# Patient Record
Sex: FEMALE | Race: WHITE | NOT HISPANIC OR LATINO | Employment: FULL TIME | URBAN - METROPOLITAN AREA
[De-identification: names, ages, dates, MRNs, and addresses within clinical notes are randomized per-mention and may not be internally consistent; named-entity substitution may affect disease eponyms.]

---

## 2017-07-06 ENCOUNTER — RECORDS - HEALTHEAST (OUTPATIENT)
Dept: LAB | Facility: CLINIC | Age: 37
End: 2017-07-06

## 2017-07-07 LAB
HAV IGM SERPL QL IA: NEGATIVE
HBV CORE IGM SERPL QL IA: NEGATIVE
HBV SURFACE AG SERPL QL IA: NEGATIVE
HCV AB SERPL QL IA: NEGATIVE

## 2017-07-10 ENCOUNTER — RECORDS - HEALTHEAST (OUTPATIENT)
Dept: ADMINISTRATIVE | Facility: OTHER | Age: 37
End: 2017-07-10

## 2017-07-11 ENCOUNTER — HOSPITAL ENCOUNTER (OUTPATIENT)
Dept: NUCLEAR MEDICINE | Facility: HOSPITAL | Age: 37
Discharge: HOME | End: 2017-07-11
Attending: FAMILY MEDICINE

## 2017-07-11 DIAGNOSIS — R10.11 RUQ PAIN: ICD-10-CM

## 2017-08-07 ENCOUNTER — RECORDS - HEALTHEAST (OUTPATIENT)
Dept: LAB | Facility: CLINIC | Age: 37
End: 2017-08-07

## 2017-08-08 LAB — HIV 1+2 AB+HIV1 P24 AG SERPL QL IA: NEGATIVE

## 2017-08-09 LAB
HBV SURFACE AG SERPL QL IA: NEGATIVE
SYPHILIS RPR SCREEN - HISTORICAL: NORMAL

## 2018-08-06 ENCOUNTER — RECORDS - HEALTHEAST (OUTPATIENT)
Dept: LAB | Facility: CLINIC | Age: 38
End: 2018-08-06

## 2018-08-06 LAB — TSH SERPL DL<=0.005 MIU/L-ACNC: 1.37 UIU/ML (ref 0.3–5)

## 2018-11-23 ENCOUNTER — RECORDS - HEALTHEAST (OUTPATIENT)
Dept: LAB | Facility: CLINIC | Age: 38
End: 2018-11-23

## 2018-11-25 LAB — BACTERIA SPEC CULT: NORMAL

## 2018-11-26 ENCOUNTER — AMBULATORY - HEALTHEAST (OUTPATIENT)
Dept: MATERNAL FETAL MEDICINE | Facility: HOSPITAL | Age: 38
End: 2018-11-26

## 2018-11-26 DIAGNOSIS — Z34.90 PRENATAL CARE, ANTEPARTUM: ICD-10-CM

## 2018-12-06 ENCOUNTER — COMMUNICATION - HEALTHEAST (OUTPATIENT)
Dept: MATERNAL FETAL MEDICINE | Facility: HOSPITAL | Age: 38
End: 2018-12-06

## 2018-12-11 DIAGNOSIS — O09.899 HX OF PRETERM DELIVERY, CURRENTLY PREGNANT: Primary | ICD-10-CM

## 2018-12-28 ENCOUNTER — AMBULATORY - HEALTHEAST (OUTPATIENT)
Dept: MATERNAL FETAL MEDICINE | Facility: HOSPITAL | Age: 38
End: 2018-12-28

## 2019-01-18 ENCOUNTER — PRE VISIT (OUTPATIENT)
Dept: MATERNAL FETAL MEDICINE | Facility: CLINIC | Age: 39
End: 2019-01-18

## 2019-01-21 ENCOUNTER — PRE VISIT (OUTPATIENT)
Dept: MATERNAL FETAL MEDICINE | Facility: CLINIC | Age: 39
End: 2019-01-21

## 2019-01-21 NOTE — PROGRESS NOTES
Maternal-Fetal Medicine Consultation    Anisa Love  : 1980  MRN: [id number]    REFERRAL:  Anisa Love is a 38 year old sent by Dr. York for history of  delivery, here for preconception counseling.    HPI:  Anisa Love is a 38 year old  presenting with concerns regarding preconception counseling given history of cervical insufficiency and PPROM at 22w6d with di/di twins. Baby B, Esha, had demise at 20 days of life and Baby A, Caprice, had extensive NICU stay. She says her mood has been okay, she is on zoloft. Says she never dealt with her loss, since she has been busy with her baby and work. She would like to get pregnant soon, but is worried about having a repeat  delivery. She would like to know what she can do to help prevent this. Reports autopsy of Baby B showed some sort of clotting disorder, she wants to know if she needs genetic screening before next pregnancy     Past medical history notable for:  -  delivery at 24w0d di/di twin pregnancy- presented with cervical insufficiency (3 cm on admission) and PPROM (22w6d).  loss baby B at 20 weeks of life (2017)  - Depression- zoloft     Prenatal Care:  Primary OB care this pregnancy has been with Dr. York    Obstetrics History:  Obstetric History       T0      L1     SAB0   TAB0   Ectopic0   Multiple1   Live Births2       # Outcome Date GA Lbr Pascual/2nd Weight Sex Delivery Anes PTL Lv   1A   24w0d   F    HANNAH   1B   24w0d   F    ND        Gynecologic History:  - Last Pap: NILM (2015)- due for repeat pap  - Denies any history of abnormal pap smears  - Denies prior cervical surgery or procedures  - Denies any history of frequent UTIs, vaginal infections, or STIs    Past Medical History:  Depression    Past Surgical History:  Denies    Current Medications:  Zoloft    Drug and lactation database from the [employer]  [employer]:  http://toxnet.nlm.nih.gov/cgi-bin/sis/htmlgen?LACT    Allergies:  Patient has no allergy information on record.    Social History:   -   - Occasional tobacco use- 1 pack every 2 months- counseled patient on smoking cessation  - Denies alcohol or illicit drug use in pregnancy     Family History:  - Denies family history of bleeding or clotting disorders. Denies history of preeclampsia.     ROS:  10-point ROS negative except as in HPI     PHYSICAL EXAM:  Gen: NAD, well appearing  Chest: Non-labored breathing    ASSESSMENT:  38 year old y.o.  at here for history of  delivery, here for preconception counseling    -  delivery at 24w0d di/di twin pregnancy- presented with cervical insufficiency (3 cm on admission) and PPROM (22w6d)  - Depression    RECOMMENDATIONS:    # delivery- cervical insufficiency and PPROM   - Placental pathology from previous delivery consistent with triple I  - Difficult to predict if this will occur again, given that this was twin pregnancy since majority of data is based on zaidi pregnancies. Reassured patient there is nothing she could have done to prevent this   - Weekly 17-OHP injections starting at 16 weeks   - Serial cervical length assessments beginning at 16 weeks  - If cervical length ? 25 mm, then consider ultrasound indicated cerclage.   - Genetic counselor referral placed, patient desires carrier screening for her and her partner.     #Depression  - Risk of uncontrolled psychiatric disease outweighs the risk of medications during pregnancy, and therefore medications should be continued if medically indicated.  Given this patient should continue taking zoloft  - Given patient's overall mood and affect recommended patient establish care with psychiatrist       I acted as a scribe for Dr. Lauren Perkins MD  Obstetrics and Gyncology, PGY-2  2019 , 9:38 AM      MFM Attending Attestation    I have seen and evaluated the  patient myself. I spent a total of 15 minutes face-to-face with Anisa Love during today's office visit. Over 50% of this time was spent counseling the patient and/or coordinating care regarding history of  delivery and subsequent  loss.  The patient will pursue genetic counseling for carrier screening. See note for details; I have made the necessary edits/additions.    Alexandria Aguilar MD  Maternal Fetal Medicine

## 2019-01-22 ENCOUNTER — OFFICE VISIT (OUTPATIENT)
Dept: MATERNAL FETAL MEDICINE | Facility: CLINIC | Age: 39
End: 2019-01-22
Attending: OBSTETRICS & GYNECOLOGY
Payer: COMMERCIAL

## 2019-01-22 DIAGNOSIS — O09.899 HX OF PRETERM DELIVERY, CURRENTLY PREGNANT: Primary | ICD-10-CM

## 2019-01-22 DIAGNOSIS — O09.529 ANTEPARTUM MULTIGRAVIDA OF ADVANCED MATERNAL AGE: ICD-10-CM

## 2019-01-22 NOTE — PROGRESS NOTES
Pt presents to [facility] for preconception consult due to hx of 24 week di/di twins del with death of twin b at 20 days of life. Pt states she is overall doing of physically. Emotionally states she is working through. Pt met with Dr. Aguilar and Dr. Perkins. See [facility] for today's consult note discussion and recommendations. Questions answered. Pt would like to come back for a gc consult at a later date. Discharged stable. Augusta Spivey RN

## 2019-02-13 ENCOUNTER — TELEPHONE (OUTPATIENT)
Dept: MATERNAL FETAL MEDICINE | Facility: CLINIC | Age: 39
End: 2019-02-13

## 2019-02-13 ENCOUNTER — AMBULATORY - HEALTHEAST (OUTPATIENT)
Dept: MATERNAL FETAL MEDICINE | Facility: HOSPITAL | Age: 39
End: 2019-02-13

## 2019-02-13 DIAGNOSIS — Z31.69 ENCOUNTER FOR PRECONCEPTION CONSULTATION: ICD-10-CM

## 2019-02-13 NOTE — TELEPHONE ENCOUNTER
"I called Anisa regarding her question about her upcoming preconception genetic counseling consult. When she was scheduled for the consult she wanted to know if she and her  will be able to have \"a gamut of tests\" that day for the indication of suspected clotting/genetic issue in her child who passed away. She also wanted to know whether we have all the records we need for the appointment.     I called Anisa to clarify what concerns her daughter's autopsy indicated so we can determine what testing might be appropriate for her and her . I also wanted to let her know that it would be helpful for her to bring a copy of the autopsy report and any testing that was done on her daughter.     I left Anisa a brief message stating that I wanted to talk to her about her questions and to call me back at my direct number.     Elvia Barclay MS, [facility]  Maternal Fetal Medicine  [facility]  Ph: [phone]    "

## 2019-02-13 NOTE — TELEPHONE ENCOUNTER
SELMA received referral from [facility].  Patient is scheduled for genetic counseling appointment on 3/13/19 at [facility].    Marci Lujan

## 2019-02-15 ENCOUNTER — TELEPHONE (OUTPATIENT)
Dept: MATERNAL FETAL MEDICINE | Facility: CLINIC | Age: 39
End: 2019-02-15

## 2019-02-15 NOTE — TELEPHONE ENCOUNTER
Anisa called me back regarding the message I left her. I asked her if she had copies of any testing (particularly any genetic testing) that was done on her daughter. She stated that she has a copy of the autopsy report. I asked her to bring that report and any other test results she has with her to her appointment as we do not have direct access to her daughter's chart. I also explained that which tests are appropriate will depend on what type of bleeding/clotting disorder is indicated in the test results and autopsy report. Anisa agreed to bring the records she has to her appointment in March.     Elvia Barclay MS, [facility]  Maternal Fetal Medicine  [facility]  Ph: [phone]

## 2019-03-13 ENCOUNTER — OFFICE VISIT - HEALTHEAST (OUTPATIENT)
Dept: MATERNAL FETAL MEDICINE | Facility: HOSPITAL | Age: 39
End: 2019-03-13

## 2019-03-13 ENCOUNTER — TRANSFERRED RECORDS (OUTPATIENT)
Dept: HEALTH INFORMATION MANAGEMENT | Facility: CLINIC | Age: 39
End: 2019-03-13

## 2019-03-13 ENCOUNTER — AMBULATORY - HEALTHEAST (OUTPATIENT)
Dept: LAB | Facility: HOSPITAL | Age: 39
End: 2019-03-13

## 2019-03-13 DIAGNOSIS — Z31.430 ENCOUNTER OF FEMALE FOR TESTING FOR GENETIC DISEASE CARRIER STATUS FOR PROCREATIVE MANAGEMENT: ICD-10-CM

## 2019-03-13 DIAGNOSIS — O35.2XX0 HEREDITARY DISEASE IN FAMILY POSSIBLY AFFECTING FETUS, AFFECTING MANAGEMENT OF MOTHER IN PREGNANCY, SINGLE OR UNSPECIFIED FETUS: ICD-10-CM

## 2019-03-13 DIAGNOSIS — Z31.69 ENCOUNTER FOR PRECONCEPTION CONSULTATION: ICD-10-CM

## 2019-03-13 DIAGNOSIS — Z84.81 FAMILY HISTORY OF GENETIC DISEASE CARRIER: ICD-10-CM

## 2019-03-15 LAB
AT III ACT/NOR PPP CHRO: 105 % (ref 85–135)
PROT C ACT/NOR PPP CHRO: 119 % (ref 70–170)
PROT S FREE AG ACT/NOR PPP IA: 110 % (ref 55–125)

## 2019-03-18 ENCOUNTER — COMMUNICATION - HEALTHEAST (OUTPATIENT)
Dept: MATERNAL FETAL MEDICINE | Facility: HOSPITAL | Age: 39
End: 2019-03-18

## 2019-03-18 LAB — FACTOR 5 LEIDEN AND FACTOR 2 PROTHROMBIN MUTATION: NORMAL

## 2019-03-21 ENCOUNTER — RECORDS - HEALTHEAST (OUTPATIENT)
Dept: LAB | Facility: CLINIC | Age: 39
End: 2019-03-21

## 2019-03-22 LAB
ANION GAP SERPL CALCULATED.3IONS-SCNC: 8 MMOL/L (ref 5–18)
BUN SERPL-MCNC: 14 MG/DL (ref 8–22)
CALCIUM SERPL-MCNC: 10.7 MG/DL (ref 8.5–10.5)
CHLORIDE BLD-SCNC: 103 MMOL/L (ref 98–107)
CO2 SERPL-SCNC: 30 MMOL/L (ref 22–31)
CREAT SERPL-MCNC: 0.83 MG/DL (ref 0.6–1.1)
GFR SERPL CREATININE-BSD FRML MDRD: >60 ML/MIN/1.73M2
GLUCOSE BLD-MCNC: 94 MG/DL (ref 70–125)
POTASSIUM BLD-SCNC: 4.9 MMOL/L (ref 3.5–5)
SODIUM SERPL-SCNC: 141 MMOL/L (ref 136–145)
TSH SERPL DL<=0.005 MIU/L-ACNC: 2.22 UIU/ML (ref 0.3–5)

## 2019-03-28 ENCOUNTER — COMMUNICATION - HEALTHEAST (OUTPATIENT)
Dept: MATERNAL FETAL MEDICINE | Facility: HOSPITAL | Age: 39
End: 2019-03-28

## 2019-03-29 ENCOUNTER — COMMUNICATION - HEALTHEAST (OUTPATIENT)
Dept: MATERNAL FETAL MEDICINE | Facility: HOSPITAL | Age: 39
End: 2019-03-29

## 2019-04-12 LAB — COUNSYL FORESIGHT CARRIER SCREEN: NORMAL

## 2019-05-20 ENCOUNTER — RECORDS - HEALTHEAST (OUTPATIENT)
Dept: LAB | Facility: CLINIC | Age: 39
End: 2019-05-20

## 2019-05-22 LAB — BACTERIA SPEC CULT: NORMAL

## 2019-09-05 ENCOUNTER — COMMUNICATION - HEALTHEAST (OUTPATIENT)
Dept: OBGYN | Facility: CLINIC | Age: 39
End: 2019-09-05

## 2019-09-13 ENCOUNTER — MEDICAL CORRESPONDENCE (OUTPATIENT)
Dept: HEALTH INFORMATION MANAGEMENT | Facility: CLINIC | Age: 39
End: 2019-09-13

## 2019-09-13 ENCOUNTER — AMBULATORY - HEALTHEAST (OUTPATIENT)
Dept: MATERNAL FETAL MEDICINE | Facility: HOSPITAL | Age: 39
End: 2019-09-13

## 2019-09-13 DIAGNOSIS — O26.90 PREGNANCY, ANTEPARTUM, COMPLICATIONS: ICD-10-CM

## 2019-09-16 ENCOUNTER — AMBULATORY - HEALTHEAST (OUTPATIENT)
Dept: MATERNAL FETAL MEDICINE | Facility: HOSPITAL | Age: 39
End: 2019-09-16

## 2019-09-16 DIAGNOSIS — O10.919 HTN IN PREGNANCY, CHRONIC: ICD-10-CM

## 2019-09-16 DIAGNOSIS — O09.892 HX OF PRETERM DELIVERY, CURRENTLY PREGNANT, SECOND TRIMESTER: ICD-10-CM

## 2019-09-16 DIAGNOSIS — O09.529 ANTEPARTUM MULTIGRAVIDA OF ADVANCED MATERNAL AGE: Primary | ICD-10-CM

## 2019-09-17 ENCOUNTER — TRANSFERRED RECORDS (OUTPATIENT)
Dept: HEALTH INFORMATION MANAGEMENT | Facility: CLINIC | Age: 39
End: 2019-09-17

## 2019-09-19 ENCOUNTER — PRE VISIT (OUTPATIENT)
Dept: MATERNAL FETAL MEDICINE | Facility: CLINIC | Age: 39
End: 2019-09-19

## 2019-09-19 DIAGNOSIS — I10 HYPERTENSION, UNSPECIFIED TYPE: ICD-10-CM

## 2019-09-19 DIAGNOSIS — O09.899 HX OF PRETERM DELIVERY, CURRENTLY PREGNANT: ICD-10-CM

## 2019-09-19 NOTE — PROGRESS NOTES
Maternal-Fetal Medicine Consultation    Ansia Love  : 1980  MRN: [id number]    REFERRAL:  Anisa Love is a 39 year old sent by Dr. York from [facility] for MFM consultation.    HPI:  Anisa Love is a 39 year old  at 23w4d by 7w4d US here for MFM consultation regarding history of  delivery and anatomy scan with suboptimal cardiac views. She is here with her .    Overall reports that she is doing well this pregnancy. Has been getting weekly Cris injections and serial cervical length monitoring. Recently started on labetalol 100mg BID for elevated blood pressure. She is not checking her blood pressure at home. Reports intermittent headache, nothing severe. No vision changes. No chest pain, SOB, upper abdominal pain.     No LOF, VB. Feeling good movement. Has been feeling some pelvic tightening and pressure. Was evaluated for this a few weeks ago, no concerning findings.    Pregnancy complicated by:  -History of  delivery at 23 weeks with twins  -Chronic hypertension, on labetalol 100mg BID  -Depression on Zoloft  -Obesity    Prenatal Care:  Primary OB care this pregnancy has been with Dr. York  from [facility].    Dating:    LMP: 2019  Dating ultrasound: 2019 at 7w4d  Assigned YUNIOR: 2020 by 7w4d    Obstetrics History:  G1: di/di twins.  delivery at 24 weeks. Baby A living. Baby B  at 20 days.    Gynecologic History:  -Patient's last menstrual period was 2019.   - Last Pap: NILM, HPV: neg (2019)  - History of abnormal pap smears: ASCUS  - Denies prior cervical surgery or procedures  - Denies any history of frequent UTIs, vaginal infections  - History of chamydia    Past Medical History:  Depression  Chronic hypertension    Past Surgical History:  Not reviewed    Current Medications:  -PNV  -Zoloft   -ASA     Allergies:  Patient has no allergy information on record.    Social History:   History of tobacco use (3  cigarettes per week prior to pregnancy)    Family History:  Depression in father and maternal grandmother    ROS:  10-point ROS negative except as in HPI     PHYSICAL EXAM:  LMP 2019    Gen: NAD, well appearing  Chest: Non-labored breathing  Abdomen: gravid    Prenatal Labs:    Date: 2019  ABO/Rh: B+  ABS: neg   Hgb: 14.1  Plt: 250  GC/Chlam NR   Rubella IgG 3.08, Varicella immune  HBsAg NR, HIV NR, TPA NR    2019  AST: 12  Cr: 0.61  UPC: unable to calculate     Ultrasounds:   Dating ultrasound: 2019 at 7w4d  US today   EFW 631g 48.5%ile  Cervical length (TV) 4.03cm.   Adequate views of heart obtained today, normal    ASSESSMENT:  Anisa Love is a 39 year old  at 23w4d by 7w4d US here for M consultation regarding:     History of  delivery with twin pregnancy We reviewed the recommendations for prior  birth with patient.    Recommendations:  -Continue 17-hydroxyprogesterone caproate 250mg IM weekly until 36 weeks  gestation.  -Monitor for symptoms of  labor  -Administration of  corticosteroids if the patient is deemed to be at increased risk of imminent  delivery.     Chronic hypertension Women with chronic hypertension are at increased risk for early-onset preeclampsia.  Low dose aspirin has been used to lower this risk.  Chronic hypertension also increases the risk of maternal stroke, pulmonary edema, renal failure, gestational diabetes, iatrogenic  birth, fetal growth restriction, placental abruption and  mortality rate including stillbirth. Treatment with antihypertensives is generally used to maintain blood pressure in the general range of 120-160 mmHg systolic and  mmHg diastolic. Lower blood pressures may increase the risk of fetal growth restriction. The primary reason for antihypertensive is to reduce the risk of maternal stroke.     Recommendations:  -Continue labetalol and titrate as needed to achieve blood pressure  goal (<160/110)  -Low dose aspirin for preeclampsia prevention  -Ultrasounds for growth every 4 weeks  - fetal surveillance with weekly BPP starting at 32 weeks  -Delivery at 38-39 weeks (unless poorly controlled or otherwise indicated sooner)    The patient was seen and evaluated with Dr. Aguilar    Thank you for allowing us to participate in the care of your patient. Please do not hesitate to contact us if you have further questions regarding the management of your patient.     I acted as a scribe for Dr. Aguilar    Follow up as needed for concerns    Anitha Gonzalez MD  Ob/Gyn PGY-2  2019 3:43 PM    MFM Attending Attestation    I have seen and evaluated the patient myself. I spent a total of 10 minutes face-to-face with Anisa Love during today's office visit. Over 50% of this time was spent counseling the patient and/or coordinating care regarding maternal chronic hypertension and history of  birth. See note for details; I have made the necessary edits/additions.    Alexandria Aguilar MD  Maternal Fetal Medicine

## 2019-09-24 ENCOUNTER — OFFICE VISIT (OUTPATIENT)
Dept: MATERNAL FETAL MEDICINE | Facility: CLINIC | Age: 39
End: 2019-09-24
Attending: OBSTETRICS & GYNECOLOGY
Payer: COMMERCIAL

## 2019-09-24 ENCOUNTER — HOSPITAL ENCOUNTER (OUTPATIENT)
Dept: ULTRASOUND IMAGING | Facility: CLINIC | Age: 39
Discharge: HOME | End: 2019-09-24
Admitting: OBSTETRICS & GYNECOLOGY
Payer: COMMERCIAL

## 2019-09-24 DIAGNOSIS — O10.919 HTN IN PREGNANCY, CHRONIC: ICD-10-CM

## 2019-09-24 DIAGNOSIS — O09.892 HX OF PRETERM DELIVERY, CURRENTLY PREGNANT, SECOND TRIMESTER: ICD-10-CM

## 2019-09-24 DIAGNOSIS — O09.529 ANTEPARTUM MULTIGRAVIDA OF ADVANCED MATERNAL AGE: ICD-10-CM

## 2019-09-24 DIAGNOSIS — O26.90 PREGNANCY RELATED CONDITION, ANTEPARTUM: ICD-10-CM

## 2019-09-24 DIAGNOSIS — O09.892 HISTORY OF PRETERM DELIVERY, CURRENTLY PREGNANT IN SECOND TRIMESTER: Primary | ICD-10-CM

## 2019-09-24 PROCEDURE — 76811 OB US DETAILED SNGL FETUS: CPT

## 2019-09-24 PROCEDURE — 76817 TRANSVAGINAL US OBSTETRIC: CPT | Performed by: OBSTETRICS & GYNECOLOGY

## 2019-10-21 ENCOUNTER — HOSPITAL ENCOUNTER (OUTPATIENT)
Dept: OBGYN | Facility: HOSPITAL | Age: 39
Discharge: HOME | End: 2019-10-21
Attending: OBSTETRICS & GYNECOLOGY | Admitting: OBSTETRICS & GYNECOLOGY

## 2019-10-21 LAB
ABO/RH(D): NORMAL
ALBUMIN UR-MCNC: NEGATIVE MG/DL
ALT SERPL W P-5'-P-CCNC: 11 U/L (ref 0–45)
ANTIBODY SCREEN: NEGATIVE
APPEARANCE UR: CLEAR
APTT PPP: 25 SECONDS (ref 24–37)
AST SERPL W P-5'-P-CCNC: 14 U/L (ref 0–40)
BACTERIA #/AREA URNS HPF: ABNORMAL HPF
BILIRUB UR QL STRIP: NEGATIVE
COLOR UR AUTO: YELLOW
CREAT SERPL-MCNC: 0.6 MG/DL (ref 0.6–1.1)
CREAT UR-MCNC: 91.9 MG/DL
ERYTHROCYTE [DISTWIDTH] IN BLOOD BY AUTOMATED COUNT: 12.8 % (ref 11–14.5)
GFR SERPL CREATININE-BSD FRML MDRD: >60 ML/MIN/1.73M2
GLUCOSE UR STRIP-MCNC: NEGATIVE MG/DL
HCT VFR BLD AUTO: 35.4 % (ref 35–47)
HGB BLD-MCNC: 12 G/DL (ref 12–16)
HGB UR QL STRIP: ABNORMAL
INR PPP: 0.99 (ref 0.9–1.1)
KETONES UR STRIP-MCNC: NEGATIVE MG/DL
LEUKOCYTE ESTERASE UR QL STRIP: NEGATIVE
MCH RBC QN AUTO: 29.8 PG (ref 27–34)
MCHC RBC AUTO-ENTMCNC: 33.9 G/DL (ref 32–36)
MCV RBC AUTO: 88 FL (ref 80–100)
MUCOUS THREADS #/AREA URNS LPF: ABNORMAL LPF
NITRATE UR QL: NEGATIVE
PH UR STRIP: 6 [PH] (ref 4.5–8)
PLATELET # BLD AUTO: 200 THOU/UL (ref 140–440)
PMV BLD AUTO: 10.1 FL (ref 8.5–12.5)
PROTEIN, RANDOM URINE - HISTORICAL: 8 MG/DL
PROTEIN/CREAT RATIO, RANDOM UR: 0.09
RBC # BLD AUTO: 4.03 MILL/UL (ref 3.8–5.4)
RBC #/AREA URNS AUTO: ABNORMAL HPF
SP GR UR STRIP: 1.01 (ref 1–1.03)
SQUAMOUS #/AREA URNS AUTO: ABNORMAL LPF
URATE SERPL-MCNC: 3.6 MG/DL (ref 2–7.5)
UROBILINOGEN UR STRIP-ACNC: ABNORMAL
WBC #/AREA URNS AUTO: ABNORMAL HPF
WBC: 11.9 THOU/UL (ref 4–11)

## 2019-10-21 RX ORDER — OMEPRAZOLE 10 MG/1
20 CAPSULE, DELAYED RELEASE ORAL
Status: SHIPPED | COMMUNITY
Start: 2019-10-21 | End: 2021-12-28

## 2019-10-22 ENCOUNTER — RECORDS - HEALTHEAST (OUTPATIENT)
Dept: ADMINISTRATIVE | Facility: OTHER | Age: 39
End: 2019-10-22

## 2019-10-22 ENCOUNTER — AMBULATORY - HEALTHEAST (OUTPATIENT)
Dept: SURGERY | Facility: CLINIC | Age: 39
End: 2019-10-22

## 2019-10-22 DIAGNOSIS — K80.20 GALLSTONES: ICD-10-CM

## 2019-10-29 ENCOUNTER — RECORDS - HEALTHEAST (OUTPATIENT)
Dept: ADMINISTRATIVE | Facility: OTHER | Age: 39
End: 2019-10-29

## 2019-10-30 ENCOUNTER — RECORDS - HEALTHEAST (OUTPATIENT)
Dept: ADMINISTRATIVE | Facility: OTHER | Age: 39
End: 2019-10-30

## 2019-10-30 RX ORDER — SERTRALINE HYDROCHLORIDE 100 MG/1
200 TABLET, FILM COATED ORAL DAILY
Status: SHIPPED | COMMUNITY
Start: 2019-10-30 | End: 2022-08-01

## 2019-10-31 ENCOUNTER — RECORDS - HEALTHEAST (OUTPATIENT)
Dept: ADMINISTRATIVE | Facility: OTHER | Age: 39
End: 2019-10-31

## 2019-12-06 ENCOUNTER — HOSPITAL ENCOUNTER (OUTPATIENT)
Dept: OBGYN | Facility: HOSPITAL | Age: 39
Discharge: HOME | End: 2019-12-06
Attending: OBSTETRICS & GYNECOLOGY | Admitting: OBSTETRICS & GYNECOLOGY

## 2019-12-06 LAB
ABO/RH(D): NORMAL
ALBUMIN UR-MCNC: NEGATIVE MG/DL
ALT SERPL W P-5'-P-CCNC: <9 U/L (ref 0–45)
ANTIBODY SCREEN: NEGATIVE
APPEARANCE UR: CLEAR
APTT PPP: 25 SECONDS (ref 24–37)
AST SERPL W P-5'-P-CCNC: 12 U/L (ref 0–40)
BILIRUB UR QL STRIP: NEGATIVE
COLOR UR AUTO: YELLOW
CREAT SERPL-MCNC: 0.64 MG/DL (ref 0.6–1.1)
ERYTHROCYTE [DISTWIDTH] IN BLOOD BY AUTOMATED COUNT: 13.3 % (ref 11–14.5)
GFR SERPL CREATININE-BSD FRML MDRD: >60 ML/MIN/1.73M2
GLUCOSE UR STRIP-MCNC: NEGATIVE MG/DL
HCT VFR BLD AUTO: 33.3 % (ref 35–47)
HGB BLD-MCNC: 11 G/DL (ref 12–16)
HGB UR QL STRIP: NEGATIVE
INR PPP: 0.99 (ref 0.9–1.1)
KETONES UR STRIP-MCNC: NEGATIVE MG/DL
LEUKOCYTE ESTERASE UR QL STRIP: NEGATIVE
MCH RBC QN AUTO: 29.2 PG (ref 27–34)
MCHC RBC AUTO-ENTMCNC: 33 G/DL (ref 32–36)
MCV RBC AUTO: 88 FL (ref 80–100)
NITRATE UR QL: NEGATIVE
PH UR STRIP: 6 [PH] (ref 4.5–8)
PLATELET # BLD AUTO: 169 THOU/UL (ref 140–440)
PMV BLD AUTO: 11.1 FL (ref 8.5–12.5)
RBC # BLD AUTO: 3.77 MILL/UL (ref 3.8–5.4)
SP GR UR STRIP: 1.01 (ref 1–1.03)
URATE SERPL-MCNC: 4 MG/DL (ref 2–7.5)
UROBILINOGEN UR STRIP-ACNC: NORMAL
WBC: 10.9 THOU/UL (ref 4–11)

## 2019-12-07 LAB
CREAT UR-MCNC: 50.2 MG/DL
PROTEIN, RANDOM URINE - HISTORICAL: <7 MG/DL
PROTEIN/CREAT RATIO, RANDOM UR: NORMAL

## 2019-12-29 ASSESSMENT — MIFFLIN-ST. JEOR: SCORE: 1605.75

## 2019-12-30 ENCOUNTER — SURGERY - HEALTHEAST (OUTPATIENT)
Dept: OBGYN | Facility: HOSPITAL | Age: 39
End: 2019-12-30

## 2019-12-30 ENCOUNTER — ANESTHESIA - HEALTHEAST (OUTPATIENT)
Dept: OBGYN | Facility: HOSPITAL | Age: 39
End: 2019-12-30

## 2020-01-01 ENCOUNTER — HOME CARE/HOSPICE - HEALTHEAST (OUTPATIENT)
Dept: HOME HEALTH SERVICES | Facility: HOME HEALTH | Age: 40
End: 2020-01-01

## 2020-01-03 ENCOUNTER — HOME CARE/HOSPICE - HEALTHEAST (OUTPATIENT)
Dept: HOME HEALTH SERVICES | Facility: HOME HEALTH | Age: 40
End: 2020-01-03

## 2020-05-08 ENCOUNTER — RECORDS - HEALTHEAST (OUTPATIENT)
Dept: LAB | Facility: CLINIC | Age: 40
End: 2020-05-08

## 2020-05-08 LAB
ANION GAP SERPL CALCULATED.3IONS-SCNC: 11 MMOL/L (ref 5–18)
BUN SERPL-MCNC: 18 MG/DL (ref 8–22)
CALCIUM SERPL-MCNC: 9.6 MG/DL (ref 8.5–10.5)
CHLORIDE BLD-SCNC: 107 MMOL/L (ref 98–107)
CO2 SERPL-SCNC: 23 MMOL/L (ref 22–31)
CREAT SERPL-MCNC: 0.81 MG/DL (ref 0.6–1.1)
GFR SERPL CREATININE-BSD FRML MDRD: >60 ML/MIN/1.73M2
GLUCOSE BLD-MCNC: 100 MG/DL (ref 70–125)
POTASSIUM BLD-SCNC: 4.3 MMOL/L (ref 3.5–5)
SODIUM SERPL-SCNC: 141 MMOL/L (ref 136–145)
TSH SERPL DL<=0.005 MIU/L-ACNC: 1.65 UIU/ML (ref 0.3–5)

## 2020-12-29 ENCOUNTER — RECORDS - HEALTHEAST (OUTPATIENT)
Dept: LAB | Facility: CLINIC | Age: 40
End: 2020-12-29

## 2020-12-29 LAB
ALBUMIN SERPL-MCNC: 4.1 G/DL (ref 3.5–5)
ALP SERPL-CCNC: 48 U/L (ref 45–120)
ALT SERPL W P-5'-P-CCNC: 17 U/L (ref 0–45)
ANION GAP SERPL CALCULATED.3IONS-SCNC: 11 MMOL/L (ref 5–18)
AST SERPL W P-5'-P-CCNC: 17 U/L (ref 0–40)
BILIRUB SERPL-MCNC: 0.5 MG/DL (ref 0–1)
BUN SERPL-MCNC: 12 MG/DL (ref 8–22)
CALCIUM SERPL-MCNC: 8.9 MG/DL (ref 8.5–10.5)
CHLORIDE BLD-SCNC: 104 MMOL/L (ref 98–107)
CO2 SERPL-SCNC: 23 MMOL/L (ref 22–31)
CREAT SERPL-MCNC: 0.81 MG/DL (ref 0.6–1.1)
GFR SERPL CREATININE-BSD FRML MDRD: >60 ML/MIN/1.73M2
GLUCOSE BLD-MCNC: 92 MG/DL (ref 70–125)
POTASSIUM BLD-SCNC: 4.4 MMOL/L (ref 3.5–5)
PROT SERPL-MCNC: 7 G/DL (ref 6–8)
SODIUM SERPL-SCNC: 138 MMOL/L (ref 136–145)

## 2021-02-19 ENCOUNTER — RECORDS - HEALTHEAST (OUTPATIENT)
Dept: LAB | Facility: CLINIC | Age: 41
End: 2021-02-19

## 2021-02-19 LAB
ANION GAP SERPL CALCULATED.3IONS-SCNC: 6 MMOL/L (ref 5–18)
BUN SERPL-MCNC: 14 MG/DL (ref 8–22)
CALCIUM SERPL-MCNC: 8.9 MG/DL (ref 8.5–10.5)
CHLORIDE BLD-SCNC: 106 MMOL/L (ref 98–107)
CO2 SERPL-SCNC: 29 MMOL/L (ref 22–31)
CREAT SERPL-MCNC: 0.78 MG/DL (ref 0.6–1.1)
GFR SERPL CREATININE-BSD FRML MDRD: >60 ML/MIN/1.73M2
GLUCOSE BLD-MCNC: 104 MG/DL (ref 70–125)
POTASSIUM BLD-SCNC: 4.6 MMOL/L (ref 3.5–5)
SODIUM SERPL-SCNC: 141 MMOL/L (ref 136–145)

## 2021-03-01 ENCOUNTER — RECORDS - HEALTHEAST (OUTPATIENT)
Dept: LAB | Facility: CLINIC | Age: 41
End: 2021-03-01

## 2021-03-03 LAB — BACTERIA SPEC CULT: NORMAL

## 2021-05-07 ENCOUNTER — RECORDS - HEALTHEAST (OUTPATIENT)
Dept: LAB | Facility: CLINIC | Age: 41
End: 2021-05-07

## 2021-05-07 LAB — PROLACTIN SERPL-MCNC: 5.5 NG/ML (ref 0–20)

## 2021-05-27 VITALS
DIASTOLIC BLOOD PRESSURE: 88 MMHG | RESPIRATION RATE: 16 BRPM | HEART RATE: 79 BPM | TEMPERATURE: 97.8 F | SYSTOLIC BLOOD PRESSURE: 152 MMHG

## 2021-05-27 NOTE — TELEPHONE ENCOUNTER
3/28/2019    Anisa called and left a voicemail inquiring about insurance coverage for her carrier screening. Genetic counseling returned her call today and left the following information in her voicemail.    After discussion with the testing company and Anisa's insurance provider, it appears that the genetic testing will be a covered benefit.  However, Anisa's specific plan has a 20% coinsurance.   This means that for covered benefits, Anisa will still be responsible for 20% of the cost, unless she reaches an out of pocket maximum for the year.  [facility] is billing her test at $4079, which would result in an $815 out of pocked cost to Anisa based on the coinsurance.  [facility] offers an alternative payment option called their prompt pay program, in which a patient can make a one time payment of ~$350 dollars with no further financial obligation.  This option will be the cheapest option for Anisa's testing.  These cost estimates have been provided to Anisa by [facility] and she is able to elect which payment option she would like to pursue.  Anisa and her 's carrier testing results are still pending and expected in the next several days.  This information was left in Anisa's voicemail as requested and she was encouraged to contact me to discuss any questions.      Avni Ramos MS, [facility]  Licensed Genetic Counselor  Phone: [phone]  Pager: [phone]

## 2021-05-27 NOTE — TELEPHONE ENCOUNTER
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  Being a carrier is not expected to impact her health in any way.  Carriers do not experience any symptoms or medical concerns.  Brad's results are negative for these two conditions, meaning that their children are at low risk for being affected.      Brad was identified as a carrier for 2 conditions: Congenital Adrenal Hyperplasia and Alport syndrome.  Being a carrier is not expected to impact his health in any way.  Carrier do not experience any symptoms or medical concerns.  Anisa's results are negative for these two conditions, meaning that their children are at low risk for being affected.      For each of these conditions, each child they have will be at 50% risk for being affected.  We discussed that typically, carrier testing is not recommended for children, as being a carrier has no impact on medical management, but that this information may be important in the future if their daughter or any other children decide to have children of their own.    We discussed that this testing does not provide any explanation for the health complications their daughter Esha experienced before she passed away.  However, this testing indicates that their daughters, and any future children are at low risk for having any of the 176 conditions assessed.      Anisa's Factor V and Factor II results are also finished, and she is negative for these clotting factors.  Anisa's clotting workup was initiated due to unexplained multisystemic blood clots identified on their daughter Esha's autopsy.  Anisa's clotting workup is complete and unremarkable.      Anisa had no questions at this time and was encouraged to contact me with any questions or concerns moving forward.       Avni Ramos MS, [facility]  Licensed Genetic Counselor  Phone: [phone]  Pager: [phone]

## 2021-06-01 NOTE — PROGRESS NOTES
[facility] received referral from [facility]. Care team at [facility] reviewed referral and determined patient needs to be seen at [facility]. Transferred referral to [facility] and called patient to schedule. Waiting for patient to call back to schedule at [facility].  AMANDA.

## 2021-06-01 NOTE — TELEPHONE ENCOUNTER
"Pt called prison w/ hx of contractions and discharge at 21 weeks gestation, and that her provider had \"messaged\" her to be evaluated at a hospital for pre-term labor.  Pt states she went into pre-term labor at 22 weeks with her previous child and delivered at 23 weeks at [facility].  With her hx and s/s we determined it would be better for her to be seen at a higher acuity facility, the [facility] were offered, but pt requested to be seen at [facility].  TERRENCE Guallpa CNM on call for [facility] was called to inform her that pt is requesting to be seen at [facility], and [facility] was notified that pt would be coming to see them.  Pt was then called back with the info that [facility] could triage her, and that her provider does not see pts there, pt did at this time state she was at work and not planning to leave until after 3pm, I informed her to follow her doctors suggestions and that certainly if the discharge increases or her contractions increase that she should go sooner than later.  Pt agreed to plan as noted above.  "

## 2021-06-02 NOTE — PROGRESS NOTES
[place] [facility] [place]igastric and RUQ pain.  She has had this pain for the last two weeks intermittently, but it has been constant and more intense for the past 12 hours.  She has taken tums and zantac.  She denies HA, visual disturbances or increased swelling.  Her reflexes are +2/+2 with no clonus noted.  She reports nausea and vomiting which she relates to the pain.  She also reports she has had bouts of diarrhea. She denies LOF, dysuria or uterine activity.  EFM applied. Category one tracing noted with no uterine activity.  Dr. Gamboa notified at 0605 of above assessment and orders received for labs and to medicate with a GI cocktail.  Plan of care discussed with patient and .

## 2021-06-02 NOTE — PROGRESS NOTES
Upper abd pain continues. Pt to U/S, gallstone seen. EFM applied per pt's request, no contractions noted. Dr Ayala notified of the above. Pt discharged to home to follow up with [facility] and prenatal visit w/i the week.

## 2021-06-02 NOTE — PROGRESS NOTES
Dr. Gamboa updated with lab results and that patient is feeling a little better.  He reports Dr. Ayala will be coming in to evaluate patient with a probable discharge.

## 2021-06-03 VITALS — BODY MASS INDEX: 26.59 KG/M2 | HEIGHT: 65 IN

## 2021-06-03 VITALS
HEIGHT: 64 IN | BODY MASS INDEX: 32.96 KG/M2 | WEIGHT: 192 LBS | BODY MASS INDEX: 31.95 KG/M2 | BODY MASS INDEX: 32.96 KG/M2 | BODY MASS INDEX: 31.95 KG/M2 | HEIGHT: 64 IN | WEIGHT: 192 LBS

## 2021-06-04 VITALS — BODY MASS INDEX: 33.57 KG/M2 | WEIGHT: 201.5 LBS | HEIGHT: 65 IN

## 2021-06-04 NOTE — ANESTHESIA POSTPROCEDURE EVALUATION
Patient: Anisa Love   SECTION  Anesthesia type: epidural    Patient location: Labor and Delivery  Last vitals:   Vitals Value Taken Time   /63 2019  6:59 AM   Temp 36.6  C (97.9  F) 2019  6:59 AM   Pulse 74 2019  6:59 AM   Resp 16 2019  6:59 AM   SpO2 92 % 2019  6:59 AM     Post vital signs: stable  Level of consciousness: awake and responds to simple questions  Post-anesthesia pain: pain controlled  Post-anesthesia nausea and vomiting: no  Pulmonary: unassisted, return to baseline  Cardiovascular: stable and blood pressure at baseline  Hydration: adequate  Anesthetic events: no    QCDR Measures:  ASA# 11 - Allyson-op Cardiac Arrest: ASA11B - Patient did NOT experience unanticipated cardiac arrest  ASA# 12 - Allyson-op Mortality Rate: ASA12B - Patient did NOT die  ASA# 13 - PACU Re-Intubation Rate: NA - No ETT / LMA used for case  ASA# 10 - Composite Anes Safety: ASA10A - No serious adverse event    Additional Notes:

## 2021-06-04 NOTE — PROGRESS NOTES
"Patient presents to [facility] after \"feeling off\" today.  She is a  and says she has felt nauseous today and noted swelling in her left lower extremity.  Denies headache and any changes in vision today.  Reflexes WNL with no clonus.  BPs and symptoms reviewed with Libra Ferrell CNM on unit.  Orders obtained.  Labs ordered.    "

## 2021-06-04 NOTE — ANESTHESIA PROCEDURE NOTES
Epidural Block    Patient location during procedure: OB    Reason for Block:labor epidural  Staffing:  Performing  Anesthesiologist: Angélica Lucero MD  Preanesthetic Checklist  Completed: patient identified, risks, benefits, and alternatives discussed, timeout performed, consent obtained, airway assessed, oxygen available, suction available, emergency drugs available and hand hygiene performed  Procedure  Patient position: sitting  Prep: ChloraPrep  Patient monitoring: continuous pulse oximetry, heart rate and blood pressure  Approach: midline  Location: L3-L4  Injection technique: ALLEN saline  Number of Attempts:1  Needle  Needle type: Tuohy   Needle gauge: 17 G     Catheter in Space: 5 (ALLEN 6, taped 11)  Assessment  Sensory level:  No complications

## 2021-06-04 NOTE — PROGRESS NOTES
Vitals and lab results reviewed with PARADISE Ferrell who discussed care plan with Dr Gamboa.  Discharge orders obtained and reviewed with patient and .      Discharge paperwork reviewed.  Patient to return to clinic for follow-up on Monday or Tuesday.  Informed to call or return to hospital with any changes or signs of pre-eclampsia.

## 2021-06-04 NOTE — ANESTHESIA PREPROCEDURE EVALUATION
Anesthesia Evaluation      No history of anesthetic complications     Airway    Pulmonary - negative ROS   (-) COPD                         Cardiovascular   (+) hypertension (pre-E, significant IV antihypertensives required, on IV Mag. HELLP panel negative ), ,      Neuro/Psych - negative ROS     Endo/Other    (+) obesity, pregnant     GI/Hepatic/Renal    (+) GERD,             Dental                           Anesthesia Plan  Planned anesthetic: epidural  Labor epidural --> C/S (Buffered 2% Lido w/ epi)  Duramorph 2mg post-delivery  Toradol 15mg at case closure if cleared by surgeon    ASA 3 - emergent     Anesthetic plan and risks discussed with: patient    Post-op plan: routine recovery

## 2021-06-04 NOTE — ANESTHESIA PREPROCEDURE EVALUATION
Anesthesia Evaluation      No history of anesthetic complications     Airway    Pulmonary - negative ROS   (-) COPD                         Cardiovascular   (+) hypertension (pre-E, significant IV antihypertensives required, on IV Mag. HELLP panel negative ), ,      Neuro/Psych - negative ROS     Endo/Other    (+) obesity, pregnant     GI/Hepatic/Renal    (+) GERD,             Dental                         Anesthesia Plan  Planned anesthetic: epidural  Patient requests labor epidural. Chart reviewed, including labs. Reviewed options and risks with the patient, including but not limited to: bleeding, infection, damage to tissues under the skin (nerves, muscles, blood vessels), hypotension, headache, and epidural failure. Questions answered, consent signed. Patient agrees to elective labor epidural.     ASA 3     Anesthetic plan and risks discussed with: patient and spouse    Post-op plan: epidural analgesia

## 2021-06-04 NOTE — ANESTHESIA CARE TRANSFER NOTE
Last vitals:   Vitals:    12/31/19 0051   BP: 90/50   Pulse: 67   Resp: 12   Temp: 36.4  C (97.5  F)   SpO2: 92%     Patient's level of consciousness is awake  Spontaneous respirations: yes  Maintains airway independently: yes  Dentition unchanged: yes  Oropharynx: oropharynx clear of all foreign objects    QCDR Measures:  ASA# 20 - Surgical Safety Checklist: WHO surgical safety checklist completed prior to induction    PQRS# 430 - Adult PONV Prevention: 4558F - Pt received => 2 anti-emetic agents (different classes) preop & intraop  ASA# 8 - Peds PONV Prevention: NA - Not pediatric patient, not GA or 2 or more risk factors NOT present  PQRS# 424 - Allyson-op Temp Management: 4559F - At least one body temp DOCUMENTED => 35.5C or 95.9F within required timeframe  PQRS# 426 - PACU Transfer Protocol: - Transfer of care checklist used  ASA# 14 - Acute Post-op Pain: ASA14B - Patient did NOT experience pain >= 7 out of 10

## 2021-06-22 NOTE — PROGRESS NOTES
MFM [place] [facility]  Patient is scheduled for preconception consultation @ [facility] on 1/22/19.    Marci Lujan

## 2021-06-24 NOTE — PROGRESS NOTES
[facility] [place]  Genetic Counseling Consult    Patient: Anisa Love YOB: 1980   Date of Service: 2019      Anisa Love was seen at [facility] [place] for genetic consultation to review her daughter's autopsy report and discuss appropriate genetic evaluations based on that information.  Anisa was accompanied to her appointment today by her significant other Brad.  Impression/Plan:      1. Anisa and Brad brought a copy of their daughter Esha's medical records and autopsy report to today's appointment.  These records were briefly reviewed with [facility] physician .  Briefly, Esha was born at ~24 weeks gestational age and encountered multiple complications as a result.  Esha's  screen was also irregular for SCID, CF and CAH.  Her autopsy report notes significant blood clots in multiple organ systems that would not be associated with complications of prematurity.      2. Anisa and Brad had blood drawn for expanded carrier screening (Universal Panel + Fragile X through [employer]).  Results are expected within 10-14 days, and will be available in [employer].  We will contact her to discuss the results, and a copy will be forwarded to the office of the referring OB provider.  Test Barcode [id number]    3. After reviewing Anisa's daughter Esha's autopsy records,  recommended a clotting disorder workup for Anisa.  Orders were placed for Protein S antigen, Protein C activity, Antithrombin III activity, and Factor V and Factor II prothrombin mutation testing.  Anisa will be contacted to discuss these results as they are available.       Pregnancy History:   /Parity: No obstetric history on file.   Age at Delivery: 39 y.o.    Anisa gave birth 19 to twin girls at approximately 24 weeks gestational age due to spontaneous rupture of membranes for 1 twin.  Anisa's daughter Esha had multiple  complications from prematurity and passed away on DOL 20.  Anisa's daughter Caprice is alive and well, is being transitioned off of a feeding tube but is otherwise healthy with no known complications.      Medical History:   Anisa s reported medical history is not expected to impact pregnancy management or risks to fetal development.       Family History:     A full family history was not obtained today, and the primary focus of the conversation was their daughter Esha's health history.  Anisa denies any know family history of birth defects, cognitive impairment, or recurrent pregnancy loss.  Anisa reports that she has a maternal half sister with a reported blood clot, attributed to oral contraceptive use, but no other known family history of blood clots, strokes, aneurysm, etc.         Carrier Screening:   The patient reports that she and the father of the pregnancy have  ancestry:     Cystic fibrosis is an autosomal recessive genetic condition that occurs with increased frequency in individuals of  ancestry and carrier screening for this condition is available.  In addition,  screening in the [facility] includes cystic fibrosis.      Expanded carrier screening for mutations in a large panel of genes associated with autosomal recessive conditions including cystic fibrosis, spinal muscular atrophy, and others, is now available.      The patient elected to pursue carrier screening today.  Her blood was drawn for [place] Panel + Fragile X Syndrome and sent to [facility] [employer] [facility].  We will contact her when these test results become available, and a copy of these results will be relayed to her primary OB provider.       Risk Assessment for Chromosome Conditions:   We explained that the risk for fetal chromosome abnormalities increases with maternal age. We discussed specific features of common chromosome abnormalities, including Down syndrome, trisomy 13,  trisomy 18, and sex chromosome trisomies.  We briefly discussed that because these conditions occur as a spontaneous event at conception, there is not testing prior to pregnancy for these conditions, but that screening can be initiated as early as 10 weeks gestational age in a pregnancy if desired.        Discussion:     Anisa provided a summary of her daughter Esha's medical records and autopsy report today, which was reviewed with  as a part of Anisa's appointment today.  Esha was born at 24 weeks gestational age and respiratory, neurological, and gastrointestinal complications as a result.  Esha had an exploratory laparotomy on DoL 13 which identified necrotizing eterocolitis.  Her condition continued to deteriorate over the coming days and eventually passed away on DoL 20.  Esha's autopsy report notes the complications above as well as multiple large blood clots in multiple organ systems that are not explained by prematurity.  Based on this history,  recommended a clotting workup for Anisa as a means of gathering additional information for the family.  Esha's  screen was also reviewed which was positive for SCID and borderline for CF.  Esha's sister Caprice's NBS was positive for CAH.  Caprice was further evaluated by endocrinology and does not have CAH. Due to circumstance there was no follow up on Esha's NBS.  We discussed that it is unclear if the extreme prematurity of the twins could have impacted their NBS, but we discussed expanded carrier screening for both parents as a way of gathering additional information regarding risks for recessive genetic disease.  The couple opted to pursue this testing today and will be contacted once results are available.  The majority of today's appointment was spent clarifying Esha's medical history and discussed that in this situation, it can be difficult to determine which parts of her complications were due to prematurity and if there may have been any  other genetic conditions impacting her health.  We discussed that the testing ordered today would be a way of gathering as much information as possible for the family, both to help them process Esha's experience and to help clarify risks moving forward for the family.  We discussed that identified clotting disorders and risks for genetic conditions can sometimes impact how families approach pregnancy and can dictate medical management for future pregnancies as well.       It was a pleasure to be involved with Anisa dan care. Face-to-face time of the meeting was 50 minutes.    Avni Ramos MS, [facility]  Licensed Genetic Counselor  Phone: [phone]  Pager: [phone]

## 2021-06-25 NOTE — TELEPHONE ENCOUNTER
3/18/2019    Anisa called and reported that she received the billing estimates for her expanded carrier screening from [employer], and per her report, her insurance may cover testing after reviewing an explanation of medical necessity from the ordering clinic.  Anisa asked that I start the process of providing this information to her insurance clinic and gave me permission to contact her insurance provider on her behalf to facilitate this process.  Anisa will be contacted by genetic counseling with updates as the process progresses.     We also discussed results from appointment last week, final results for Protein S antigen, Protein C activity, and Antithrombin III activity are all within the normal range.  Anisa had no questions regarding these results at this time.  Still pending are Factor V and Factor II mutation testing and expanded carrier screening.     Avni Ramos MS, [facility]  Licensed Genetic Counselor  Phone: [phone]  Pager: [phone]

## 2021-06-25 NOTE — TELEPHONE ENCOUNTER
Anisa called and left a VM stating that her insurance requires additional documentation in order to cover her carrier screening and asked for our assistance.  I returned Anisa's call and left a VM asking for her to contact genetic counseling to discuss what documentation is needed.     Avni Ramos MS, [facility]  Licensed Genetic Counselor  Phone: [phone]  Pager: [phone]

## 2021-08-15 ENCOUNTER — HEALTH MAINTENANCE LETTER (OUTPATIENT)
Age: 41
End: 2021-08-15

## 2021-10-10 ENCOUNTER — HEALTH MAINTENANCE LETTER (OUTPATIENT)
Age: 41
End: 2021-10-10

## 2021-12-23 ENCOUNTER — VIRTUAL VISIT (OUTPATIENT)
Dept: PHARMACY | Facility: PHYSICIAN GROUP | Age: 41
End: 2021-12-23
Payer: COMMERCIAL

## 2021-12-23 DIAGNOSIS — L29.9 ITCHING: ICD-10-CM

## 2021-12-23 DIAGNOSIS — F41.1 GENERALIZED ANXIETY DISORDER: Primary | ICD-10-CM

## 2021-12-23 DIAGNOSIS — F32.9 MAJOR DEPRESSIVE DISORDER, REMISSION STATUS UNSPECIFIED, UNSPECIFIED WHETHER RECURRENT: ICD-10-CM

## 2021-12-23 DIAGNOSIS — I10 BENIGN ESSENTIAL HYPERTENSION: ICD-10-CM

## 2021-12-23 DIAGNOSIS — G47.00 INSOMNIA, UNSPECIFIED TYPE: ICD-10-CM

## 2021-12-23 PROCEDURE — 99605 MTMS BY PHARM NP 15 MIN: CPT | Performed by: PHARMACIST

## 2021-12-23 PROCEDURE — 99607 MTMS BY PHARM ADDL 15 MIN: CPT | Performed by: PHARMACIST

## 2021-12-23 NOTE — PROGRESS NOTES
Medication Therapy Management (MTM) Encounter    ASSESSMENT:                            Medication Adherence/Access: No issues identified    Depression, Anxiety, Insomnia: Given persistent symptoms despite maximum doses and combination antidepressant therapy, she would be a good candidate for pharmacogenetic testing to help guide medication adjustments. Additive serotonergic effects may occur with coadministration of duloxetine and sertraline, this could potentially contribute to increase jaw clenching she is experiencing. It may be reasonable to try stopping duloxetine before results are back to start taper process since patient is on lowest dose and no further taper would be needed for this medication.    Education Provided:  - Potential impact of pharmacokinetic and pharmacodynamic genetic variation on medication metabolism and action  - Reviewed genes tested  - Reviewed what report will look like  - How information may be helpful in guiding treatment  - How results may be useful when reviewing safety of other medications  - Limitations of test results on individual medication experience and other factors that impact medication selection    Patient Consent Form reviewed with patient, patient provided opportunity to ask questions, and confirmed understanding.    Hypertension: Patient's last clinic blood pressure was a little high but previous clinic readings were meeting blood pressure goal of < 130/80mmHg. Reasonable to continue current medications, focus on anxiety management and continue to monitor. She would benefit from complete smoking cessation as this may help blood pressure and can actually increase anxiety as well. Plan to discuss further at follow-up.     Skin rash: Improved with cetirizine.    PLAN:                            1. You should receive your test kit in the mail from [employer] in the next week. When it arrives follow the instructions to do the cheek swabs and send your sample back in the  packaging provided.   2. You can also find more information about the test at [email or web address]  3. Continue to take all of your current medications until we have your results and come up with a plan with your doctor. If you want to try stopping low dose duloxetine this would be okay since you are at the lowest dose and do not need to wean down.  4. Once your results are back I will call you to let you know, send the report by secure email, and setup a time for us to talk through the results.     Follow-up: By phone in 2-3 weeks when [employer] results are back      SUBJECTIVE/OBJECTIVE:                          Anisa Love is a 41 year old female called for an initial visit. She was referred to me from Tiffani Cassidy MD.      Reason for visit: Anxiety and depression medication management, discuss pharmacogenetic testing.    Allergies/ADRs: None  Past Medical History: Reviewed in chart  Tobacco: She reports that she has been smoking. She has never used smokeless tobacco. She reports smoking about 1/2 pack every 3 days or so.   Alcohol: 1-3 beverages / week  Caffeine: diet coke    Medication Adherence/Access: no issues reported    Depression, Anxiety, Insomnia:   Current medication:   sertraline 200 mg once daily  duloxetine 20 mg once daily  hydroxyzine 10 mg as needed- hasn't started  trazodone 100 mg at bedtime- hasn't started  She has struggled with depression and anxiety for a long time. Her antidepressant therapy was adjusted when she was trying to become pregnant. Prior to this her depression and anxiety were well managed with Cymbalta but she was transitioned to sertraline as a safer alternative during pregnancy. She did have a hard time coming off Cymbalta and is worried about being on a medication with significant withdrawal symptoms again. She has been on sertraline for some time now. She does think it was helpful initially but she has been on the highest dose and is still having a lot of symptoms.  Low duloxetine was added to see if that further helped her anxiety symptoms after she delivered. 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 She has had more tension in her jaw and also gets skin reaction she related to anxiety. At recent visit with Dr. Cassidy hydroxyzine and trazodone were ordered to help with her sleep and also her itching related to anxiety but she has not started these yet. They also discuss pharmacogenetic testing as an option before making any other medication changes and she is interested in pursuing this. She has had other antidepressant trials in the past, can't remember names of all of them. She would like to have more information to help with selecting medication and doses if available.     Hypertension:   Current medications:   Losartan 50 mg once daily  Amlodipine 10 mg (2x 5 mg tabs) once daily    She had issues with high blood pressure when she was pregnant and continues on blood pressure medication. She does not self-monitor blood pressure lately.  She reports no current medication side effects.        Skin rash:   Current medication: cetirizine 10 mg once daily  She takes this to help with rash and hives she has been getting from her increased anxiety. Itching and symptoms do improve when she takes cetirizine and return when she stops taking it.     Last Clinic Vitals: 12/15/2021- /84 (BP Location: Right arm, Patient Position: Sitting, Cuff Size: Adult Regular)   Pulse 68   ----------------    I spent 25 minutes with this patient today. All changes were made via collaborative practice agreement with Tiffani Cassidy MD. A copy of the visit note was provided to the patient's primary care provider.    The patient was sent via [employer] a summary of these recommendations.     Madeline Hodgson, PharmD, BCACP  Medication Therapy Management Pharmacist  [facility]  Pager: [phone]      Telemedicine Visit Details  Type of service:   Telephone visit  Start Time: 1:14 PM  End Time: 1:39 PM  Originating Location (patient location): Home  Distant Location (provider location):  [facility]     Medication Therapy Recommendations  Generalized anxiety disorder    Current Medication: sertraline (ZOLOFT) 100 MG tablet   Rationale: Condition refractory to medication - Ineffective medication - Effectiveness   Recommendation: Order Lab   Status: Accepted per CPA   Note: Pharmacogenetic testing

## 2021-12-28 VITALS — SYSTOLIC BLOOD PRESSURE: 134 MMHG | HEART RATE: 68 BPM | DIASTOLIC BLOOD PRESSURE: 84 MMHG

## 2021-12-28 RX ORDER — ACETAMINOPHEN AND CODEINE PHOSPHATE 120; 12 MG/5ML; MG/5ML
0.35 SOLUTION ORAL DAILY
COMMUNITY
End: 2022-08-01

## 2021-12-28 RX ORDER — HYDROXYZINE HYDROCHLORIDE 10 MG/1
10 TABLET, FILM COATED ORAL 3 TIMES DAILY PRN
COMMUNITY
End: 2022-08-01

## 2021-12-28 RX ORDER — LOSARTAN POTASSIUM 50 MG/1
50 TABLET ORAL DAILY
COMMUNITY

## 2021-12-28 RX ORDER — AMLODIPINE BESYLATE 5 MG/1
10 TABLET ORAL DAILY
COMMUNITY

## 2021-12-28 RX ORDER — TRAZODONE HYDROCHLORIDE 100 MG/1
50-100 TABLET ORAL AT BEDTIME
COMMUNITY
End: 2023-12-07

## 2021-12-28 RX ORDER — ACYCLOVIR 400 MG/1
400 TABLET ORAL 3 TIMES DAILY PRN
COMMUNITY

## 2021-12-28 RX ORDER — DULOXETIN HYDROCHLORIDE 20 MG/1
20 CAPSULE, DELAYED RELEASE ORAL DAILY
COMMUNITY
End: 2022-08-01

## 2021-12-28 RX ORDER — CETIRIZINE HYDROCHLORIDE 10 MG/1
10 TABLET ORAL DAILY
COMMUNITY
End: 2024-03-05

## 2021-12-28 NOTE — PATIENT INSTRUCTIONS
Recommendations from today's MTM visit:                                                    MTM (medication therapy management) is a service provided by a clinical pharmacist designed to help you get the most of out of your medicines.   Today we reviewed what your medicines are for, how to know if they are working, that your medicines are safe and how to make your medicine regimen as easy as possible.      1. You should receive your test kit in the mail from [employer] in the next week. When it arrives follow the instructions to do the cheek swabs and send your sample back in the packaging provided.   2. You can also find more information about the test at [email or web address]  3. Continue to take all of your current medications until we have your results and come up with a plan with your doctor. If you want to try stopping low dose duloxetine this would be okay since you are at the lowest dose and do not need to wean down.  4. Once your results are back I will call you to let you know, send the report by secure email, and setup a time for us to talk through the results.     Follow-up: By phone in 2-3 weeks when [employer] results are back    It was great to speak with you today.  I value your experience and would be very thankful for your time with providing feedback on our clinic survey. You may receive a survey via email or text message in the next few days.     To schedule another MTM appointment, please call the MTM scheduling line at [phone].    My Clinical Pharmacist's contact information:                                                      Please feel free to contact me with any questions or concerns you have.      Madeline Hodgson, [place], BCACP  Medication Therapy Management Pharmacist  [facility]  Pager: [phone]

## 2022-02-04 ENCOUNTER — OFFICE VISIT (OUTPATIENT)
Dept: PHARMACY | Facility: PHYSICIAN GROUP | Age: 42
End: 2022-02-04
Payer: COMMERCIAL

## 2022-02-04 DIAGNOSIS — Z13.79 ENCOUNTER FOR PHARMACOGENETIC TESTING: Primary | ICD-10-CM

## 2022-02-04 PROCEDURE — 99207 PR NO CHARGE LOS: CPT | Performed by: PHARMACIST

## 2022-02-04 NOTE — PROGRESS NOTES
Clinical Pharmacy Consult:                                                    This patient recently had pharmacogenetics testing completed through [employer]. Unfortunately, patient has been unable to reach to schedule a follow-up to review results, therefore a chart review has been performed to assess the patient's medications for safety and efficacy. This chart review is being routed to the patient's primary care provider in order for the PCP to meet with the patient to discuss the results and make any necessary medication adjustments.     Pharmacogenetic Results:  [facility] testing conducted and reported on 2022.  Select PGX Results: (see PDF report in chart documents for more detailed results):    Pharmacokinetic Gene Variants     Gene Phenotype Notable Medications Impacted   CY Rapid Metabolizer duloxetine   [id number] Intermediate Metabolizer sertraline, citalopram       Pharmacodynamic Gene Variants     Gene Phenotype Notable Medications Impacted   HLA-A*3101 Higher Risk carbamazepine, oxcarbazepine, lamotrigine   HLA-B*1502 Higher Risk carbamazepine, oxcarbazepine, lamotrigine   [id number] (*1A/*21) Decreased Function   May cause higher levels of certain statins.  Atorvastatin, pravastatin         Assessment:  [id number]  Intermediate Metabolizer    Medications converted to inactive metabolite(s) may have increased serum levels and increased risk for side effects. Lower or less frequent doses or alternative medication may be needed to reduce risk of side effects. (medications: sertraline, citalopram)    Medications converted to active metabolite(s) may have reduced exposure.     CY Rapid Metabolizer    Active drugs converted to inactive metabolite(s) may have decreased exposure and may result in lower serum levels than expected. Lower plasma concentrations will increase probability of pharmacotherapy failure. May require higher or more frequent doses to achieve therapeutic levels. (notable medications:  duloxetine)    Based on these results she may benefit from transitioning off sertraline and duloxetine and considering venlafaxine or fluoxetine.     Recommendations for consideration by PCP:    1. Consider stopping duloxetine and sertraline and starting venlafaxine or fluoxetine    Madeline Hodgson, PharmD, BCACP  Medication Therapy Management Pharmacist  [facility]  Pager: [phone]

## 2022-03-14 ENCOUNTER — LAB REQUISITION (OUTPATIENT)
Dept: LAB | Facility: CLINIC | Age: 42
End: 2022-03-14

## 2022-03-14 DIAGNOSIS — N92.6 IRREGULAR MENSTRUATION, UNSPECIFIED: ICD-10-CM

## 2022-03-14 DIAGNOSIS — N89.8 OTHER SPECIFIED NONINFLAMMATORY DISORDERS OF VAGINA: ICD-10-CM

## 2022-03-14 LAB — HCG SERPL QL: NEGATIVE

## 2022-03-14 PROCEDURE — 84703 CHORIONIC GONADOTROPIN ASSAY: CPT | Performed by: FAMILY MEDICINE

## 2022-03-14 PROCEDURE — 87205 SMEAR GRAM STAIN: CPT | Performed by: FAMILY MEDICINE

## 2022-03-15 LAB
NUGENT SCORE: 0
WHITE BLOOD CELLS: NORMAL

## 2022-04-20 ENCOUNTER — LAB REQUISITION (OUTPATIENT)
Dept: LAB | Facility: CLINIC | Age: 42
End: 2022-04-20

## 2022-04-20 DIAGNOSIS — Z01.419 ENCOUNTER FOR GYNECOLOGICAL EXAMINATION (GENERAL) (ROUTINE) WITHOUT ABNORMAL FINDINGS: ICD-10-CM

## 2022-04-20 DIAGNOSIS — I10 ESSENTIAL (PRIMARY) HYPERTENSION: ICD-10-CM

## 2022-04-20 LAB
ANION GAP SERPL CALCULATED.3IONS-SCNC: 11 MMOL/L (ref 5–18)
BUN SERPL-MCNC: 12 MG/DL (ref 8–22)
CALCIUM SERPL-MCNC: 9.7 MG/DL (ref 8.5–10.5)
CHLORIDE BLD-SCNC: 103 MMOL/L (ref 98–107)
CO2 SERPL-SCNC: 23 MMOL/L (ref 22–31)
CREAT SERPL-MCNC: 0.78 MG/DL (ref 0.6–1.1)
GFR SERPL CREATININE-BSD FRML MDRD: >90 ML/MIN/1.73M2
GLUCOSE BLD-MCNC: 84 MG/DL (ref 70–125)
POTASSIUM BLD-SCNC: 4.1 MMOL/L (ref 3.5–5)
SODIUM SERPL-SCNC: 137 MMOL/L (ref 136–145)

## 2022-04-20 PROCEDURE — 80048 BASIC METABOLIC PNL TOTAL CA: CPT | Performed by: FAMILY MEDICINE

## 2022-04-20 PROCEDURE — G0123 SCREEN CERV/VAG THIN LAYER: HCPCS | Performed by: FAMILY MEDICINE

## 2022-04-20 PROCEDURE — 87624 HPV HI-RISK TYP POOLED RSLT: CPT | Performed by: FAMILY MEDICINE

## 2022-04-25 LAB
HUMAN PAPILLOMA VIRUS 16 DNA: NEGATIVE
HUMAN PAPILLOMA VIRUS 18 DNA: NEGATIVE
HUMAN PAPILLOMA VIRUS FINAL DIAGNOSIS: NORMAL
HUMAN PAPILLOMA VIRUS OTHER HR: NEGATIVE

## 2022-04-26 LAB
BKR LAB AP GYN ADEQUACY: NORMAL
BKR LAB AP GYN INTERPRETATION: NORMAL
BKR LAB AP HPV REFLEX: NORMAL
BKR LAB AP LMP: NORMAL
BKR LAB AP PREVIOUS ABNORMAL: NORMAL
PATH REPORT.COMMENTS IMP SPEC: NORMAL
PATH REPORT.COMMENTS IMP SPEC: NORMAL
PATH REPORT.RELEVANT HX SPEC: NORMAL

## 2022-08-01 RX ORDER — FLUOXETINE 40 MG/1
40 CAPSULE ORAL DAILY
COMMUNITY
End: 2023-12-07

## 2022-08-01 RX ORDER — ARIPIPRAZOLE 5 MG/1
5 TABLET ORAL DAILY
COMMUNITY
End: 2023-12-07

## 2022-08-02 ENCOUNTER — ANESTHESIA EVENT (OUTPATIENT)
Dept: SURGERY | Facility: AMBULATORY SURGERY CENTER | Age: 42
End: 2022-08-02
Payer: COMMERCIAL

## 2022-08-03 ENCOUNTER — ANESTHESIA (OUTPATIENT)
Dept: SURGERY | Facility: AMBULATORY SURGERY CENTER | Age: 42
End: 2022-08-03
Payer: COMMERCIAL

## 2022-08-03 ENCOUNTER — HOSPITAL ENCOUNTER (OUTPATIENT)
Facility: AMBULATORY SURGERY CENTER | Age: 42
Discharge: HOME | End: 2022-08-03
Attending: OBSTETRICS & GYNECOLOGY
Payer: COMMERCIAL

## 2022-08-03 VITALS
SYSTOLIC BLOOD PRESSURE: 111 MMHG | WEIGHT: 180 LBS | DIASTOLIC BLOOD PRESSURE: 63 MMHG | HEIGHT: 64 IN | OXYGEN SATURATION: 95 % | HEART RATE: 61 BPM | RESPIRATION RATE: 16 BRPM | TEMPERATURE: 97.1 F | BODY MASS INDEX: 30.73 KG/M2

## 2022-08-03 DIAGNOSIS — R10.2 PELVIC PAIN: ICD-10-CM

## 2022-08-03 LAB
HCG UR QL: NEGATIVE
INTERNAL QC OK POCT: NORMAL
POCT KIT EXPIRATION DATE: NORMAL
POCT KIT LOT NUMBER: NORMAL

## 2022-08-03 RX ORDER — DEXAMETHASONE SODIUM PHOSPHATE 4 MG/ML
INJECTION, SOLUTION INTRA-ARTICULAR; INTRALESIONAL; INTRAMUSCULAR; INTRAVENOUS; SOFT TISSUE PRN
Status: DISCONTINUED | OUTPATIENT
Start: 2022-08-03 | End: 2022-08-03

## 2022-08-03 RX ORDER — SODIUM CHLORIDE, SODIUM LACTATE, POTASSIUM CHLORIDE, CALCIUM CHLORIDE 600; 310; 30; 20 MG/100ML; MG/100ML; MG/100ML; MG/100ML
INJECTION, SOLUTION INTRAVENOUS CONTINUOUS
Status: DISCONTINUED | OUTPATIENT
Start: 2022-08-03 | End: 2022-08-04 | Stop reason: HOSPADM

## 2022-08-03 RX ORDER — CEFAZOLIN SODIUM 2 G/100ML
2 INJECTION, SOLUTION INTRAVENOUS SEE ADMIN INSTRUCTIONS
Status: DISCONTINUED | OUTPATIENT
Start: 2022-08-03 | End: 2022-08-04 | Stop reason: HOSPADM

## 2022-08-03 RX ORDER — CEFAZOLIN SODIUM 2 G/100ML
2 INJECTION, SOLUTION INTRAVENOUS
Status: COMPLETED | OUTPATIENT
Start: 2022-08-03 | End: 2022-08-03

## 2022-08-03 RX ORDER — PROPOFOL 10 MG/ML
INJECTION, EMULSION INTRAVENOUS CONTINUOUS PRN
Status: DISCONTINUED | OUTPATIENT
Start: 2022-08-03 | End: 2022-08-03

## 2022-08-03 RX ORDER — FENTANYL CITRATE 0.05 MG/ML
25 INJECTION, SOLUTION INTRAMUSCULAR; INTRAVENOUS EVERY 5 MIN PRN
Status: DISCONTINUED | OUTPATIENT
Start: 2022-08-03 | End: 2022-08-04 | Stop reason: HOSPADM

## 2022-08-03 RX ORDER — HYDROCODONE BITARTRATE AND ACETAMINOPHEN 5; 325 MG/1; MG/1
1 TABLET ORAL EVERY 6 HOURS PRN
Qty: 10 TABLET | Refills: 0 | Status: SHIPPED | OUTPATIENT
Start: 2022-08-03 | End: 2022-08-06

## 2022-08-03 RX ORDER — ACETAMINOPHEN 325 MG/1
975 TABLET ORAL ONCE
Status: DISCONTINUED | OUTPATIENT
Start: 2022-08-03 | End: 2022-08-04 | Stop reason: HOSPADM

## 2022-08-03 RX ORDER — FENTANYL CITRATE 0.05 MG/ML
25 INJECTION, SOLUTION INTRAMUSCULAR; INTRAVENOUS
Status: DISCONTINUED | OUTPATIENT
Start: 2022-08-03 | End: 2022-08-04 | Stop reason: HOSPADM

## 2022-08-03 RX ORDER — GLYCOPYRROLATE 0.2 MG/ML
INJECTION, SOLUTION INTRAMUSCULAR; INTRAVENOUS PRN
Status: DISCONTINUED | OUTPATIENT
Start: 2022-08-03 | End: 2022-08-03

## 2022-08-03 RX ORDER — LIDOCAINE HYDROCHLORIDE 20 MG/ML
INJECTION, SOLUTION INFILTRATION; PERINEURAL PRN
Status: DISCONTINUED | OUTPATIENT
Start: 2022-08-03 | End: 2022-08-03

## 2022-08-03 RX ORDER — FENTANYL CITRATE 50 UG/ML
INJECTION, SOLUTION INTRAMUSCULAR; INTRAVENOUS PRN
Status: DISCONTINUED | OUTPATIENT
Start: 2022-08-03 | End: 2022-08-03

## 2022-08-03 RX ORDER — HYDROMORPHONE HCL IN WATER/PF 6 MG/30 ML
0.2 PATIENT CONTROLLED ANALGESIA SYRINGE INTRAVENOUS EVERY 5 MIN PRN
Status: DISCONTINUED | OUTPATIENT
Start: 2022-08-03 | End: 2022-08-04 | Stop reason: HOSPADM

## 2022-08-03 RX ORDER — HYDROCODONE BITARTRATE AND ACETAMINOPHEN 5; 325 MG/1; MG/1
1 TABLET ORAL EVERY 6 HOURS PRN
Status: DISCONTINUED | OUTPATIENT
Start: 2022-08-03 | End: 2022-08-04 | Stop reason: HOSPADM

## 2022-08-03 RX ORDER — ONDANSETRON 4 MG/1
4 TABLET, ORALLY DISINTEGRATING ORAL EVERY 30 MIN PRN
Status: DISCONTINUED | OUTPATIENT
Start: 2022-08-03 | End: 2022-08-04 | Stop reason: HOSPADM

## 2022-08-03 RX ORDER — ONDANSETRON 2 MG/ML
INJECTION INTRAMUSCULAR; INTRAVENOUS PRN
Status: DISCONTINUED | OUTPATIENT
Start: 2022-08-03 | End: 2022-08-03

## 2022-08-03 RX ORDER — MEPERIDINE HYDROCHLORIDE 25 MG/ML
12.5 INJECTION INTRAMUSCULAR; INTRAVENOUS; SUBCUTANEOUS
Status: DISCONTINUED | OUTPATIENT
Start: 2022-08-03 | End: 2022-08-04 | Stop reason: HOSPADM

## 2022-08-03 RX ORDER — ONDANSETRON 2 MG/ML
4 INJECTION INTRAMUSCULAR; INTRAVENOUS EVERY 30 MIN PRN
Status: DISCONTINUED | OUTPATIENT
Start: 2022-08-03 | End: 2022-08-04 | Stop reason: HOSPADM

## 2022-08-03 RX ORDER — KETOROLAC TROMETHAMINE 30 MG/ML
INJECTION, SOLUTION INTRAMUSCULAR; INTRAVENOUS PRN
Status: DISCONTINUED | OUTPATIENT
Start: 2022-08-03 | End: 2022-08-03

## 2022-08-03 RX ORDER — OXYCODONE HYDROCHLORIDE 5 MG/1
5 TABLET ORAL EVERY 4 HOURS PRN
Status: DISCONTINUED | OUTPATIENT
Start: 2022-08-03 | End: 2022-08-04 | Stop reason: HOSPADM

## 2022-08-03 RX ORDER — IBUPROFEN 800 MG/1
800 TABLET, FILM COATED ORAL ONCE
Status: DISCONTINUED | OUTPATIENT
Start: 2022-08-03 | End: 2022-08-04 | Stop reason: HOSPADM

## 2022-08-03 RX ORDER — BUPIVACAINE HYDROCHLORIDE 2.5 MG/ML
INJECTION, SOLUTION EPIDURAL; INFILTRATION; INTRACAUDAL PRN
Status: DISCONTINUED | OUTPATIENT
Start: 2022-08-03 | End: 2022-08-03 | Stop reason: HOSPADM

## 2022-08-03 RX ORDER — NEOSTIGMINE METHYLSULFATE 1 MG/ML
VIAL (ML) INJECTION PRN
Status: DISCONTINUED | OUTPATIENT
Start: 2022-08-03 | End: 2022-08-03

## 2022-08-03 RX ORDER — LIDOCAINE 40 MG/G
CREAM TOPICAL
Status: DISCONTINUED | OUTPATIENT
Start: 2022-08-03 | End: 2022-08-04 | Stop reason: HOSPADM

## 2022-08-03 RX ADMIN — PROPOFOL 200 MG: 10 INJECTION, EMULSION INTRAVENOUS at 09:43

## 2022-08-03 RX ADMIN — FENTANYL CITRATE 100 MCG: 50 INJECTION, SOLUTION INTRAMUSCULAR; INTRAVENOUS at 09:43

## 2022-08-03 RX ADMIN — KETOROLAC TROMETHAMINE 30 MG: 30 INJECTION, SOLUTION INTRAMUSCULAR; INTRAVENOUS at 10:29

## 2022-08-03 RX ADMIN — LIDOCAINE HYDROCHLORIDE 4 ML: 20 INJECTION, SOLUTION INFILTRATION; PERINEURAL at 09:43

## 2022-08-03 RX ADMIN — DEXAMETHASONE SODIUM PHOSPHATE 8 MG: 4 INJECTION, SOLUTION INTRA-ARTICULAR; INTRALESIONAL; INTRAMUSCULAR; INTRAVENOUS; SOFT TISSUE at 09:43

## 2022-08-03 RX ADMIN — FENTANYL CITRATE 25 MCG: 0.05 INJECTION, SOLUTION INTRAMUSCULAR; INTRAVENOUS at 11:00

## 2022-08-03 RX ADMIN — FENTANYL CITRATE 25 MCG: 0.05 INJECTION, SOLUTION INTRAMUSCULAR; INTRAVENOUS at 11:07

## 2022-08-03 RX ADMIN — SODIUM CHLORIDE, SODIUM LACTATE, POTASSIUM CHLORIDE, CALCIUM CHLORIDE: 600; 310; 30; 20 INJECTION, SOLUTION INTRAVENOUS at 08:36

## 2022-08-03 RX ADMIN — Medication 10 MG: at 10:18

## 2022-08-03 RX ADMIN — GLYCOPYRROLATE 0.8 MG: 0.2 INJECTION, SOLUTION INTRAMUSCULAR; INTRAVENOUS at 10:31

## 2022-08-03 RX ADMIN — FENTANYL CITRATE 25 MCG: 0.05 INJECTION, SOLUTION INTRAMUSCULAR; INTRAVENOUS at 11:15

## 2022-08-03 RX ADMIN — FENTANYL CITRATE 25 MCG: 0.05 INJECTION, SOLUTION INTRAMUSCULAR; INTRAVENOUS at 10:52

## 2022-08-03 RX ADMIN — Medication 4 MG: at 10:31

## 2022-08-03 RX ADMIN — HYDROCODONE BITARTRATE AND ACETAMINOPHEN 1 TABLET: 5; 325 TABLET ORAL at 11:38

## 2022-08-03 RX ADMIN — Medication 50 MG: at 09:43

## 2022-08-03 RX ADMIN — ONDANSETRON 4 MG: 2 INJECTION INTRAMUSCULAR; INTRAVENOUS at 10:27

## 2022-08-03 RX ADMIN — PROPOFOL 50 MCG/KG/MIN: 10 INJECTION, EMULSION INTRAVENOUS at 09:43

## 2022-08-03 RX ADMIN — CEFAZOLIN SODIUM 2 G: 2 INJECTION, SOLUTION INTRAVENOUS at 09:50

## 2022-08-03 ASSESSMENT — ENCOUNTER SYMPTOMS: SEIZURES: 0

## 2022-08-03 NOTE — ANESTHESIA POSTPROCEDURE EVALUATION
Patient: Anisa Love    Procedure: Procedure(s):  DIAGNOSTIC LAPAROSCOPY, excision of endometrial cysts and cauterizaton of endometrial cysts       Anesthesia Type:  General    Note:  Disposition: Outpatient   Postop Pain Control: Uneventful            Sign Out: Well controlled pain   PONV: No   Neuro/Psych: Uneventful            Sign Out: Acceptable/Baseline neuro status   Airway/Respiratory: Uneventful            Sign Out: Acceptable/Baseline resp. status   CV/Hemodynamics: Uneventful            Sign Out: Acceptable CV status; No obvious hypovolemia; No obvious fluid overload   Other NRE: NONE   DID A NON-ROUTINE EVENT OCCUR? No           Last vitals:  Vitals Value Taken Time   /68 08/03/22 1118   Temp 97.1  F (36.2  C) 08/03/22 1045   Pulse 65 08/03/22 1118   Resp 16 08/03/22 1118   SpO2 95 % 08/03/22 1118       Electronically Signed By: Henrry Merino MD  August 3, 2022  11:25 AM

## 2022-08-03 NOTE — ANESTHESIA PROCEDURE NOTES
Airway         Procedure Start/Stop Times: 8/3/2022 9:46 AM  Staff -        Anesthesiologist:  Henrry Merino MD       CRNA: Patty Domínguez APRN CRNA       Performed By: CRNAIndications and Patient Condition       Indications for airway management: patricio-procedural       Induction type:intravenous       Mask difficulty assessment: 1 - vent by mask    Final Airway Details       Final airway type: endotracheal airway       Successful airway: ETT - single  Endotracheal Airway Details        ETT size (mm): 7.0       Cuffed: yes       Successful intubation technique: direct laryngoscopy       DL Blade Type: Ryan 2       Grade View of Cords: 1       Adjucts: stylet and tooth guard       Position: Right       Measured from: lips       Secured at (cm): 22       Bite block used: None    Post intubation assessment        Placement verified by: capnometry, equal breath sounds and chest rise        Number of attempts at approach: 1       Secured with: silk tape       Ease of procedure: easy       Dentition: Intact and Unchanged    Medication(s) Administered   Medication Administration Time: 8/3/2022 9:46 AM

## 2022-08-03 NOTE — ANESTHESIA PREPROCEDURE EVALUATION
Anesthesia Pre-Procedure Evaluation    Patient: Anisa Love   MRN: [id number] : 1980        Procedure : Procedure(s):  DIAGNOSTIC LAPAROSCOPY          Past Medical History:   Diagnosis Date     Depression      GERD (gastroesophageal reflux disease)      Gonorrhea      HPV (human papilloma virus) anogenital infection      Hypertension      Mental disorder      Noninfectious ileitis      PONV (postoperative nausea and vomiting)      Ulcerative colitis (H)       Past Surgical History:   Procedure Laterality Date      SECTION N/A 2019    Procedure:  SECTION;  Surgeon: Magi Ayala MD;  Location: [facility] OR;  Service: Obstetrics     LAPAROSCOPIC CHOLECYSTECTOMY  2019     WISDOM TOOTH EXTRACTION        No Known Allergies   Social History     Tobacco Use     Smoking status: Current Some Day Smoker     Smokeless tobacco: Never Used   Substance Use Topics     Alcohol use: Not on file      Wt Readings from Last 1 Encounters:   19 91.4 kg (201 lb 8 oz)        Anesthesia Evaluation   Pt has had prior anesthetic.     No history of anesthetic complications       ROS/MED HX  ENT/Pulmonary:  - neg pulmonary ROS  (-) sleep apnea   Neurologic:  - neg neurologic ROS  (-) no seizures and no CVA   Cardiovascular:     (+) hypertension----- (-) CAD   METS/Exercise Tolerance:     Hematologic:  - neg hematologic  ROS     Musculoskeletal:  - neg musculoskeletal ROS     GI/Hepatic:     (+) GERD, Inflammatory bowel disease,     Renal/Genitourinary:  - neg Renal ROS     Endo:  - neg endo ROS     Psychiatric/Substance Use:     (+) psychiatric history     Infectious Disease:  - neg infectious disease ROS  (-) Recent Fever   Malignancy:       Other:            Physical Exam    Airway  airway exam normal      Mallampati: II   TM distance: > 3 FB   Neck ROM: full   Mouth opening: > 3 cm    Respiratory Devices and Support         Dental  no notable dental history         Cardiovascular    cardiovascular exam normal       Rhythm and rate: regular and normal     Pulmonary   pulmonary exam normal        breath sounds clear to auscultation           OUTSIDE LABS:  CBC:   Lab Results   Component Value Date    WBC 13.1 (H) 12/30/2019    WBC 9.8 12/29/2019    HGB 10.0 (L) 12/31/2019    HGB 11.7 (L) 12/30/2019    HCT 34.4 (L) 12/30/2019    HCT 32.7 (L) 12/29/2019     12/30/2019     12/29/2019     BMP:   Lab Results   Component Value Date     04/20/2022     02/19/2021    POTASSIUM 4.1 04/20/2022    POTASSIUM 4.6 02/19/2021    CHLORIDE 103 04/20/2022    CHLORIDE 106 02/19/2021    CO2 23 04/20/2022    CO2 29 02/19/2021    BUN 12 04/20/2022    BUN 14 02/19/2021    CR 0.78 04/20/2022    CR 0.78 02/19/2021    GLC 84 04/20/2022     02/19/2021     COAGS:   Lab Results   Component Value Date    PTT 26 12/30/2019    INR 0.97 12/30/2019     POC:   Lab Results   Component Value Date    HCGS Negative 03/14/2022     HEPATIC:   Lab Results   Component Value Date    ALBUMIN 4.1 12/29/2020    PROTTOTAL 7.0 12/29/2020    ALT 17 12/29/2020    AST 17 12/29/2020    ALKPHOS 48 12/29/2020    BILITOTAL 0.5 12/29/2020     OTHER:   Lab Results   Component Value Date    CLAUDIA 9.7 04/20/2022    MAG 5.9 (HH) 12/31/2019    TSH 1.65 05/08/2020       Anesthesia Plan    ASA Status:  2      Anesthesia Type: General.     - Airway: ETT      Maintenance: Balanced.        Consents    Anesthesia Plan(s) and associated risks, benefits, and realistic alternatives discussed. Questions answered and patient/representative(s) expressed understanding.     - Discussed: Risks, Benefits and Alternatives for BOTH SEDATION and the PROCEDURE were discussed     - Discussed with:  Patient         Postoperative Care    Pain management: Multi-modal analgesia.   PONV prophylaxis: Ondansetron (or other 5HT-3), Dexamethasone or Solumedrol, Background Propofol Infusion     Comments:                Henrry Merino MD

## 2022-08-03 NOTE — ANESTHESIA CARE TRANSFER NOTE
Patient: Anisa Love    Procedure: Procedure(s):  DIAGNOSTIC LAPAROSCOPY, excision of endometrial cysts and cauterizaton of endometrial cysts       Diagnosis: Pelvic pain [R10.2]  Diagnosis Additional Information: No value filed.    Anesthesia Type:   General     Note:    Oropharynx: oropharynx clear of all foreign objects  Level of Consciousness: awake  Oxygen Supplementation: face mask  Level of Supplemental Oxygen (L/min / FiO2): 8  Independent Airway: airway patency satisfactory and stable  Dentition: dentition unchanged  Vital Signs Stable: post-procedure vital signs reviewed and stable  Report to RN Given: handoff report given  Patient transferred to: PACU    Handoff Report: Identifed the Patient, Identified the Reponsible Provider, Reviewed the pertinent medical history, Discussed the surgical course, Reviewed Intra-OP anesthesia mangement and issues during anesthesia, Set expectations for post-procedure period and Allowed opportunity for questions and acknowledgement of understanding      Vitals:  Vitals Value Taken Time   /84 08/03/22 1045   Temp 97.1  F (36.2  C) 08/03/22 1045   Pulse 66 08/03/22 1045   Resp 16 08/03/22 1045   SpO2 100 % 08/03/22 1045       Electronically Signed By: HERNÁN Joe CRNA  August 3, 2022  10:47 AM

## 2022-08-03 NOTE — OP NOTE
Laparoscopy    Name: Anisa Love     MRN: [id number]      DATE OF SERVICE: 8/3/2022     PREOPERATIVE DIAGNOSIS: Chronic pelvic pain    POSTOPERATIVE DIAGNOSIS: Same    PROCEDURES: laparoscopy; excision of uterosacral bilateral endometriotic implants, cauterization of endometriotic implants.    SURGEON: Minerva York     ASSISTANT: Angie Lord  ANESTHESIA: general     ESTIMATED BLOOD LOSS: 5    HISTORY OF PRESENT ILLNESS:   Anias Love is a 42 year old  year old female with history of pelvic pain at the left lower quadrant for years.  Off-and-on cystic areas as well.  She went on a birth control pill to see if that would help she is unsure if it did or not.  She is been struggling off and on through the years so we discussed evaluating with laparoscopy.  Work-up by GI has been normal also question that some of the discomfort on the left lower quadrant could be more fascia related.  CT and imaging were normal.     We discussed potential for endometriosis and evaluation.    Findings: Endometriotic implants on the left uterosacral ligament.  Right uterosacral ligament, left anterior peritoneum.  Small endometrial implants on the peritoneum of the bladder and posterior cul-de-sac      PROCEDURE:  She was met preoperatively, where we discussed the procedure and the risks associated with the procedure. She understood these to be, but not limited to injury to adjacent organs including bladder, bowel, ureter, infection and bleeding. Patient signed consent and was brought back to the operating room in stable condition.     Patient underwent induction of a general anesthetic, she was carefully prepped and draped for the procedure. Timeout was performed. Bladder was drained with a Cheatham catheter, uterine manipulator placed into the uterus.     Attention was turned to the abdomen. An incision above the umbilicus. A Veress needle was introduced with a 2 pop technique, saline drop test confirmed adequate placement.  Opening pressure was 3-4. Insufflation was now done until 15mm of pressure were established. A 5 nonbladed trocar was placed above the umbilicus. Two more port sights were place in the right and left lower quadrants under direct visualization. Trandelenburg assistance was then used.     The left uterosacral ligament area was identified and the nodule was removed with electrocautery.  Similarly the right uterosacral nodule of endometriosis was removed.  A larger nodule to the left peritoneum away from the bladder anteriorly was also removed.  A small little implants were then cauterized.    Both adnexa were normal uterus was normal liver edge was normal.  There was an omental adhesion anteriorly which was taken down without difficulty.      Surgicel powder was used lightly on the operative areas.  Procedure was now complete    The trocars were then removed and the gas was removed from the abdomen. Skin was closed with monocryl suture. Steri-Strips applied. Instruments were removed from vagina. No active bleeding was noted. Sponge and needle counts were correct X 2. She was taken to recovery in stable condition.       Minerva York MD

## 2022-08-03 NOTE — PROGRESS NOTES
I, the writer, have viewed a picture on the patient's phone showing a Negative COVID-19 result.    John Hood RN on 8/3/2022 at 8:15 AM

## 2022-08-03 NOTE — INTERVAL H&P NOTE
"I have reviewed the surgical (or preoperative) H&P that is linked to this encounter, and examined the patient. There are no significant changes    Clinical Conditions Present on Arrival:  Clinically Significant Risk Factors Present on Admission                   # Obesity: Estimated body mass index is 30.9 kg/m  as calculated from the following:    Height as of this encounter: 1.626 m (5' 4\").    Weight as of this encounter: 81.6 kg (180 lb).       "

## 2022-08-03 NOTE — DISCHARGE INSTRUCTIONS
If you have any questions or concerns regarding your procedure, please contact Dr. York, her office number is [phone].       You received a medication called Toradol (a stronger IV ibuprofen) at 10:30 AM. Do NOT take any Ibuprofen / Advil / Motrin / Aleve / Naproxen or products containing Ibuprofen until 4:30 PM or later.       Pelvic Laparoscopy    Laparoscopy is a type of surgery done using very small incisions. This type of surgery is possible because of the laparoscope. This is a long, slender tool with a camera and light. It lets your surgeon see inside the stomach. To do the surgery, the surgeon puts special instruments into the stomach through small incisions. Pelvic laparoscopy is often used to diagnose and treat the causes of pelvic problems, such as pain and infertility. Laparoscopy often involves:   A short hospital stay (you can most likely go home the same day.)  A quick recovery  Minimal anesthesia  Small external scars  Mild to moderate postoperative pain    How pelvic laparoscopy is done    The surgeon makes one or more small (quarter- or half-inch) incisions near the navel or the pubic hairline, or on either side of the lower belly. The laparoscope is inserted through an incision. It sends images to a video screen, allowing the surgeon a close-up view of the organs. The surgeon uses gas to inflate the belly, allowing them room to see and work. Depending on what is found, surgery to treat the problem may be done at this time.     Your recovery    Recovery time depends on which procedure was done through the laparoscope. Your recovery from pelvic laparoscopy may take up to 6 weeks. If it was a simple procedure such as tubal ligation, then 2 weeks is reasonable. For laparoscopic hysterectomies, the recovery may be closer to 6 weeks. While you recover, be sure to follow your healthcare provider s instructions. During this time:   Take pain medicine as prescribed.  Start eating solid food when  you feel ready. To prevent constipation, eat fruits, vegetables, and whole grains. Drink plenty of fluids.  Don t lift anything heavy until your healthcare provider says it s safe.  Take it easy for a few days. Ask your healthcare provider when you can return to work, exercise, and sex.  Arrange for a follow-up visit with your healthcare provider to discuss the results of the procedure.    When to call your healthcare provider    Call your healthcare provider right away if you have any of the following:   Have a fever of 101.5 F (38.6 C) or higher  Chills  Notice that the incision is red, swollen, or draining  Have heavy, bright-red vaginal bleeding or a smelly discharge  Have trouble urinating  Experience severe belly pain or bloating  Have leg pain, redness, or swelling  Have persistent nausea or vomiting  Are not improving daily  Fainting  Trouble breathing    Coping with pain    *Pain after surgery is normal and expected*    If you have pain after surgery, pain medicine will help you feel better. Take it as told, before pain becomes severe. Also, ask your healthcare provider or pharmacist about other ways to control pain. This might be with heat, ice, or relaxation. And follow any other instructions your surgeon or nurse gives you.    Tips for taking pain medicine    To get the best relief possible, remember these points:    Pain medicines can upset your stomach. Taking them with a little food may help.  Most pain relievers taken by mouth need at least 20 to 30 minutes to start to work.  Don't wait till your pain becomes severe before you take your medicine. Try to time your medicine so that you can take it before starting an activity. This might be before you get dressed, go for a walk, or sit down for dinner.  Constipation is a common side effect of pain medicines. You can take medicines such as laxatives (Miralax) or stool softeners to help ease constipation. Drinking lots of fluids and eating foods such as  fruits and vegetables that are high in fiber can also help.  Drinking alcohol and taking pain medicine can cause dizziness and slow your breathing. It can even be deadly. Don't drink alcohol while taking pain medicine.  Pain medicine can make you react more slowly to things. Don't drive or run machinery while taking pain medicine.  Your healthcare provider may tell you to take acetaminophen to help ease your pain. Ask him or her how much you are supposed to take each day. Acetaminophen or other pain relievers may interact with your prescription medicines or other over-the-counter (OTC) medicines. Some prescription medicines have acetaminophen and other ingredients. Using both prescription and OTC acetaminophen for pain can cause you to overdose. Read the labels on your OTC medicines with care. This will help you to clearly know the list of ingredients, how much to take, and any warnings. It may also help you not take too much acetaminophen. If you have questions or don't understand the information, ask your pharmacist or healthcare provider to explain it to you before you take the OTC medicine.    Discharge Instructions: After Your Surgery, regarding Anesthesia    You ve just had surgery. During surgery, you were given medicine called anesthesia to keep you relaxed and free of pain. After surgery, you may have some pain or nausea. This is common. Here are some tips for feeling better and getting well after surgery.    Going home    Your healthcare provider will show you how to take care of yourself when you go home. He or she will also answer your questions. Have an adult family member or friend drive you home. For the first 24 hours after your surgery:    Don't drive or use heavy equipment.  Don't make important decisions or sign legal papers.  Don't drink alcohol.  Have someone stay with you for the next 24 hours. He or she can watch for problems and help keep you safe.  Be sure to go to all follow-up visits with  your healthcare provider. And rest after your surgery for as long as your healthcare provider tells you to.    Managing nausea    Some people have an upset stomach after surgery. This is often because of anesthesia, pain, or pain medicine, or the stress of surgery. These tips will help you handle nausea and eat healthy foods as you get better. If you were on a special food plan before surgery, ask your healthcare provider if you should follow it while you get better. These tips may help:    Don't push yourself to eat. Your body will tell you when to eat and how much.  Start off with clear liquids and soup. They are easier to digest.  Next try semi-solid foods, such as mashed potatoes, applesauce, and gelatin, as you feel ready.  Slowly move to solid foods. Don t eat fatty, rich, or spicy foods at first.  Don't force yourself to have 3 large meals a day. Instead eat smaller amounts more often.  Take pain medicines with a small amount of solid food, such as crackers or toast, to prevent nausea.    If you have obstructive sleep apnea    You were given anesthesia medicine during surgery to keep you comfortable and free of pain. After surgery, you may have more apnea spells because of this medicine and other medicines you were given. The spells may last longer than usual.   At home:    Keep using the continuous positive airway pressure (CPAP) device when you sleep. Unless your healthcare provider tells you not to, use it when you sleep, day or night. CPAP is a common device used to treat obstructive sleep apnea.  Talk with your provider before taking any pain medicine, muscle relaxants, or sedatives. Your provider will tell you about the possible dangers of taking these medicines.    If you have any questions or concerns regarding your procedure, please contact Dr. York, her office number is [phone].

## 2022-09-18 ENCOUNTER — HEALTH MAINTENANCE LETTER (OUTPATIENT)
Age: 42
End: 2022-09-18

## 2022-09-19 ENCOUNTER — LAB REQUISITION (OUTPATIENT)
Dept: LAB | Facility: CLINIC | Age: 42
End: 2022-09-19

## 2022-09-19 DIAGNOSIS — I10 ESSENTIAL (PRIMARY) HYPERTENSION: ICD-10-CM

## 2022-09-19 LAB
ANION GAP SERPL CALCULATED.3IONS-SCNC: 10 MMOL/L (ref 7–15)
BUN SERPL-MCNC: 12.6 MG/DL (ref 6–20)
CALCIUM SERPL-MCNC: 9.4 MG/DL (ref 8.6–10)
CHLORIDE SERPL-SCNC: 104 MMOL/L (ref 98–107)
CREAT SERPL-MCNC: 0.66 MG/DL (ref 0.51–0.95)
DEPRECATED HCO3 PLAS-SCNC: 25 MMOL/L (ref 22–29)
GFR SERPL CREATININE-BSD FRML MDRD: >90 ML/MIN/1.73M2
GLUCOSE SERPL-MCNC: 89 MG/DL (ref 70–99)
POTASSIUM SERPL-SCNC: 4 MMOL/L (ref 3.4–5.3)
SODIUM SERPL-SCNC: 139 MMOL/L (ref 136–145)

## 2022-09-19 PROCEDURE — 82310 ASSAY OF CALCIUM: CPT | Performed by: FAMILY MEDICINE

## 2022-10-06 ENCOUNTER — HOSPITAL ENCOUNTER (EMERGENCY)
Facility: HOSPITAL | Age: 42
Discharge: HOME | End: 2022-10-06
Attending: EMERGENCY MEDICINE | Admitting: EMERGENCY MEDICINE
Payer: COMMERCIAL

## 2022-10-06 VITALS
SYSTOLIC BLOOD PRESSURE: 120 MMHG | WEIGHT: 175 LBS | HEART RATE: 89 BPM | OXYGEN SATURATION: 98 % | DIASTOLIC BLOOD PRESSURE: 76 MMHG | TEMPERATURE: 97.2 F | HEIGHT: 64 IN | RESPIRATION RATE: 18 BRPM | BODY MASS INDEX: 29.88 KG/M2

## 2022-10-06 DIAGNOSIS — R19.7 NAUSEA, VOMITING, AND DIARRHEA: ICD-10-CM

## 2022-10-06 DIAGNOSIS — R11.2 NAUSEA, VOMITING, AND DIARRHEA: ICD-10-CM

## 2022-10-06 DIAGNOSIS — R10.84 GENERALIZED ABDOMINAL PAIN: ICD-10-CM

## 2022-10-06 LAB
ALBUMIN SERPL BCG-MCNC: 4.6 G/DL (ref 3.5–5.2)
ALBUMIN UR-MCNC: 20 MG/DL
ALP SERPL-CCNC: 43 U/L (ref 35–104)
ALT SERPL W P-5'-P-CCNC: 52 U/L (ref 10–35)
ANION GAP SERPL CALCULATED.3IONS-SCNC: 11 MMOL/L (ref 7–15)
APPEARANCE UR: ABNORMAL
AST SERPL W P-5'-P-CCNC: 94 U/L (ref 10–35)
BACTERIA #/AREA URNS HPF: ABNORMAL /HPF
BASOPHILS # BLD AUTO: 0 10E3/UL (ref 0–0.2)
BASOPHILS NFR BLD AUTO: 0 %
BILIRUB DIRECT SERPL-MCNC: 0.34 MG/DL (ref 0–0.3)
BILIRUB SERPL-MCNC: 1.4 MG/DL
BILIRUB UR QL STRIP: NEGATIVE
BUN SERPL-MCNC: 11.3 MG/DL (ref 6–20)
CALCIUM SERPL-MCNC: 8.6 MG/DL (ref 8.6–10)
CHLORIDE SERPL-SCNC: 98 MMOL/L (ref 98–107)
COLOR UR AUTO: YELLOW
CREAT SERPL-MCNC: 0.72 MG/DL (ref 0.51–0.95)
DEPRECATED HCO3 PLAS-SCNC: 26 MMOL/L (ref 22–29)
EOSINOPHIL # BLD AUTO: 0.1 10E3/UL (ref 0–0.7)
EOSINOPHIL NFR BLD AUTO: 1 %
ERYTHROCYTE [DISTWIDTH] IN BLOOD BY AUTOMATED COUNT: 12.5 % (ref 10–15)
GFR SERPL CREATININE-BSD FRML MDRD: >90 ML/MIN/1.73M2
GLUCOSE SERPL-MCNC: 133 MG/DL (ref 70–99)
GLUCOSE UR STRIP-MCNC: NEGATIVE MG/DL
HCG UR QL: NEGATIVE
HCT VFR BLD AUTO: 43.2 % (ref 35–47)
HGB BLD-MCNC: 14.7 G/DL (ref 11.7–15.7)
HGB UR QL STRIP: ABNORMAL
IMM GRANULOCYTES # BLD: 0.1 10E3/UL
IMM GRANULOCYTES NFR BLD: 1 %
KETONES UR STRIP-MCNC: ABNORMAL MG/DL
LEUKOCYTE ESTERASE UR QL STRIP: ABNORMAL
LIPASE SERPL-CCNC: 25 U/L (ref 13–60)
LYMPHOCYTES # BLD AUTO: 0.6 10E3/UL (ref 0.8–5.3)
LYMPHOCYTES NFR BLD AUTO: 5 %
MCH RBC QN AUTO: 29.9 PG (ref 26.5–33)
MCHC RBC AUTO-ENTMCNC: 34 G/DL (ref 31.5–36.5)
MCV RBC AUTO: 88 FL (ref 78–100)
MONOCYTES # BLD AUTO: 0.6 10E3/UL (ref 0–1.3)
MONOCYTES NFR BLD AUTO: 5 %
MUCOUS THREADS #/AREA URNS LPF: PRESENT /LPF
NEUTROPHILS # BLD AUTO: 11 10E3/UL (ref 1.6–8.3)
NEUTROPHILS NFR BLD AUTO: 88 %
NITRATE UR QL: NEGATIVE
NRBC # BLD AUTO: 0 10E3/UL
NRBC BLD AUTO-RTO: 0 /100
PH UR STRIP: 6 [PH] (ref 5–7)
PLATELET # BLD AUTO: 286 10E3/UL (ref 150–450)
POTASSIUM SERPL-SCNC: 4 MMOL/L (ref 3.4–5.3)
PROT SERPL-MCNC: 7.3 G/DL (ref 6.4–8.3)
RBC # BLD AUTO: 4.92 10E6/UL (ref 3.8–5.2)
RBC URINE: 3 /HPF
SODIUM SERPL-SCNC: 135 MMOL/L (ref 136–145)
SP GR UR STRIP: 1.02 (ref 1–1.03)
SQUAMOUS EPITHELIAL: 1 /HPF
UROBILINOGEN UR STRIP-MCNC: 3 MG/DL
WBC # BLD AUTO: 12.2 10E3/UL (ref 4–11)
WBC URINE: 1 /HPF

## 2022-10-06 PROCEDURE — 36415 COLL VENOUS BLD VENIPUNCTURE: CPT | Performed by: STUDENT IN AN ORGANIZED HEALTH CARE EDUCATION/TRAINING PROGRAM

## 2022-10-06 PROCEDURE — 81001 URINALYSIS AUTO W/SCOPE: CPT | Performed by: EMERGENCY MEDICINE

## 2022-10-06 PROCEDURE — 250N000013 HC RX MED GY IP 250 OP 250 PS 637: Performed by: EMERGENCY MEDICINE

## 2022-10-06 PROCEDURE — 81025 URINE PREGNANCY TEST: CPT | Performed by: EMERGENCY MEDICINE

## 2022-10-06 PROCEDURE — 81025 URINE PREGNANCY TEST: CPT | Performed by: STUDENT IN AN ORGANIZED HEALTH CARE EDUCATION/TRAINING PROGRAM

## 2022-10-06 PROCEDURE — 83690 ASSAY OF LIPASE: CPT | Performed by: EMERGENCY MEDICINE

## 2022-10-06 PROCEDURE — 99284 EMERGENCY DEPT VISIT MOD MDM: CPT | Mod: 25

## 2022-10-06 PROCEDURE — 250N000011 HC RX IP 250 OP 636: Performed by: STUDENT IN AN ORGANIZED HEALTH CARE EDUCATION/TRAINING PROGRAM

## 2022-10-06 PROCEDURE — 85025 COMPLETE CBC W/AUTO DIFF WBC: CPT | Performed by: STUDENT IN AN ORGANIZED HEALTH CARE EDUCATION/TRAINING PROGRAM

## 2022-10-06 PROCEDURE — 85025 COMPLETE CBC W/AUTO DIFF WBC: CPT | Performed by: EMERGENCY MEDICINE

## 2022-10-06 PROCEDURE — 82248 BILIRUBIN DIRECT: CPT | Performed by: EMERGENCY MEDICINE

## 2022-10-06 PROCEDURE — 82248 BILIRUBIN DIRECT: CPT | Performed by: STUDENT IN AN ORGANIZED HEALTH CARE EDUCATION/TRAINING PROGRAM

## 2022-10-06 PROCEDURE — 96374 THER/PROPH/DIAG INJ IV PUSH: CPT

## 2022-10-06 PROCEDURE — 81001 URINALYSIS AUTO W/SCOPE: CPT | Performed by: STUDENT IN AN ORGANIZED HEALTH CARE EDUCATION/TRAINING PROGRAM

## 2022-10-06 PROCEDURE — 83690 ASSAY OF LIPASE: CPT | Performed by: STUDENT IN AN ORGANIZED HEALTH CARE EDUCATION/TRAINING PROGRAM

## 2022-10-06 RX ORDER — ONDANSETRON 2 MG/ML
4 INJECTION INTRAMUSCULAR; INTRAVENOUS
Status: COMPLETED | OUTPATIENT
Start: 2022-10-06 | End: 2022-10-06

## 2022-10-06 RX ORDER — ONDANSETRON 4 MG/1
4 TABLET, ORALLY DISINTEGRATING ORAL EVERY 6 HOURS PRN
Qty: 10 TABLET | Refills: 0 | Status: SHIPPED | OUTPATIENT
Start: 2022-10-06 | End: 2022-10-13

## 2022-10-06 RX ORDER — DICYCLOMINE HCL 20 MG
20 TABLET ORAL 4 TIMES DAILY PRN
Qty: 12 TABLET | Refills: 0 | Status: SHIPPED | OUTPATIENT
Start: 2022-10-06 | End: 2022-10-16

## 2022-10-06 RX ORDER — DICYCLOMINE HCL 20 MG
20 TABLET ORAL ONCE
Status: COMPLETED | OUTPATIENT
Start: 2022-10-06 | End: 2022-10-06

## 2022-10-06 RX ADMIN — ONDANSETRON 4 MG: 2 INJECTION INTRAMUSCULAR; INTRAVENOUS at 00:57

## 2022-10-06 RX ADMIN — DICYCLOMINE HYDROCHLORIDE 20 MG: 20 TABLET ORAL at 03:04

## 2022-10-06 ASSESSMENT — ENCOUNTER SYMPTOMS
VOMITING: 1
BLOOD IN STOOL: 0
ROS GI COMMENTS: NEGATIVE FOR HEMATEMESIS
DIARRHEA: 1
NAUSEA: 1
ABDOMINAL PAIN: 1
FEVER: 0

## 2022-10-06 NOTE — ED PROVIDER NOTES
NAME: Anisa Love  AGE: 42 year old female  YOB: 1980  MRN: [id number]  EVALUATION DATE & TIME: No admission date for patient encounter.    PCP: Tiffani Cassidy    ED PROVIDER: Avni Solo M.D.      Chief Complaint   Patient presents with     Abdominal Pain     Nausea, Vomiting, & Diarrhea         FINAL IMPRESSION:  1. Generalized abdominal pain    2. Nausea, vomiting, and diarrhea        MEDICAL DECISION MAKIN:18 AM I met with the patient, obtained history, performed an initial exam, and discussed options and plan for diagnostics and treatment here in the ED.  2:39 AM Rechecked with patient to discuss lab results. She wanted to go home and call her PCP tomorrow morning.   Patient was clinically assessed and consented to treatment. After assessment, medical decision making and workup were discussed with the patient. The patient was agreeable to plan for testing, workup, and treatment.  Pertinent Labs & Imaging studies reviewed. (See chart for details)         Anisa Love is a 42 year old female who presents with abdominal pain with nausea vomiting and diarrhea..   Differential diagnosis includes but not limited to gastroenteritis, gastritis, colitis, C. difficile, dehydration..  Patient with alternating nausea, vomiting, diarrhea.  Patient appears otherwise well and labs obtained from triage showed only slight elevation of LFTs and leukocytosis.  Patient reports that she has seen GI for episodes like this before but usually the shorter acting.  Patient possibly with new findings and gastroenteritis likely.  She does not recall any eating anything abnormal and no sick contacts.  Patient's abdomen is nonspecifically tender anywhere and after discussion with patient she would prefer to go home and get over this as opposed to remaining for imaging.  Given the abdomen findings and labs without remarkable surgical findings on abdomen I would allow patient to go home.  Zofran to be given  as needed and Bentyl for the cramps.  Patient given a sample container and recommended to obtain a stool sample for possible C. difficile or enteric pathology.    0 minutes of critical care time    MEDICATIONS GIVEN IN THE EMERGENCY:  Medications   ondansetron (ZOFRAN) injection 4 mg (4 mg Intravenous Given 10/6/22 005)   dicyclomine (BENTYL) tablet 20 mg (20 mg Oral Given 10/6/22 0304)       NEW PRESCRIPTIONS STARTED AT TODAY'S ER VISIT:  Discharge Medication List as of 10/6/2022  2:55 AM      START taking these medications    Details   dicyclomine (BENTYL) 20 MG tablet Take 1 tablet (20 mg) by mouth 4 times daily as needed (abdominal cramps, diarrhea.), Disp-12 tablet, R-0, Local Print      ondansetron (ZOFRAN ODT) 4 MG ODT tab Take 1 tablet (4 mg) by mouth every 6 hours as needed for nausea, Disp-10 tablet, R-0, Local Print                =================================================================    HPI    Patient information was obtained from: Patient    Use of : N/A       Anisa Love is a 42 year old female with a past medical history of s/p , ANA MARIA, and HTN who presents to the ED for evaluation of abdominal pain, nausea, vomiting, and diarrhea.    Patient reports ongoing diffuse abdominal pain that began early yesterday morning. She also complains of nausea, vomiting, and diarrhea that began around 4 PM yesterday. Patient states that the diarrhea is clear and has a lot of undigested food, and complains of associated increase in abdominal pain before and during the diarrhea. In addition, she reports that these symptoms come in waves that usually fluctuate in pain and last a couple of hours. Patient states she does have a history of these type of symptoms this past , however, today it has been more severe. She otherwise denies blood in stool, hematemesis, rashes, or fever. Patient has not taken any pain medications, and the Zofran she received in triage did not provide much relief  to her nausea.      REVIEW OF SYSTEMS   Review of Systems   Constitutional: Negative for fever.   Gastrointestinal: Positive for abdominal pain (diffuse), diarrhea, nausea and vomiting. Negative for blood in stool.        Negative for hematemesis    Skin: Negative for rash.   All other systems reviewed and are negative.       PAST MEDICAL HISTORY:  Past Medical History:   Diagnosis Date     Depression      GERD (gastroesophageal reflux disease)      Gonorrhea      HPV (human papilloma virus) anogenital infection      Hypertension      Mental disorder      Noninfectious ileitis      PONV (postoperative nausea and vomiting)      Ulcerative colitis (H)        PAST SURGICAL HISTORY:  Past Surgical History:   Procedure Laterality Date      SECTION N/A 2019    Procedure:  SECTION;  Surgeon: Magi Ayala MD;  Location: [facility]D OR;  Service: Obstetrics     LAPAROSCOPIC CHOLECYSTECTOMY  2019     LAPAROSCOPY DIAGNOSTIC (GYN) N/A 8/3/2022    Procedure: DIAGNOSTIC LAPAROSCOPY, excision of endometrial cysts and cauterizaton of endometrial cysts;  Surgeon: Minerva York MD;  Location: [facility]     WISDOM TOOTH EXTRACTION         CURRENT MEDICATIONS:    No current facility-administered medications for this encounter.    Current Outpatient Medications:      dicyclomine (BENTYL) 20 MG tablet, Take 1 tablet (20 mg) by mouth 4 times daily as needed (abdominal cramps, diarrhea.), Disp: 12 tablet, Rfl: 0     ondansetron (ZOFRAN ODT) 4 MG ODT tab, Take 1 tablet (4 mg) by mouth every 6 hours as needed for nausea, Disp: 10 tablet, Rfl: 0     acyclovir (ZOVIRAX) 400 MG tablet, Take 400 mg by mouth 3 times daily as needed, Disp: , Rfl:      amLODIPine (NORVASC) 5 MG tablet, Take 10 mg by mouth daily, Disp: , Rfl:      ARIPiprazole (ABILIFY) 5 MG tablet, Take 5 mg by mouth daily, Disp: , Rfl:      cetirizine (ZYRTEC) 10 MG tablet, Take 10 mg by mouth daily, Disp: , Rfl:       "FLUoxetine (PROZAC) 40 MG capsule, Take 40 mg by mouth daily, Disp: , Rfl:      losartan (COZAAR) 50 MG tablet, Take 50 mg by mouth daily, Disp: , Rfl:      traZODone (DESYREL) 100 MG tablet, Take  mg by mouth At Bedtime, Disp: , Rfl:     ALLERGIES:  No Known Allergies    FAMILY HISTORY:  No family history on file.    SOCIAL HISTORY:   Social History     Socioeconomic History     Marital status: Single   Tobacco Use     Smoking status: Current Some Day Smoker     Packs/day: 0.20     Smokeless tobacco: Never Used   Substance and Sexual Activity     Alcohol use: Yes     Comment: Occasional     Drug use: Never       PHYSICAL EXAM:    Vitals: /76   Pulse 89   Temp 97.2  F (36.2  C) (Temporal)   Resp 18   Ht 1.626 m (5' 4\")   Wt 79.4 kg (175 lb)   LMP 09/15/2022   SpO2 98%   Breastfeeding No   BMI 30.04 kg/m     Physical Exam  Vitals and nursing note reviewed.   Constitutional:       General: She is not in acute distress.     Appearance: She is well-developed and normal weight. She is not ill-appearing, toxic-appearing or diaphoretic.   HENT:      Head: Normocephalic.      Mouth/Throat:      Comments: Dry mucous membranes  Eyes:      General: No scleral icterus.     Extraocular Movements: Extraocular movements intact.   Cardiovascular:      Rate and Rhythm: Normal rate and regular rhythm.      Heart sounds: Normal heart sounds.   Pulmonary:      Effort: Pulmonary effort is normal. No respiratory distress.      Breath sounds: Normal breath sounds.   Abdominal:      General: Abdomen is flat. There is no distension.      Palpations: Abdomen is soft.      Tenderness: There is no abdominal tenderness. There is no right CVA tenderness, left CVA tenderness, guarding or rebound.      Hernia: No hernia is present.   Skin:     General: Skin is warm and dry.   Neurological:      General: No focal deficit present.      Mental Status: She is alert and oriented to person, place, and time.   Psychiatric:         " Behavior: Behavior normal.           LAB:  All pertinent labs reviewed and interpreted.  Labs Ordered and Resulted from Time of ED Arrival to Time of ED Departure   BASIC METABOLIC PANEL - Abnormal       Result Value    Sodium 135 (*)     Potassium 4.0      Chloride 98      Carbon Dioxide (CO2) 26      Anion Gap 11      Urea Nitrogen 11.3      Creatinine 0.72      Calcium 8.6      Glucose 133 (*)     GFR Estimate >90     HEPATIC FUNCTION PANEL - Abnormal    Protein Total 7.3      Albumin 4.6      Bilirubin Total 1.4 (*)     Alkaline Phosphatase 43      AST 94 (*)     ALT 52 (*)     Bilirubin Direct 0.34 (*)    ROUTINE UA WITH MICROSCOPIC REFLEX TO CULTURE - Abnormal    Color Urine Yellow      Appearance Urine Turbid (*)     Glucose Urine Negative      Bilirubin Urine Negative      Ketones Urine Trace (*)     Specific Gravity Urine 1.025      Blood Urine 0.2 mg/dL (*)     pH Urine 6.0      Protein Albumin Urine 20  (*)     Urobilinogen Urine 3.0 (*)     Nitrite Urine Negative      Leukocyte Esterase Urine 25 Narda/uL (*)     Bacteria Urine Few (*)     Mucus Urine Present (*)     RBC Urine 3 (*)     WBC Urine 1      Squamous Epithelials Urine 1     CBC WITH PLATELETS AND DIFFERENTIAL - Abnormal    WBC Count 12.2 (*)     RBC Count 4.92      Hemoglobin 14.7      Hematocrit 43.2      MCV 88      MCH 29.9      MCHC 34.0      RDW 12.5      Platelet Count 286      % Neutrophils 88      % Lymphocytes 5      % Monocytes 5      % Eosinophils 1      % Basophils 0      % Immature Granulocytes 1      NRBCs per 100 WBC 0      Absolute Neutrophils 11.0 (*)     Absolute Lymphocytes 0.6 (*)     Absolute Monocytes 0.6      Absolute Eosinophils 0.1      Absolute Basophils 0.0      Absolute Immature Granulocytes 0.1      Absolute NRBCs 0.0     LIPASE - Normal    Lipase 25     HCG QUALITATIVE URINE - Normal    hCG Urine Qualitative Negative           Kevan ALEMAN am serving as a scribe to document services personally performed by   Avni Solo  based on my observation and the provider's statements to me. I, Avni Solo MD attest that Kevan Liriano is acting in a scribe capacity, has observed my performance of the services and has documented them in accordance with my direction.      Avni Solo M.D.  Emergency Medicine  [facility] Emergency Department     Avni Solo MD  10/06/22 0510

## 2022-10-06 NOTE — ED TRIAGE NOTES
Patient reports diarrhea today and abd pain. N/V starting about 6 hrs ago. States this has happened in the past and was told by GI to come to ED if it happened again to help with dx.     Triage Assessment     Row Name 10/06/22 0034       Triage Assessment (Adult)    Airway WDL WDL       Respiratory WDL    Respiratory WDL WDL       Skin Circulation/Temperature WDL    Skin Circulation/Temperature WDL WDL       Cardiac WDL    Cardiac WDL WDL       Peripheral/Neurovascular WDL    Peripheral Neurovascular WDL WDL       Cognitive/Neuro/Behavioral WDL    Cognitive/Neuro/Behavioral WDL WDL

## 2022-10-06 NOTE — DISCHARGE INSTRUCTIONS
As discussed in the ER we would consider pursuing imaging if you wish however may monitor symptoms at home.  Recommend coming back for imaging and further work-up if fevers, vomiting is not controlled with home medication, or worsening pain.  Also recommend close follow-up with her primary doctor.  Additionally you may try to obtain a stool sample which could be tested for infectious source of symptoms.

## 2022-10-07 LAB
C COLI+JEJUNI+LARI FUSA STL QL NAA+PROBE: NOT DETECTED
C DIFF TOX B STL QL: ABNORMAL
EC STX1 GENE STL QL NAA+PROBE: NOT DETECTED
EC STX2 GENE STL QL NAA+PROBE: NOT DETECTED
NOROV GI+II ORF1-ORF2 JNC STL QL NAA+PR: NOT DETECTED
RVA NSP5 STL QL NAA+PROBE: NOT DETECTED
SALMONELLA SP RPOD STL QL NAA+PROBE: NOT DETECTED
SHIGELLA SP+EIEC IPAH STL QL NAA+PROBE: NOT DETECTED
V CHOL+PARA RFBL+TRKH+TNAA STL QL NAA+PR: NOT DETECTED
Y ENTERO RECN STL QL NAA+PROBE: NOT DETECTED

## 2022-10-07 PROCEDURE — 87506 IADNA-DNA/RNA PROBE TQ 6-11: CPT | Performed by: EMERGENCY MEDICINE

## 2022-10-07 PROCEDURE — 87493 C DIFF AMPLIFIED PROBE: CPT | Mod: XU | Performed by: EMERGENCY MEDICINE

## 2023-07-06 ENCOUNTER — LAB REQUISITION (OUTPATIENT)
Dept: LAB | Facility: CLINIC | Age: 43
End: 2023-07-06

## 2023-07-06 DIAGNOSIS — R10.84 GENERALIZED ABDOMINAL PAIN: ICD-10-CM

## 2023-07-06 LAB
ALBUMIN SERPL BCG-MCNC: 4.8 G/DL (ref 3.5–5.2)
ALP SERPL-CCNC: 38 U/L (ref 35–104)
ALT SERPL W P-5'-P-CCNC: 19 U/L (ref 0–50)
ANION GAP SERPL CALCULATED.3IONS-SCNC: 11 MMOL/L (ref 7–15)
AST SERPL W P-5'-P-CCNC: 20 U/L (ref 0–45)
BILIRUB SERPL-MCNC: 0.7 MG/DL
BUN SERPL-MCNC: 9.9 MG/DL (ref 6–20)
CALCIUM SERPL-MCNC: 9.7 MG/DL (ref 8.6–10)
CHLORIDE SERPL-SCNC: 101 MMOL/L (ref 98–107)
CREAT SERPL-MCNC: 0.79 MG/DL (ref 0.51–0.95)
DEPRECATED HCO3 PLAS-SCNC: 26 MMOL/L (ref 22–29)
GFR SERPL CREATININE-BSD FRML MDRD: >90 ML/MIN/1.73M2
GLUCOSE SERPL-MCNC: 101 MG/DL (ref 70–99)
POTASSIUM SERPL-SCNC: 4.3 MMOL/L (ref 3.4–5.3)
PROT SERPL-MCNC: 7.2 G/DL (ref 6.4–8.3)
SODIUM SERPL-SCNC: 138 MMOL/L (ref 136–145)

## 2023-07-06 PROCEDURE — 85025 COMPLETE CBC W/AUTO DIFF WBC: CPT | Performed by: STUDENT IN AN ORGANIZED HEALTH CARE EDUCATION/TRAINING PROGRAM

## 2023-07-06 PROCEDURE — 80053 COMPREHEN METABOLIC PANEL: CPT | Performed by: STUDENT IN AN ORGANIZED HEALTH CARE EDUCATION/TRAINING PROGRAM

## 2023-07-07 LAB
BASOPHILS # BLD AUTO: 0 10E3/UL (ref 0–0.2)
BASOPHILS NFR BLD AUTO: 0 %
EOSINOPHIL # BLD AUTO: 0 10E3/UL (ref 0–0.7)
EOSINOPHIL NFR BLD AUTO: 0 %
ERYTHROCYTE [DISTWIDTH] IN BLOOD BY AUTOMATED COUNT: 12.5 % (ref 10–15)
HCT VFR BLD AUTO: 45.9 % (ref 35–47)
HGB BLD-MCNC: 15.1 G/DL (ref 11.7–15.7)
IMM GRANULOCYTES # BLD: 0 10E3/UL
IMM GRANULOCYTES NFR BLD: 0 %
LYMPHOCYTES # BLD AUTO: 1.4 10E3/UL (ref 0.8–5.3)
LYMPHOCYTES NFR BLD AUTO: 21 %
MCH RBC QN AUTO: 29.5 PG (ref 26.5–33)
MCHC RBC AUTO-ENTMCNC: 32.9 G/DL (ref 31.5–36.5)
MCV RBC AUTO: 90 FL (ref 78–100)
MONOCYTES # BLD AUTO: 0.5 10E3/UL (ref 0–1.3)
MONOCYTES NFR BLD AUTO: 7 %
NEUTROPHILS # BLD AUTO: 4.8 10E3/UL (ref 1.6–8.3)
NEUTROPHILS NFR BLD AUTO: 72 %
NRBC # BLD AUTO: 0 10E3/UL
NRBC BLD AUTO-RTO: 0 /100
PLATELET # BLD AUTO: 331 10E3/UL (ref 150–450)
RBC # BLD AUTO: 5.11 10E6/UL (ref 3.8–5.2)
WBC # BLD AUTO: 6.7 10E3/UL (ref 4–11)

## 2023-07-26 ENCOUNTER — LAB REQUISITION (OUTPATIENT)
Dept: LAB | Facility: CLINIC | Age: 43
End: 2023-07-26

## 2023-07-26 DIAGNOSIS — R10.9 UNSPECIFIED ABDOMINAL PAIN: ICD-10-CM

## 2023-07-26 PROCEDURE — 83690 ASSAY OF LIPASE: CPT | Performed by: STUDENT IN AN ORGANIZED HEALTH CARE EDUCATION/TRAINING PROGRAM

## 2023-07-27 LAB — LIPASE SERPL-CCNC: 751 U/L (ref 13–60)

## 2023-07-28 ENCOUNTER — LAB REQUISITION (OUTPATIENT)
Dept: LAB | Facility: CLINIC | Age: 43
End: 2023-07-28

## 2023-07-28 DIAGNOSIS — K85.90 ACUTE PANCREATITIS WITHOUT NECROSIS OR INFECTION, UNSPECIFIED: ICD-10-CM

## 2023-07-28 PROCEDURE — 83690 ASSAY OF LIPASE: CPT | Performed by: FAMILY MEDICINE

## 2023-07-29 LAB — LIPASE SERPL-CCNC: 30 U/L (ref 13–60)

## 2023-10-08 ENCOUNTER — HEALTH MAINTENANCE LETTER (OUTPATIENT)
Age: 43
End: 2023-10-08

## 2023-12-07 ENCOUNTER — NURSE TRIAGE (OUTPATIENT)
Dept: NURSING | Facility: CLINIC | Age: 43
End: 2023-12-07
Payer: COMMERCIAL

## 2023-12-07 RX ORDER — VENLAFAXINE 37.5 MG/1
37.5 TABLET ORAL DAILY
COMMUNITY

## 2023-12-07 RX ORDER — ESZOPICLONE 2 MG/1
2 TABLET, FILM COATED ORAL AT BEDTIME
COMMUNITY

## 2023-12-07 RX ORDER — VENLAFAXINE HYDROCHLORIDE 75 MG/1
75 CAPSULE, EXTENDED RELEASE ORAL DAILY
COMMUNITY

## 2023-12-07 NOTE — TELEPHONE ENCOUNTER
Reason for Disposition   Health information question, no triage required and triager able to answer question    Protocols used: Information Only Call - No Triage-A-OH  Nurse Triage SBAR    Is this a 2nd Level Triage? NO    Situation: Patient calling with questions about scheduled surgery.  Consent: not needed    Background: Calling to address flu like symptoms and if they will affect scheduled surgery. No interesting in triaging symptoms.      Assessment: No triage completed.    Protocol Recommended Disposition:   Home Care    Recommendation:  Connected patient with surgery center that her procedure is scheduled at to further address her questions.        Does the patient meet one of the following criteria for ADS visit consideration? No         Viviana Jolly RN [facility] 12/7/2023 1:05 PM

## 2023-12-10 ENCOUNTER — ANESTHESIA EVENT (OUTPATIENT)
Dept: SURGERY | Facility: AMBULATORY SURGERY CENTER | Age: 43
End: 2023-12-10
Payer: COMMERCIAL

## 2023-12-11 ENCOUNTER — ANESTHESIA (OUTPATIENT)
Dept: SURGERY | Facility: AMBULATORY SURGERY CENTER | Age: 43
End: 2023-12-11
Payer: COMMERCIAL

## 2023-12-11 ENCOUNTER — HOSPITAL ENCOUNTER (OUTPATIENT)
Facility: AMBULATORY SURGERY CENTER | Age: 43
Discharge: HOME | End: 2023-12-11
Attending: OBSTETRICS & GYNECOLOGY
Payer: COMMERCIAL

## 2023-12-11 VITALS
HEART RATE: 70 BPM | BODY MASS INDEX: 29.02 KG/M2 | HEIGHT: 64 IN | WEIGHT: 170 LBS | DIASTOLIC BLOOD PRESSURE: 70 MMHG | OXYGEN SATURATION: 99 % | RESPIRATION RATE: 16 BRPM | TEMPERATURE: 97.6 F | SYSTOLIC BLOOD PRESSURE: 120 MMHG

## 2023-12-11 DIAGNOSIS — Z98.890 S/P LAPAROSCOPIC PROCEDURE: Primary | ICD-10-CM

## 2023-12-11 DIAGNOSIS — R10.9 ABDOMINAL PAIN: ICD-10-CM

## 2023-12-11 LAB
HCG UR QL: NEGATIVE
HEMOGLOBIN: 13.5 G/DL
INTERNAL QC OK POCT: NORMAL
POCT KIT EXPIRATION DATE: NORMAL
POCT KIT LOT NUMBER: NORMAL

## 2023-12-11 RX ORDER — ONDANSETRON 2 MG/ML
4 INJECTION INTRAMUSCULAR; INTRAVENOUS EVERY 30 MIN PRN
Status: DISCONTINUED | OUTPATIENT
Start: 2023-12-11 | End: 2023-12-13 | Stop reason: HOSPADM

## 2023-12-11 RX ORDER — ACETAMINOPHEN 325 MG/1
975 TABLET ORAL ONCE
Status: COMPLETED | OUTPATIENT
Start: 2023-12-11 | End: 2023-12-11

## 2023-12-11 RX ORDER — LIDOCAINE HYDROCHLORIDE 20 MG/ML
INJECTION, SOLUTION INFILTRATION; PERINEURAL PRN
Status: DISCONTINUED | OUTPATIENT
Start: 2023-12-11 | End: 2023-12-11

## 2023-12-11 RX ORDER — FENTANYL CITRATE 50 UG/ML
INJECTION, SOLUTION INTRAMUSCULAR; INTRAVENOUS PRN
Status: DISCONTINUED | OUTPATIENT
Start: 2023-12-11 | End: 2023-12-11

## 2023-12-11 RX ORDER — NEOSTIGMINE METHYLSULFATE 1 MG/ML
VIAL (ML) INJECTION PRN
Status: DISCONTINUED | OUTPATIENT
Start: 2023-12-11 | End: 2023-12-11

## 2023-12-11 RX ORDER — ONDANSETRON 4 MG/1
4 TABLET, ORALLY DISINTEGRATING ORAL EVERY 30 MIN PRN
Status: DISCONTINUED | OUTPATIENT
Start: 2023-12-11 | End: 2023-12-13 | Stop reason: HOSPADM

## 2023-12-11 RX ORDER — DEXAMETHASONE SODIUM PHOSPHATE 4 MG/ML
INJECTION, SOLUTION INTRA-ARTICULAR; INTRALESIONAL; INTRAMUSCULAR; INTRAVENOUS; SOFT TISSUE PRN
Status: DISCONTINUED | OUTPATIENT
Start: 2023-12-11 | End: 2023-12-11

## 2023-12-11 RX ORDER — AMOXICILLIN 250 MG
1-2 CAPSULE ORAL 2 TIMES DAILY
Qty: 30 TABLET | Refills: 0 | Status: SHIPPED | OUTPATIENT
Start: 2023-12-11 | End: 2024-03-05

## 2023-12-11 RX ORDER — IBUPROFEN 800 MG/1
800 TABLET, FILM COATED ORAL EVERY 6 HOURS PRN
Qty: 30 TABLET | Refills: 0 | Status: SHIPPED | OUTPATIENT
Start: 2023-12-11 | End: 2024-03-05

## 2023-12-11 RX ORDER — MAGNESIUM SULFATE HEPTAHYDRATE 40 MG/ML
4 INJECTION, SOLUTION INTRAVENOUS ONCE
Status: COMPLETED | OUTPATIENT
Start: 2023-12-11 | End: 2023-12-11

## 2023-12-11 RX ORDER — ONDANSETRON 2 MG/ML
INJECTION INTRAMUSCULAR; INTRAVENOUS PRN
Status: DISCONTINUED | OUTPATIENT
Start: 2023-12-11 | End: 2023-12-11

## 2023-12-11 RX ORDER — KETOROLAC TROMETHAMINE 30 MG/ML
30 INJECTION, SOLUTION INTRAMUSCULAR; INTRAVENOUS ONCE
Status: COMPLETED | OUTPATIENT
Start: 2023-12-11 | End: 2023-12-11

## 2023-12-11 RX ORDER — IBUPROFEN 800 MG/1
800 TABLET, FILM COATED ORAL ONCE
Status: DISCONTINUED | OUTPATIENT
Start: 2023-12-11 | End: 2023-12-13 | Stop reason: HOSPADM

## 2023-12-11 RX ORDER — OXYCODONE HCL 10 MG/1
10 TABLET, FILM COATED, EXTENDED RELEASE ORAL EVERY 12 HOURS
Status: CANCELLED | OUTPATIENT
Start: 2023-12-11

## 2023-12-11 RX ORDER — OXYCODONE HYDROCHLORIDE 5 MG/1
5 TABLET ORAL
Status: DISCONTINUED | OUTPATIENT
Start: 2023-12-11 | End: 2023-12-13 | Stop reason: HOSPADM

## 2023-12-11 RX ORDER — LABETALOL 20 MG/4 ML (5 MG/ML) INTRAVENOUS SYRINGE
10
Status: DISCONTINUED | OUTPATIENT
Start: 2023-12-11 | End: 2023-12-13 | Stop reason: HOSPADM

## 2023-12-11 RX ORDER — PROPOFOL 10 MG/ML
INJECTION, EMULSION INTRAVENOUS CONTINUOUS PRN
Status: DISCONTINUED | OUTPATIENT
Start: 2023-12-11 | End: 2023-12-11

## 2023-12-11 RX ORDER — PROPOFOL 10 MG/ML
INJECTION, EMULSION INTRAVENOUS PRN
Status: DISCONTINUED | OUTPATIENT
Start: 2023-12-11 | End: 2023-12-11

## 2023-12-11 RX ORDER — LIDOCAINE 40 MG/G
CREAM TOPICAL
Status: DISCONTINUED | OUTPATIENT
Start: 2023-12-11 | End: 2023-12-13 | Stop reason: HOSPADM

## 2023-12-11 RX ORDER — ONDANSETRON 4 MG/1
4 TABLET, ORALLY DISINTEGRATING ORAL EVERY 8 HOURS PRN
Qty: 4 TABLET | Refills: 0 | Status: SHIPPED | OUTPATIENT
Start: 2023-12-11 | End: 2024-03-05

## 2023-12-11 RX ORDER — OXYCODONE HYDROCHLORIDE 5 MG/1
5-10 TABLET ORAL EVERY 4 HOURS PRN
Qty: 10 TABLET | Refills: 0 | Status: SHIPPED | OUTPATIENT
Start: 2023-12-11 | End: 2024-03-05

## 2023-12-11 RX ORDER — HYDROMORPHONE HCL IN WATER/PF 6 MG/30 ML
0.4 PATIENT CONTROLLED ANALGESIA SYRINGE INTRAVENOUS EVERY 5 MIN PRN
Status: DISCONTINUED | OUTPATIENT
Start: 2023-12-11 | End: 2023-12-13 | Stop reason: HOSPADM

## 2023-12-11 RX ORDER — FENTANYL CITRATE 0.05 MG/ML
50 INJECTION, SOLUTION INTRAMUSCULAR; INTRAVENOUS EVERY 5 MIN PRN
Status: DISCONTINUED | OUTPATIENT
Start: 2023-12-11 | End: 2023-12-13 | Stop reason: HOSPADM

## 2023-12-11 RX ORDER — CLINDAMYCIN PHOSPHATE 900 MG/50ML
900 INJECTION, SOLUTION INTRAVENOUS
Status: COMPLETED | OUTPATIENT
Start: 2023-12-11 | End: 2023-12-11

## 2023-12-11 RX ORDER — ACETAMINOPHEN 325 MG/1
975 TABLET ORAL ONCE
Status: DISCONTINUED | OUTPATIENT
Start: 2023-12-11 | End: 2023-12-13 | Stop reason: HOSPADM

## 2023-12-11 RX ORDER — GLYCOPYRROLATE 0.2 MG/ML
INJECTION, SOLUTION INTRAMUSCULAR; INTRAVENOUS PRN
Status: DISCONTINUED | OUTPATIENT
Start: 2023-12-11 | End: 2023-12-11

## 2023-12-11 RX ORDER — CLINDAMYCIN PHOSPHATE 900 MG/50ML
900 INJECTION, SOLUTION INTRAVENOUS SEE ADMIN INSTRUCTIONS
Status: DISCONTINUED | OUTPATIENT
Start: 2023-12-11 | End: 2023-12-13 | Stop reason: HOSPADM

## 2023-12-11 RX ORDER — FENTANYL CITRATE 0.05 MG/ML
25 INJECTION, SOLUTION INTRAMUSCULAR; INTRAVENOUS EVERY 5 MIN PRN
Status: DISCONTINUED | OUTPATIENT
Start: 2023-12-11 | End: 2023-12-13 | Stop reason: HOSPADM

## 2023-12-11 RX ORDER — BUPIVACAINE HYDROCHLORIDE 2.5 MG/ML
INJECTION, SOLUTION INFILTRATION; PERINEURAL PRN
Status: DISCONTINUED | OUTPATIENT
Start: 2023-12-11 | End: 2023-12-11 | Stop reason: HOSPADM

## 2023-12-11 RX ORDER — SODIUM CHLORIDE, SODIUM LACTATE, POTASSIUM CHLORIDE, CALCIUM CHLORIDE 600; 310; 30; 20 MG/100ML; MG/100ML; MG/100ML; MG/100ML
INJECTION, SOLUTION INTRAVENOUS CONTINUOUS
Status: DISCONTINUED | OUTPATIENT
Start: 2023-12-11 | End: 2023-12-13 | Stop reason: HOSPADM

## 2023-12-11 RX ORDER — HYDROMORPHONE HCL IN WATER/PF 6 MG/30 ML
0.2 PATIENT CONTROLLED ANALGESIA SYRINGE INTRAVENOUS EVERY 5 MIN PRN
Status: DISCONTINUED | OUTPATIENT
Start: 2023-12-11 | End: 2023-12-13 | Stop reason: HOSPADM

## 2023-12-11 RX ORDER — OXYCODONE HYDROCHLORIDE 5 MG/1
5 TABLET ORAL
Status: COMPLETED | OUTPATIENT
Start: 2023-12-11 | End: 2023-12-11

## 2023-12-11 RX ADMIN — PROPOFOL 50 MG: 10 INJECTION, EMULSION INTRAVENOUS at 14:27

## 2023-12-11 RX ADMIN — KETOROLAC TROMETHAMINE 15 MG: 30 INJECTION, SOLUTION INTRAMUSCULAR; INTRAVENOUS at 14:58

## 2023-12-11 RX ADMIN — FENTANYL CITRATE 50 MCG: 50 INJECTION, SOLUTION INTRAMUSCULAR; INTRAVENOUS at 15:10

## 2023-12-11 RX ADMIN — OXYCODONE HYDROCHLORIDE 5 MG: 5 TABLET ORAL at 17:08

## 2023-12-11 RX ADMIN — PROPOFOL 160 MG: 10 INJECTION, EMULSION INTRAVENOUS at 14:07

## 2023-12-11 RX ADMIN — MAGNESIUM SULFATE HEPTAHYDRATE 4 G: 40 INJECTION, SOLUTION INTRAVENOUS at 13:38

## 2023-12-11 RX ADMIN — Medication 0.2 MG: at 15:35

## 2023-12-11 RX ADMIN — DEXAMETHASONE SODIUM PHOSPHATE 10 MG: 4 INJECTION, SOLUTION INTRA-ARTICULAR; INTRALESIONAL; INTRAMUSCULAR; INTRAVENOUS; SOFT TISSUE at 14:19

## 2023-12-11 RX ADMIN — FENTANYL CITRATE 50 MCG: 50 INJECTION, SOLUTION INTRAMUSCULAR; INTRAVENOUS at 14:07

## 2023-12-11 RX ADMIN — Medication 3.5 MG: at 14:58

## 2023-12-11 RX ADMIN — Medication 10 MG: at 14:29

## 2023-12-11 RX ADMIN — Medication 10 MG: at 14:44

## 2023-12-11 RX ADMIN — SODIUM CHLORIDE, SODIUM LACTATE, POTASSIUM CHLORIDE, CALCIUM CHLORIDE: 600; 310; 30; 20 INJECTION, SOLUTION INTRAVENOUS at 13:23

## 2023-12-11 RX ADMIN — ONDANSETRON 4 MG: 2 INJECTION INTRAMUSCULAR; INTRAVENOUS at 16:07

## 2023-12-11 RX ADMIN — GLYCOPYRROLATE 0.6 MG: 0.2 INJECTION, SOLUTION INTRAMUSCULAR; INTRAVENOUS at 14:58

## 2023-12-11 RX ADMIN — FENTANYL CITRATE 50 MCG: 50 INJECTION, SOLUTION INTRAMUSCULAR; INTRAVENOUS at 14:27

## 2023-12-11 RX ADMIN — SODIUM CHLORIDE, SODIUM LACTATE, POTASSIUM CHLORIDE, CALCIUM CHLORIDE: 600; 310; 30; 20 INJECTION, SOLUTION INTRAVENOUS at 15:06

## 2023-12-11 RX ADMIN — CLINDAMYCIN PHOSPHATE 900 MG: 900 INJECTION, SOLUTION INTRAVENOUS at 13:36

## 2023-12-11 RX ADMIN — PROPOFOL 180 MCG/KG/MIN: 10 INJECTION, EMULSION INTRAVENOUS at 14:07

## 2023-12-11 RX ADMIN — ACETAMINOPHEN 975 MG: 325 TABLET ORAL at 13:28

## 2023-12-11 RX ADMIN — SODIUM CHLORIDE, SODIUM LACTATE, POTASSIUM CHLORIDE, CALCIUM CHLORIDE: 600; 310; 30; 20 INJECTION, SOLUTION INTRAVENOUS at 15:11

## 2023-12-11 RX ADMIN — Medication 30 MG: at 14:07

## 2023-12-11 RX ADMIN — Medication 0.2 MG: at 15:20

## 2023-12-11 RX ADMIN — FENTANYL CITRATE 50 MCG: 50 INJECTION, SOLUTION INTRAMUSCULAR; INTRAVENOUS at 15:03

## 2023-12-11 RX ADMIN — LIDOCAINE HYDROCHLORIDE 3 ML: 20 INJECTION, SOLUTION INFILTRATION; PERINEURAL at 14:07

## 2023-12-11 RX ADMIN — ONDANSETRON 4 MG: 2 INJECTION INTRAMUSCULAR; INTRAVENOUS at 14:58

## 2023-12-11 NOTE — ANESTHESIA CARE TRANSFER NOTE
Patient: Anisa Love    Procedure: Procedure(s):  HYSTEROSCOPY with DILATION AND CURETTAGE, DIAGNOSTIC LAPAROSCOPY, BILATERAL LAPAROSCOPIC SALPINGECTOMY       Diagnosis: Abdominal pain [R10.9]  Diagnosis Additional Information: No value filed.    Anesthesia Type:   General     Note:    Oropharynx: oropharynx clear of all foreign objects and spontaneously breathing  Level of Consciousness: drowsy  Oxygen Supplementation: face mask  Level of Supplemental Oxygen (L/min / FiO2): 6  Independent Airway: airway patency satisfactory and stable  Dentition: dentition unchanged  Vital Signs Stable: post-procedure vital signs reviewed and stable  Report to RN Given: handoff report given  Patient transferred to: PACU    Handoff Report: Identifed the Patient, Identified the Reponsible Provider, Reviewed the pertinent medical history, Discussed the surgical course, Reviewed Intra-OP anesthesia mangement and issues during anesthesia, Set expectations for post-procedure period and Allowed opportunity for questions and acknowledgement of understanding      Vitals:  Vitals Value Taken Time   /76 12/11/23 1508   Temp 98.1  F (36.7  C) 12/11/23 1507   Pulse 93 12/11/23 1509   Resp 20 12/11/23 1507   SpO2 98 % 12/11/23 1509   Vitals shown include unfiled device data.    Electronically Signed By: HERNÁN Hodges CRNA  December 11, 2023  3:11 PM

## 2023-12-11 NOTE — OP NOTE
PROCEDURE NOTE - LAPAROSCOPY, BILATERAL SALPINGECTOMY, HYSTEROSCOPY AND DILATION AND CURETTAGE     NAME: Anisa Love   :  1980   MRN: [id number]      DATE OF SERVICE: 2023     PREOPERATIVE DIAGNOSIS:   pelvic pain  Irregular vaginal bleeding  Desires permanent sterilization    POSTOPERATIVE DIAGNOSIS:   same    PROCEDURES: diagnostic laparoscopy, bilateral salpingectomy, hysteroscopy, dilation and curettage    SURGEON: Anitha Archuleta DO     ASSISTANT:   OR Staff    ANESTHESIA: general     ESTIMATED BLOOD LOSS: 10ml    HISTORY OF PRESENT ILLNESS:   Anisa Love is a 43 year old female with history of pelvic pain and endometriosis.  She also reports irregular vaginal bleeding.    CONSENT:  She was met preoperatively, where we discussed the procedure and the risks associated with the procedure. She understood these to be, but not limited to injury to adjacent organs including bladder, bowel, ureter, infection and bleeding. Patient signed consent.    PROCEDURE  Patient was brought back to the operating room in stable condition. Patient underwent induction of a general anesthetic, she was carefully prepped and draped in sterile fashion in the dorsal lithotomy position. Timeout was performed. Bladder was drained with a Cheatham catheter, uterine manipulator placed into the uterus.     Attention was turned to the abdomen. An incision below the umbilicus. A Veress needle was introduced with a 2 pop technique, saline drop test confirmed adequate placement. Opening pressure was 8mmHg. Insufflation was done until 15mm of pressure were established. A 5 nonbladed trocar was placed below the umbilicus. Two more port sights were place in the right and left lower quadrants under direct visualization. Trandelenburg assistance was then used.     The procedure began with identifying the anatomy. The uterus was visualized was found to be normal appearing, tubes appeared normal, ovaries were normal in appearance. The  left upper quadrant was free of endometriosis and free of adhesions. There were minimal endometriosis deposits in the cul-de-sac.    The right fallopian tube was identified and followed up to the fimbriated end.  The 5 mm LigaSure was then used to resect the fallopian tube in its entirety.  The tube was then removed from the right lower quadrant port.  There was good hemostasis noted at the resection site.  A similar procedure was performed on the left and again hemostasis was noted.    The trocars were then removed and the gas was removed from the abdomen. Skin was closed with 4-0 Monocryl suture. Steri-Strips applied. Procedure was then turned to the lower field. A bimanual exam was done, demonstrating a normal and mobile uterus. The anterior lip of the cervix was regrasped with single tooth tenaculum. Uterus was then sounded to 8cm. The cervix was gently dilated using Haleigh dilators and the hysteroscope was introduced. Cavity of the uterus was noted to be smooth in contour and normal in appearance. At this point endometrial curettings were obtained. In each case all quadrants were sampled, and the tissue was submitted for pathologic exam. At this point good hemostasis was noted with this portion of the procedure. Instruments were removed from vagina. No active bleeding was noted. Sponge and needle counts were correct X 2. She was taken to recovery in stable condition.    SPECIMENS SENT: bilateral fallopian tubes; endometrial curettings      Anitha Archuleta DO      CC: Tiffani Cassidy, Anitha Archuleta,

## 2023-12-11 NOTE — ANESTHESIA POSTPROCEDURE EVALUATION
Patient: Anisa Love    Procedure: Procedure(s):  HYSTEROSCOPY with DILATION AND CURETTAGE, DIAGNOSTIC LAPAROSCOPY, BILATERAL LAPAROSCOPIC SALPINGECTOMY       Anesthesia Type:  General    Note:  Disposition: Outpatient   Postop Pain Control: Uneventful            Sign Out: Well controlled pain   PONV: No   Neuro/Psych: Uneventful            Sign Out: Acceptable/Baseline neuro status   Airway/Respiratory: Uneventful            Sign Out: Acceptable/Baseline resp. status   CV/Hemodynamics: Uneventful            Sign Out: Acceptable CV status; No obvious hypovolemia; No obvious fluid overload   Other NRE: NONE   DID A NON-ROUTINE EVENT OCCUR? No           Last vitals:  Vitals Value Taken Time   /60 12/11/23 1530   Temp 98.1  F (36.7  C) 12/11/23 1507   Pulse 79 12/11/23 1539   Resp 20 12/11/23 1507   SpO2 99 % 12/11/23 1539   Vitals shown include unfiled device data.    Electronically Signed By: Ivan Jose MD  December 11, 2023  4:04 PM

## 2023-12-11 NOTE — ANESTHESIA PREPROCEDURE EVALUATION
Anesthesia Pre-Procedure Evaluation    Patient: Anisa Love   MRN: [id number] : 1980        Procedure : Procedure(s):  HYSTEROSCOPY, WITH ENDOMETRIAL ABLATION, DIAGNOSTIC LAPAROSCOPY, BILATERAL LAPAROSCOPIC SALPINGECTOMY          Past Medical History:   Diagnosis Date    Depression     GERD (gastroesophageal reflux disease)     Gonorrhea     HPV (human papilloma virus) anogenital infection     Hypertension     Mental disorder     Motion sickness     Noninfectious ileitis     PONV (postoperative nausea and vomiting)     Sleep apnea     Slight does not use a CPAP    TMJ (temporomandibular joint syndrome)     Pt is able to put 3 fingers stacked in mouth    Ulcerative colitis (H)       Past Surgical History:   Procedure Laterality Date     SECTION N/A 2019    Procedure:  SECTION;  Surgeon: Magi Ayala MD;  Location: [facility] OR;  Service: Obstetrics    LAPAROSCOPIC CHOLECYSTECTOMY  2019    LAPAROSCOPY DIAGNOSTIC (GYN) N/A 8/3/2022    Procedure: DIAGNOSTIC LAPAROSCOPY, excision of endometrial cysts and cauterizaton of endometrial cysts;  Surgeon: Minerva York MD;  [facility]    WISDOM TOOTH EXTRACTION        No Known Allergies   Social History     Tobacco Use    Smoking status: Some Days     Packs/day: .2     Types: Cigarettes    Smokeless tobacco: Never   Substance Use Topics    Alcohol use: Yes     Comment: Occasional      Wt Readings from Last 1 Encounters:   23 77.1 kg (170 lb)        Anesthesia Evaluation            ROS/MED HX  ENT/Pulmonary:     (+) sleep apnea,                                      Neurologic:       Cardiovascular:     (+)  hypertension- -   -  - -                                   (-) murmur   METS/Exercise Tolerance:     Hematologic:       Musculoskeletal:       GI/Hepatic:     (+) GERD,      Inflammatory bowel disease,             Renal/Genitourinary:       Endo:       Psychiatric/Substance Use:     (+)  psychiatric history depression       Infectious Disease:       Malignancy:       Other:            Physical Exam    Airway  airway exam normal      Mallampati: II   TM distance: > 3 FB   Neck ROM: full   Mouth opening: > 3 cm    Respiratory Devices and Support         Dental       (+) Completely normal teeth      Cardiovascular   cardiovascular exam normal       Rhythm and rate: regular and normal (-) no murmur    Pulmonary   pulmonary exam normal        breath sounds clear to auscultation           OUTSIDE LABS:  CBC:   Lab Results   Component Value Date    WBC 6.7 07/06/2023    WBC 12.2 (H) 10/06/2022    HGB 15.1 07/06/2023    HGB 14.7 10/06/2022    HCT 45.9 07/06/2023    HCT 43.2 10/06/2022     07/06/2023     10/06/2022     BMP:   Lab Results   Component Value Date     07/06/2023     (L) 10/06/2022    POTASSIUM 4.3 07/06/2023    POTASSIUM 4.0 10/06/2022    CHLORIDE 101 07/06/2023    CHLORIDE 98 10/06/2022    CO2 26 07/06/2023    CO2 26 10/06/2022    BUN 9.9 07/06/2023    BUN 11.3 10/06/2022    CR 0.79 07/06/2023    CR 0.72 10/06/2022     (H) 07/06/2023     (H) 10/06/2022     COAGS:   Lab Results   Component Value Date    PTT 26 12/30/2019    INR 0.97 12/30/2019     POC:   Lab Results   Component Value Date    HCG Negative 12/11/2023    HCGS Negative 03/14/2022     HEPATIC:   Lab Results   Component Value Date    ALBUMIN 4.8 07/06/2023    PROTTOTAL 7.2 07/06/2023    ALT 19 07/06/2023    AST 20 07/06/2023    ALKPHOS 38 07/06/2023    BILITOTAL 0.7 07/06/2023     OTHER:   Lab Results   Component Value Date    CLAUDIA 9.7 07/06/2023    MAG 5.9 (HH) 12/31/2019    LIPASE 30 07/28/2023    TSH 1.65 05/08/2020       Anesthesia Plan    ASA Status:  2    NPO Status:  NPO Appropriate    Anesthesia Type: General.     - Airway: ETT   Induction: Intravenous.   Maintenance: TIVA.        Consents    Anesthesia Plan(s) and associated risks, benefits, and realistic alternatives discussed.  "Questions answered and patient/representative(s) expressed understanding.     - Discussed: Risks, Benefits and Alternatives for BOTH SEDATION and the PROCEDURE were discussed     - Discussed with:  Patient            Postoperative Care    Pain management: IV analgesics, Oral pain medications.   PONV prophylaxis: Ondansetron (or other 5HT-3), Dexamethasone or Solumedrol, Background Propofol Infusion     Comments:    Other Comments: Patient reportedly drank 50 ounces of diet coke at 9:45am. Clear liquid criteria met, but bedside gastric point of care US performed given volume of ingestion and no significant gastric contents noted.           Ivan Jose MD    I have reviewed the pertinent notes and labs in the chart from the past 30 days and (re)examined the patient.  Any updates or changes from those notes are reflected in this note.              # Overweight: Estimated body mass index is 29.18 kg/m  as calculated from the following:    Height as of this encounter: 1.626 m (5' 4\").    Weight as of this encounter: 77.1 kg (170 lb).      "

## 2023-12-11 NOTE — ANESTHESIA PROCEDURE NOTES
Airway       Patient location during procedure: OR       Procedure Start/Stop Times: 12/11/2023 2:10 PM  Staff -        CRNA: Sabi Lang APRN CRNA       Performed By: CRNAIndications and Patient Condition       Indications for airway management: patricio-procedural       Induction type:intravenous       Mask difficulty assessment: 1 - vent by mask    Final Airway Details       Final airway type: endotracheal airway       Successful airway: ETT - single and Oral  Endotracheal Airway Details        ETT size (mm): 7.0       Cuffed: yes       Successful intubation technique: direct laryngoscopy       DL Blade Type: Ryan 2       Grade View of Cords: 1       Adjucts: stylet       Position: Right       Measured from: gums/teeth       Secured at (cm): 22       Bite block used: None    Post intubation assessment        Placement verified by: capnometry, equal breath sounds and chest rise        Number of attempts at approach: 1       Secured with: tape       Ease of procedure: easy       Dentition: Intact and Unchanged       Dental guard used and removed.    Medication(s) Administered   Medication Administration Time: 12/11/2023 2:10 PM

## 2023-12-11 NOTE — DISCHARGE INSTRUCTIONS
You have received 975 mg of Acetaminophen (Tylenol) at 1:30PM. Please do not take an additional dose of Tylenol until after 7:30PM     Do not exceed 4,000 mg of acetaminophen during a 24 hour period and keep in mind that acetaminophen can also be found in many over-the-counter cold medications as well as narcotics that may be given for pain.     You received a dose of IV Toradol at 2:00PM. Please do not take any additional Ibuprofen/NSAID products (Aleve/Advil/Motrin/Naproxen/Celebrex) until after 8:00PM.      If you have any questions or concerns regarding your procedure, please contact Dr. Archuleta, her office number is [phone].     Bandaids can come off in 24 hours. You are able to let water run over your incisions- please pat them dry. The steri strips are the the white strips covering your incisions, these act like artificial scabs and will fall off on their own in 7-10 days.      Hysteroscopy: What to Expect at Home  Your Recovery     A hysteroscopy is a procedure to find and treat problems with your uterus. It may have been done to remove growths from the uterus. Or it may have been done to diagnose or treat fertility problems. It can also be used to diagnose or treat abnormal bleeding.  You may have cramps, discharge, or light vaginal bleeding for several days after the test. This may last longer if the hysteroscopy was used for treatment. If the doctor filled your uterus with air, your belly may feel full. You may also have shoulder pain right after the procedure.  This care sheet gives you a general idea about how long it will take for you to recover. But each person recovers at a different pace. Follow the steps below to get better as quickly as possible.  How can you care for yourself at home?  Activity    Rest when you feel tired.     You can do your normal activities when it feels okay to do so.     You may shower and take baths as usual.     Ask your doctor when it is okay for you to have sex.    Diet    You can eat your normal diet. If your stomach is upset, try bland, low-fat foods like plain rice, broiled chicken, toast, and yogurt.     If your bowel movements are not regular right after surgery, try to avoid constipation and straining. Drink plenty of water. Your doctor may suggest fiber, a stool softener, or a mild laxative.   Medicines    Your doctor will tell you if and when you can restart your medicines. You will also be given instructions about taking any new medicines.     If you take aspirin or some other blood thinner, be sure to talk to your doctor. Your doctor will tell you if and when to start taking this medicine again. Make sure that you understand exactly what your doctor wants you to do.     Be safe with medicines. Take pain medicines exactly as directed.  If the doctor gave you a prescription medicine for pain, take it as prescribed.  If you are not taking a prescription pain medicine, ask your doctor if you can take an over-the-counter medicine.  Do not take two or more pain medicines at the same time unless the doctor told you to. Many pain medicines have acetaminophen, which is Tylenol. Too much acetaminophen (Tylenol) can be harmful.     If your doctor prescribed antibiotics, take them as directed. Do not stop taking them just because you feel better. You need to take the full course of antibiotics.   Other instructions    You may have some light vaginal bleeding. Wear sanitary pads if needed. Do not use tampons until your doctor says it is okay.     You may want to use a heating pad on your belly to help with pain. Use a low heat setting.     Talk to your doctor if you want to try to get pregnant soon. They can tell you when it's safe to do so. If you don't want to get pregnant, talk with your doctor about birth control.   Follow-up care is a key part of your treatment and safety. Be sure to make and go to all appointments, and call your doctor if you are having problems. It's  also a good idea to know your test results and keep a list of the medicines you take.  When should you call for help?   Call 911 anytime you think you may need emergency care. For example, call if:    You passed out (lost consciousness).     You have chest pain, are short of breath, or cough up blood.   Call your doctor now or seek immediate medical care if:    You have pain that does not get better after you take pain medicine.     You cannot pass stools or gas.     You have vaginal discharge that has increased in amount or smells bad.     You are sick to your stomach or cannot drink fluids.     You have signs of infection, such as:  Increased pain, swelling, warmth, or redness.  A fever.     You have bright red vaginal bleeding that soaks one or more pads in an hour, or you have large clots.     You have signs of a blood clot in your leg (called a deep vein thrombosis), such as:  Pain in your calf, back of the knee, thigh, or groin.  Redness and swelling in your leg.   Watch closely for changes in your health, and be sure to contact your doctor if you have any problems.  Current as of: April 19, 2023               Content Version: 13.8    3280-7655 [employer].   Care instructions adapted under license by your healthcare professional. If you have questions about a medical condition or this instruction, always ask your healthcare professional. [employer] disclaims any warranty or liability for your use of this information.  Learning About Salpingectomy  What is a salpingectomy?     Salpingectomy is surgery to remove the fallopian tubes. When one or part of a tube is removed, it is called a partial salpingectomy.  The tubes are in the lower belly. They lead up from each upper side of the uterus. They end near the ovaries. When an egg is released by an ovary, it travels down a fallopian tube toward the uterus.  The tubes may be removed at the same time as surgery to remove the uterus or  ovaries.  Why is it done?  You may have surgery to remove your fallopian tubes for several reasons.  Sterilization. The surgery blocks sperm from reaching an egg. This means you cannot get pregnant.  Ectopic pregnancy. The surgery may be used to remove a fertilized egg that grows in a fallopian tube. Or it may be used if the tube may rupture or is very damaged.  Cancer. If you are at risk of cancer in the ovaries, fallopian tubes, or tissues that line the inside of the belly, the surgery is used to lower that risk. It's also used to take out tubes that already have cancer.  A blocked or damaged fallopian tube.  Fertility problems. When a fallopian tube is blocked and fills with fluid, the fluid may leak into the uterus. This can stop an egg from implanting. The surgery may be used to remove the source of the fluid. It may be done before in vitro fertilization (IVF).  How is it done?  This surgery can be done several ways. Sometimes just the fallopian tubes are removed. But sometimes the uterus, ovaries, or both are also removed. This can change the way your surgery is done.  Laparoscopic surgery. It's done with very small cuts in your belly. The doctor puts a lighted tube, or scope, and other tools through the cuts.  Open surgery. The doctor makes a larger cut in the belly.  Vaginal surgery. A cut is made in the vagina.  Before your surgery starts, you will get medicines to prevent pain and make you sleep.  What can you expect after surgery?  The length of your hospital stay depends on what kind of surgery you had. You will likely go home the day of your surgery. But if your uterus was removed (hysterectomy), your hospital stay may be longer.  Your return to normal activities can take from a few days to a couple of weeks. How long it takes depends on the type of surgery. It also depends on your overall health and the kind of work and other activities you do. For some people, it might take 4 to 6 weeks to fully  recover.  Current as of: April 19, 2023               Content Version: 13.8    0117-5258 [employer].   Care instructions adapted under license by your healthcare professional. If you have questions about a medical condition or this instruction, always ask your healthcare professional. [employer] disclaims any warranty or liability for your use of this information.          [facility] Same-Day [facility]   Adult Discharge Orders & Instructions     For 24 hours after surgery    Get plenty of rest.  A responsible adult must stay with you for at least 24 hours after you leave the hospital.     Do not drive or use heavy equipment.  If you have weakness or tingling, don't drive or use heavy equipment until this feeling goes away.    Do not drink alcohol.    Avoid strenuous or risky activities.  Ask for help when climbing stairs.     You may feel lightheaded.  If so, sit for a few minutes before standing.  Have someone help you get up.     You may have a slight fever. Call the doctor if your fever is over 100 F (37.7 C) (taken under the tongue) or lasts longer than 24 hours.    You may have a dry mouth, a sore throat, muscle aches or trouble sleeping.  These should go away after 24 hours.    Do not make important or legal decisions.    Based on the surgery/procedure that you had today, we do not expect that you will have any problems.  However, we want you to know what to do if you have pain, nausea, bleeding, or infection:    To control pain:  Take medicines your physician has prescribed or or over-the counter medicine he or she advises.  Ice packs and periods of rest are often helpful.  For surgery on an arm or leg, raise it on a pillow to ease swelling.  If your pain is not managed with the above methods, contact your physician.    Copyright [employer], Licensed under [employer]    To control nausea:  Take anti-nausea medicine approved by your physician.  Drink clear  liquids such as apple juice, ginger ale, broth or 7-Up. Be sure to drink enough fluids.  Move to a regular diet as you feel able.  Rest may also help.    Bleeding:  You may see a little blood on your dressing, about the size of a quarter in the first 24 hours.  If you see this, there is no reason to be alarmed.  However, if this continues to increase in size, apply pressure if able, and notify your physician.    Copyright [employer], Licensed under [employer]    Infection: Please contact your physician if you have any of the following signs:  redness, swelling, heat, increasing pain or foul-smelling drainage at your surgery site, fever or chills.    Call your doctor for any of the followin.  It has been over 8 to 10 hours since surgery and you are still not able to urinate (pass water).    2.  Headache for over 24 hours.

## 2023-12-18 ENCOUNTER — HOSPITAL ENCOUNTER (EMERGENCY)
Facility: HOSPITAL | Age: 43
Discharge: HOME | End: 2023-12-18
Attending: EMERGENCY MEDICINE | Admitting: EMERGENCY MEDICINE
Payer: COMMERCIAL

## 2023-12-18 ENCOUNTER — APPOINTMENT (OUTPATIENT)
Dept: CT IMAGING | Facility: HOSPITAL | Age: 43
End: 2023-12-18
Attending: EMERGENCY MEDICINE
Payer: COMMERCIAL

## 2023-12-18 VITALS
RESPIRATION RATE: 14 BRPM | BODY MASS INDEX: 29.02 KG/M2 | OXYGEN SATURATION: 100 % | TEMPERATURE: 97.5 F | HEIGHT: 64 IN | SYSTOLIC BLOOD PRESSURE: 125 MMHG | DIASTOLIC BLOOD PRESSURE: 83 MMHG | HEART RATE: 65 BPM | WEIGHT: 170 LBS

## 2023-12-18 DIAGNOSIS — R10.12 LUQ ABDOMINAL PAIN: ICD-10-CM

## 2023-12-18 PROBLEM — O42.912 PREMATURE RUPTURE OF MEMBRANES IN SECOND TRIMESTER: Status: ACTIVE | Noted: 2017-12-10

## 2023-12-18 PROBLEM — O34.32 PREMATURE CERVICAL DILATION IN SECOND TRIMESTER: Status: ACTIVE | Noted: 2017-12-13

## 2023-12-18 PROBLEM — O47.02 THREATENED PRETERM LABOR, SECOND TRIMESTER: Status: ACTIVE | Noted: 2019-09-05

## 2023-12-18 PROBLEM — G89.29 OTHER CHRONIC PAIN: Status: ACTIVE | Noted: 2023-12-18

## 2023-12-18 PROBLEM — O26.892 VAGINAL DISCHARGE DURING PREGNANCY, ANTEPARTUM, SECOND TRIMESTER: Status: ACTIVE | Noted: 2017-12-09

## 2023-12-18 PROBLEM — I10 BENIGN ESSENTIAL HYPERTENSION: Status: ACTIVE | Noted: 2021-11-22

## 2023-12-18 PROBLEM — N80.9 ENDOMETRIOSIS: Status: ACTIVE | Noted: 2023-12-08

## 2023-12-18 PROBLEM — F41.1 GENERALIZED ANXIETY DISORDER: Status: ACTIVE | Noted: 2021-11-22

## 2023-12-18 PROBLEM — N92.6 IRREGULAR MENSTRUATION, UNSPECIFIED: Status: ACTIVE | Noted: 2023-12-18

## 2023-12-18 PROBLEM — F32.A DEPRESSION: Status: ACTIVE | Noted: 2021-06-08

## 2023-12-18 PROBLEM — M26.609 TMJ (TEMPOROMANDIBULAR JOINT DISORDER): Status: ACTIVE | Noted: 2023-12-08

## 2023-12-18 PROBLEM — O30.042 DICHORIONIC DIAMNIOTIC TWIN PREGNANCY IN SECOND TRIMESTER: Status: ACTIVE | Noted: 2017-12-09

## 2023-12-18 PROBLEM — O16.2 HYPERTENSION AFFECTING PREGNANCY IN SECOND TRIMESTER: Status: ACTIVE | Noted: 2019-09-05

## 2023-12-18 PROBLEM — O14.90 PREECLAMPSIA: Status: ACTIVE | Noted: 2019-12-30

## 2023-12-18 PROBLEM — N89.8 VAGINAL DISCHARGE DURING PREGNANCY, ANTEPARTUM, SECOND TRIMESTER: Status: ACTIVE | Noted: 2017-12-09

## 2023-12-18 PROBLEM — R87.610 ATYPICAL SQUAMOUS CELLS OF UNDETERMINED SIGNIFICANCE (ASCUS) ON PAPANICOLAOU SMEAR OF CERVIX: Status: ACTIVE | Noted: 2021-11-22

## 2023-12-18 PROBLEM — R51.9 HEADACHE DISORDER: Status: ACTIVE | Noted: 2021-11-22

## 2023-12-18 LAB
ALBUMIN SERPL BCG-MCNC: 4.5 G/DL (ref 3.5–5.2)
ALP SERPL-CCNC: 46 U/L (ref 40–150)
ALT SERPL W P-5'-P-CCNC: 106 U/L (ref 0–50)
ANION GAP SERPL CALCULATED.3IONS-SCNC: 10 MMOL/L (ref 7–15)
AST SERPL W P-5'-P-CCNC: 27 U/L (ref 0–45)
BILIRUB DIRECT SERPL-MCNC: <0.2 MG/DL (ref 0–0.3)
BILIRUB SERPL-MCNC: 0.5 MG/DL
BUN SERPL-MCNC: 10.5 MG/DL (ref 6–20)
CALCIUM SERPL-MCNC: 9.2 MG/DL (ref 8.6–10)
CHLORIDE SERPL-SCNC: 102 MMOL/L (ref 98–107)
CREAT SERPL-MCNC: 0.76 MG/DL (ref 0.51–0.95)
DEPRECATED HCO3 PLAS-SCNC: 28 MMOL/L (ref 22–29)
EGFRCR SERPLBLD CKD-EPI 2021: >90 ML/MIN/1.73M2
ERYTHROCYTE [DISTWIDTH] IN BLOOD BY AUTOMATED COUNT: 12.3 % (ref 10–15)
GLUCOSE SERPL-MCNC: 108 MG/DL (ref 70–99)
HCT VFR BLD AUTO: 43.6 % (ref 35–47)
HGB BLD-MCNC: 14.9 G/DL (ref 11.7–15.7)
HOLD SPECIMEN: NORMAL
LIPASE SERPL-CCNC: 37 U/L (ref 13–60)
MCH RBC QN AUTO: 29.4 PG (ref 26.5–33)
MCHC RBC AUTO-ENTMCNC: 34.2 G/DL (ref 31.5–36.5)
MCV RBC AUTO: 86 FL (ref 78–100)
PLATELET # BLD AUTO: 314 10E3/UL (ref 150–450)
POTASSIUM SERPL-SCNC: 4.1 MMOL/L (ref 3.4–5.3)
PROT SERPL-MCNC: 6.8 G/DL (ref 6.4–8.3)
RBC # BLD AUTO: 5.07 10E6/UL (ref 3.8–5.2)
SODIUM SERPL-SCNC: 140 MMOL/L (ref 135–145)
TROPONIN T SERPL HS-MCNC: <6 NG/L
WBC # BLD AUTO: 6.4 10E3/UL (ref 4–11)

## 2023-12-18 PROCEDURE — 93005 ELECTROCARDIOGRAM TRACING: CPT | Performed by: EMERGENCY MEDICINE

## 2023-12-18 PROCEDURE — 85027 COMPLETE CBC AUTOMATED: CPT | Performed by: EMERGENCY MEDICINE

## 2023-12-18 PROCEDURE — 82374 ASSAY BLOOD CARBON DIOXIDE: CPT | Performed by: EMERGENCY MEDICINE

## 2023-12-18 PROCEDURE — 96375 TX/PRO/DX INJ NEW DRUG ADDON: CPT

## 2023-12-18 PROCEDURE — 82248 BILIRUBIN DIRECT: CPT | Performed by: EMERGENCY MEDICINE

## 2023-12-18 PROCEDURE — 250N000011 HC RX IP 250 OP 636: Performed by: EMERGENCY MEDICINE

## 2023-12-18 PROCEDURE — 99285 EMERGENCY DEPT VISIT HI MDM: CPT | Mod: 25

## 2023-12-18 PROCEDURE — 258N000003 HC RX IP 258 OP 636: Performed by: EMERGENCY MEDICINE

## 2023-12-18 PROCEDURE — 96361 HYDRATE IV INFUSION ADD-ON: CPT

## 2023-12-18 PROCEDURE — 36415 COLL VENOUS BLD VENIPUNCTURE: CPT | Performed by: STUDENT IN AN ORGANIZED HEALTH CARE EDUCATION/TRAINING PROGRAM

## 2023-12-18 PROCEDURE — 84484 ASSAY OF TROPONIN QUANT: CPT | Performed by: EMERGENCY MEDICINE

## 2023-12-18 PROCEDURE — 96374 THER/PROPH/DIAG INJ IV PUSH: CPT | Mod: 59

## 2023-12-18 PROCEDURE — 74174 CTA ABD&PLVS W/CONTRAST: CPT

## 2023-12-18 PROCEDURE — 250N000011 HC RX IP 250 OP 636: Mod: JZ | Performed by: EMERGENCY MEDICINE

## 2023-12-18 PROCEDURE — 83690 ASSAY OF LIPASE: CPT | Performed by: EMERGENCY MEDICINE

## 2023-12-18 RX ORDER — OXYCODONE HYDROCHLORIDE 5 MG/1
5 TABLET ORAL EVERY 6 HOURS PRN
Qty: 12 TABLET | Refills: 0 | Status: SHIPPED | OUTPATIENT
Start: 2023-12-18 | End: 2023-12-21

## 2023-12-18 RX ORDER — ONDANSETRON 2 MG/ML
4 INJECTION INTRAMUSCULAR; INTRAVENOUS ONCE
Status: COMPLETED | OUTPATIENT
Start: 2023-12-18 | End: 2023-12-18

## 2023-12-18 RX ORDER — HYDROMORPHONE HYDROCHLORIDE 1 MG/ML
0.5 INJECTION, SOLUTION INTRAMUSCULAR; INTRAVENOUS; SUBCUTANEOUS ONCE
Status: COMPLETED | OUTPATIENT
Start: 2023-12-18 | End: 2023-12-18

## 2023-12-18 RX ORDER — KETOROLAC TROMETHAMINE 15 MG/ML
15 INJECTION, SOLUTION INTRAMUSCULAR; INTRAVENOUS ONCE
Status: COMPLETED | OUTPATIENT
Start: 2023-12-18 | End: 2023-12-18

## 2023-12-18 RX ORDER — IOPAMIDOL 755 MG/ML
90 INJECTION, SOLUTION INTRAVASCULAR ONCE
Status: COMPLETED | OUTPATIENT
Start: 2023-12-18 | End: 2023-12-18

## 2023-12-18 RX ORDER — FAMOTIDINE 20 MG/1
40 TABLET, FILM COATED ORAL DAILY
Qty: 28 TABLET | Refills: 0 | Status: SHIPPED | OUTPATIENT
Start: 2023-12-18 | End: 2024-01-01

## 2023-12-18 RX ADMIN — ONDANSETRON 4 MG: 2 INJECTION INTRAMUSCULAR; INTRAVENOUS at 07:14

## 2023-12-18 RX ADMIN — IOPAMIDOL 90 ML: 755 INJECTION, SOLUTION INTRAVENOUS at 08:20

## 2023-12-18 RX ADMIN — HYDROMORPHONE HYDROCHLORIDE 0.5 MG: 1 INJECTION, SOLUTION INTRAMUSCULAR; INTRAVENOUS; SUBCUTANEOUS at 07:54

## 2023-12-18 RX ADMIN — FAMOTIDINE 20 MG: 10 INJECTION, SOLUTION INTRAVENOUS at 07:16

## 2023-12-18 RX ADMIN — KETOROLAC TROMETHAMINE 15 MG: 15 INJECTION, SOLUTION INTRAMUSCULAR; INTRAVENOUS at 07:10

## 2023-12-18 RX ADMIN — SODIUM CHLORIDE 1000 ML: 9 INJECTION, SOLUTION INTRAVENOUS at 07:10

## 2023-12-18 ASSESSMENT — ACTIVITIES OF DAILY LIVING (ADL)
ADLS_ACUITY_SCORE: 35
ADLS_ACUITY_SCORE: 35

## 2023-12-18 NOTE — ED TRIAGE NOTES
"Pt here d/t ripping upper left quadrant pain starting yesterday morning upon waking. Worse when eating. Heat and pressure helps with the pain. Was worked up in the past for similar w/o any acute findings. More frequent bowel movements, still formed but \"more mushy\". Endorses nausea w/o vomiting. PMH: colitis \"over 25yrs ago\"       Triage Assessment (Adult)       Row Name 12/18/23 0633          Triage Assessment    Airway WDL WDL        Respiratory WDL    Respiratory WDL WDL        Skin Circulation/Temperature WDL    Skin Circulation/Temperature WDL WDL                     "

## 2023-12-18 NOTE — DISCHARGE INSTRUCTIONS
Your lab work is normal today, CT scan did not show any evidence of pancreatitis or blockage or narrowing of the arteries.  The screening of your heart was also normal thankfully.  Please call your gastroenterology clinic, let them know you are seen in the emergency department due to pain, and see if you can get fit in for an earlier appointment for reevaluation before the end of January.    Please start the famotidine and omeprazole today you  prescriptions.    You can take Tylenol as needed for pain at home you can use his oxycodone for breakthrough pain.  I recommend holding off of ibuprofen until told it is okay to resume by gastroenterology.    Oxycodone can cause constipation, make sure that you have high-fiber diet and drink plenty of fluids, if you have issues with constipation and also recommend adding on fiber supplement such as Metamucil, or MiraLAX.

## 2023-12-18 NOTE — ED PROVIDER NOTES
EMERGENCY DEPARTMENT ENCOUNTER      NAME: Anisa Love  AGE: 43 year old female  YOB: 1980  MRN: [id number]  EVALUATION DATE & TIME: 12/18/2023  6:37 AM    PCP: Tiffani Cassidy    ED PROVIDER: Dmitri Short M.D.      Chief Complaint   Patient presents with    Abdominal Pain     ULQ         FINAL IMPRESSION:  1. LUQ abdominal pain          ED COURSE & MEDICAL DECISION MAKING:    Pertinent Labs & Imaging studies reviewed below.  All EKGs below represent my independent interpretation.   ED Course as of 12/18/23 1359   Mon Dec 18, 2023   0659 Patient is a 43-year-old who presents with severe left upper quadrant pain that began yesterday morning, has persisted, worse with eating.  She has had this issue for quite a while and has undergone workup as an outpatient with GI and OB.  I was able to review the chart, see her reassuring EGD, colonoscopy.  She had recent hysteroscopy with bilateral tubal ligation, but there was no sign of endometriosis as the cause of her pain.  Here in the emergency department shows normal blood pressure, heart rate and temperature.  She is uncomfortable, but has pain she indicates is in the left upper quadrant.  She is somewhat tender in the left lower quadrant, which is more likely due to pain from the laparoscopic incisions.  His lab work back in August had transiently elevated lipase, plan to repeat check this.  Although very atypical for ACS, this will be worked up as well.  Will give IV analgesia, fluids, antiemetics.  Imaging as needed based on lab work.  She has already undergone CT abdomen pelvis, MRCP.  She is status post cholecystectomy.   0712 EKG shows sinus rhythm with rate of 75.  No acute ischemic ST or T wave morphology.  Normal axis, normal intervals.  No prior EKG for comparison.  Impression: Normal sinus rhythm, normal EKG   0732 WBC: 6.4   0750 Troponin T, High Sensitivity: <6   0751 Lipase: 37   0751 Hepatic function panel(!)  Normal bili and ALT. AST  mildly elevated at 106, nonspecific   0751 Pain persistent, dilaudid ordered   0756 After reviewing imaging patient, she has not had an angiography done, mesenteric ischemia could present as postprandial pain.  This was ordered.   0857 CTA Abdomen Pelvis with Contrast  Patent mesenteric vasculature without evidence of bowel ischemia or other acute abnormality in the abdomen or pelvis.   The patient, provided reassurance, no evidence of intra-abdominal infection, pancreatitis, or vascular emergency.  Sent home with few doses of oxycodone, PPI, H2 blocker.  I recommended GI follow-up.    Additional ED Course Timestamps:  6:26 AM: I reviewed the patient's records and labs from 8/4/2023 and 9/18/2023.  6:41 AM I met with the patient, obtained history, performed an initial exam, and discussed options and plan for diagnostics and treatment here in the ED.   7:45 AM I rechecked on the patient.      Medical Decision Making    History:  Supplemental history from: Documented in chart, if applicable  External Record(s) reviewed: Outpatient Record: [facility] 8/4/2023 and 9/18/2023    Work Up:  Chart documentation includes differential considered and any EKGs or imaging independently interpreted by provider, where specified.  In additional to work up documented, I considered the following work up: Documented in chart, if applicable.    External consultation:  Discussion of management with another provider: Documented in chart, if applicable    Complicating factors:  Care impacted by chronic illness: Chronic Pain and Hypertension  Care affected by social determinants of health: N/A    Disposition considerations: Discharge. I prescribed additional prescription strength medication(s) as charted. N/A.      At the conclusion of the encounter I discussed the results of all of the tests and the disposition. The questions were answered. The patient or family acknowledged understanding and was  "agreeable with the care plan.       MEDICATIONS GIVEN IN THE EMERGENCY:  Medications   ketorolac (TORADOL) injection 15 mg (15 mg Intravenous $Given 12/18/23 0710)   ondansetron (ZOFRAN) injection 4 mg (4 mg Intravenous $Given 12/18/23 0714)   sodium chloride 0.9% BOLUS 1,000 mL (0 mLs Intravenous Stopped 12/18/23 0758)   famotidine (PEPCID) injection 20 mg (20 mg Intravenous $Given 12/18/23 0716)   HYDROmorphone (PF) (DILAUDID) injection 0.5 mg (0.5 mg Intravenous $Given 12/18/23 0754)   iopamidol (ISOVUE-370) solution 90 mL (90 mLs Intravenous $Given 12/18/23 0820)         NEW PRESCRIPTIONS STARTED AT TODAY'S ER VISIT  Discharge Medication List as of 12/18/2023  9:19 AM        START taking these medications    Details   famotidine (PEPCID) 20 MG tablet Take 2 tablets (40 mg) by mouth daily for 14 days, Disp-28 tablet, R-0, E-Prescribe      omeprazole (PRILOSEC) 20 MG DR capsule Take 1 capsule (20 mg) by mouth daily for 30 days, Disp-30 capsule, R-0, E-Prescribe      !! oxyCODONE (ROXICODONE) 5 MG tablet Take 1 tablet (5 mg) by mouth every 6 hours as needed for pain, Disp-12 tablet, R-0, E-Prescribe       !! - Potential duplicate medications found. Please discuss with provider.             =================================================================    HPI    Patient information was obtained from: patient     Use of : N/A         Anisa Love is a 43 year old female with a pertinent history of hypertension, depression, anxiety, and headaches  who presents to this ED for evaluation of abdominal pain.    The patient presents with \"severe, severe\", 9/10, LUQ pain that has come and gone since yesterday morning. Currently, the pain is a 6.5/10 and she is nauseated. She has had this pain in the past but this is the worst it has ever been. It does not radiate and does not get worse with deep breaths. She notes that she got the pain after eating and then took some leftover pain medications. She ate again " "and the pain came back but was not as bad. Of note, she had an exploratory procedure done to evaluate whether her chronic LUQ pain is endometriosis. They ultimately determined that it was not endometriosis but did perform a tubal ligation. She had a cholecystectomy in the past.     She denies any other complaints at this time.       Chart Review:  8/4/2023: The patient was seen in August this year [facility] for evaluation of LUQ pain, diarrhea, and bright red blood in stools that had both been constant for 3 months. Labs that had been drawn by her PCP revealed normal CBC and BMP, Lipase was elevated at 751. This was repeated after a clear liquid diet x 2 days and was normal at 30 on 7/28/2023. CT unremarkable. Colonoscopy revealed a normal ileum. There was congested mucosa in the sigmoid colon. The rest of the colon appeared normal. There were nonbleeding internal hemorrhoids present. Random biopsies of the colon were unremarkable. Upper endoscopy revealed a variable Z-line, erythematous mucosa in the stomach, and normal duodenum. Biopsies of the stomach revealed chronic gastritis without activity and were negative for H pylori. Biopsies of the duodenum were unremarkable.   9/18/2023: The patient continued to experience LUQ pain and had a telemedicine visit in September. Following her visit on 8/4, MRCP was ordered as well as CRP, fecal calprotectin, and stool for H pylori. CRP, fecal calprotectin, and H pylori were unremarkable. MRCP was normal. Colonoscopy was performed on August 16, 2023 revealing a fair colon prep normal terminal ileum, nonbleeding internal hemorrhoids, and normal mucosa in the entire examined colon. Colon was noted to be tortuous. Random biopsies were unremarkable. Patient was advised repeat colonoscopy in 10 years.          VITALS:  /83   Pulse 65   Temp 97.5  F (36.4  C) (Temporal)   Resp 14   Ht 1.626 m (5' 4\")   Wt 77.1 kg (170 lb)   SpO2 100%   BMI 29.18 " kg/m      PHYSICAL EXAM    Constitutional: Well developed, well nourished. uncomfortable appearing.  HENT: Normocephalic, atraumatic, mucous membranes moist, nose normal. Neck- Supple, gross ROM intact.   Eyes: Pupils mid-range, conjunctiva without injection, no discharge.   Respiratory: Clear to auscultation bilaterally, no respiratory distress, no wheezing, speaks full sentences easily. No cough.  Cardiovascular: Normal heart rate, regular rhythm, no murmurs.   GI: Soft, no masses. LLQ tenderness.  Musculoskeletal: Moving all 4 extremities intentionally and without pain. No obvious deformity.  Skin: Warm, dry, no rash.  Neurologic: Alert & oriented x 3, cranial nerves grossly intact.  Psychiatric: Affect normal, cooperative.        I, Dereje Nair am serving as a scribe to document services personally performed by Dr. Dmitri Short based on my observation and the provider's statements to me. I, Dmitri Short MD attest that Dereje Nair is acting in a scribe capacity, has observed my performance of the services and has documented them in accordance with my direction.    Dmitri Short M.D.  Emergency Medicine  [facility] EMERGENCY DEPARTMENT  [address] [id number] [phone]  Dept: [phone]       Dmitri Short MD  12/18/23 1101

## 2023-12-23 LAB
ATRIAL RATE - MUSE: 75 BPM
DIASTOLIC BLOOD PRESSURE - MUSE: NORMAL MMHG
INTERPRETATION ECG - MUSE: NORMAL
P AXIS - MUSE: 57 DEGREES
PR INTERVAL - MUSE: 136 MS
QRS DURATION - MUSE: 84 MS
QT - MUSE: 384 MS
QTC - MUSE: 428 MS
R AXIS - MUSE: 68 DEGREES
SYSTOLIC BLOOD PRESSURE - MUSE: NORMAL MMHG
T AXIS - MUSE: 61 DEGREES
VENTRICULAR RATE- MUSE: 75 BPM

## 2024-01-17 ENCOUNTER — LAB REQUISITION (OUTPATIENT)
Dept: LAB | Facility: CLINIC | Age: 44
End: 2024-01-17
Payer: COMMERCIAL

## 2024-01-17 DIAGNOSIS — Z12.4 ENCOUNTER FOR SCREENING FOR MALIGNANT NEOPLASM OF CERVIX: ICD-10-CM

## 2024-01-17 PROCEDURE — G0145 SCR C/V CYTO,THINLAYER,RESCR: HCPCS | Mod: ORL | Performed by: OBSTETRICS & GYNECOLOGY

## 2024-01-17 PROCEDURE — 87624 HPV HI-RISK TYP POOLED RSLT: CPT | Mod: ORL | Performed by: OBSTETRICS & GYNECOLOGY

## 2024-01-23 LAB
BKR LAB AP GYN ADEQUACY: NORMAL
BKR LAB AP GYN INTERPRETATION: NORMAL
BKR LAB AP HPV REFLEX: NORMAL
BKR LAB AP LMP: NORMAL
BKR LAB AP PREVIOUS ABNL DX: NORMAL
BKR LAB AP PREVIOUS ABNORMAL: NORMAL
PATH REPORT.COMMENTS IMP SPEC: NORMAL
PATH REPORT.COMMENTS IMP SPEC: NORMAL
PATH REPORT.RELEVANT HX SPEC: NORMAL

## 2024-02-25 ENCOUNTER — HEALTH MAINTENANCE LETTER (OUTPATIENT)
Age: 44
End: 2024-02-25

## 2024-03-07 ENCOUNTER — ANESTHESIA EVENT (OUTPATIENT)
Dept: SURGERY | Facility: AMBULATORY SURGERY CENTER | Age: 44
End: 2024-03-07
Payer: COMMERCIAL

## 2024-03-08 ENCOUNTER — HOSPITAL ENCOUNTER (OUTPATIENT)
Facility: AMBULATORY SURGERY CENTER | Age: 44
Discharge: HOME | End: 2024-03-08
Attending: OBSTETRICS & GYNECOLOGY
Payer: COMMERCIAL

## 2024-03-08 ENCOUNTER — ANESTHESIA (OUTPATIENT)
Dept: SURGERY | Facility: AMBULATORY SURGERY CENTER | Age: 44
End: 2024-03-08
Payer: COMMERCIAL

## 2024-03-08 VITALS
HEART RATE: 66 BPM | TEMPERATURE: 96.7 F | RESPIRATION RATE: 16 BRPM | OXYGEN SATURATION: 98 % | WEIGHT: 170 LBS | HEIGHT: 64 IN | BODY MASS INDEX: 29.02 KG/M2 | DIASTOLIC BLOOD PRESSURE: 68 MMHG | SYSTOLIC BLOOD PRESSURE: 122 MMHG

## 2024-03-08 DIAGNOSIS — N92.0 MENORRHAGIA: ICD-10-CM

## 2024-03-08 DIAGNOSIS — Z98.890 S/P ENDOMETRIAL ABLATION: Primary | ICD-10-CM

## 2024-03-08 RX ORDER — ACETAMINOPHEN 325 MG/1
975 TABLET ORAL ONCE
Status: DISCONTINUED | OUTPATIENT
Start: 2024-03-08 | End: 2024-03-09 | Stop reason: HOSPADM

## 2024-03-08 RX ORDER — HYDROCODONE BITARTRATE AND ACETAMINOPHEN 5; 325 MG/1; MG/1
1-2 TABLET ORAL EVERY 4 HOURS PRN
Qty: 10 TABLET | Refills: 0 | Status: SHIPPED | OUTPATIENT
Start: 2024-03-08

## 2024-03-08 RX ORDER — LIDOCAINE HYDROCHLORIDE 10 MG/ML
INJECTION, SOLUTION EPIDURAL; INFILTRATION; INTRACAUDAL; PERINEURAL PRN
Status: DISCONTINUED | OUTPATIENT
Start: 2024-03-08 | End: 2024-03-08 | Stop reason: HOSPADM

## 2024-03-08 RX ORDER — HYDROXYZINE HYDROCHLORIDE 10 MG/1
10 TABLET, FILM COATED ORAL
Status: DISCONTINUED | OUTPATIENT
Start: 2024-03-08 | End: 2024-03-09 | Stop reason: HOSPADM

## 2024-03-08 RX ORDER — ONDANSETRON 4 MG/1
4 TABLET, ORALLY DISINTEGRATING ORAL EVERY 30 MIN PRN
Status: DISCONTINUED | OUTPATIENT
Start: 2024-03-08 | End: 2024-03-09 | Stop reason: HOSPADM

## 2024-03-08 RX ORDER — KETOROLAC TROMETHAMINE 30 MG/ML
INJECTION, SOLUTION INTRAMUSCULAR; INTRAVENOUS PRN
Status: DISCONTINUED | OUTPATIENT
Start: 2024-03-08 | End: 2024-03-08

## 2024-03-08 RX ORDER — PROPOFOL 10 MG/ML
INJECTION, EMULSION INTRAVENOUS PRN
Status: DISCONTINUED | OUTPATIENT
Start: 2024-03-08 | End: 2024-03-08

## 2024-03-08 RX ORDER — ONDANSETRON 2 MG/ML
INJECTION INTRAMUSCULAR; INTRAVENOUS PRN
Status: DISCONTINUED | OUTPATIENT
Start: 2024-03-08 | End: 2024-03-08

## 2024-03-08 RX ORDER — NALOXONE HYDROCHLORIDE 0.4 MG/ML
0.1 INJECTION, SOLUTION INTRAMUSCULAR; INTRAVENOUS; SUBCUTANEOUS
Status: DISCONTINUED | OUTPATIENT
Start: 2024-03-08 | End: 2024-03-09 | Stop reason: HOSPADM

## 2024-03-08 RX ORDER — IBUPROFEN 800 MG/1
800 TABLET, FILM COATED ORAL ONCE
Status: DISCONTINUED | OUTPATIENT
Start: 2024-03-08 | End: 2024-03-09 | Stop reason: HOSPADM

## 2024-03-08 RX ORDER — FENTANYL CITRATE 50 UG/ML
INJECTION, SOLUTION INTRAMUSCULAR; INTRAVENOUS PRN
Status: DISCONTINUED | OUTPATIENT
Start: 2024-03-08 | End: 2024-03-08

## 2024-03-08 RX ORDER — ACETAMINOPHEN 325 MG/1
975 TABLET ORAL ONCE
Status: COMPLETED | OUTPATIENT
Start: 2024-03-08 | End: 2024-03-08

## 2024-03-08 RX ORDER — OXYCODONE HYDROCHLORIDE 5 MG/1
5 TABLET ORAL
Status: CANCELLED | OUTPATIENT
Start: 2024-03-08

## 2024-03-08 RX ORDER — HYDROMORPHONE HCL IN WATER/PF 6 MG/30 ML
0.4 PATIENT CONTROLLED ANALGESIA SYRINGE INTRAVENOUS EVERY 5 MIN PRN
Status: DISCONTINUED | OUTPATIENT
Start: 2024-03-08 | End: 2024-03-09 | Stop reason: HOSPADM

## 2024-03-08 RX ORDER — IBUPROFEN 800 MG/1
800 TABLET, FILM COATED ORAL EVERY 6 HOURS PRN
Qty: 30 TABLET | Refills: 0 | Status: SHIPPED | OUTPATIENT
Start: 2024-03-08

## 2024-03-08 RX ORDER — DEXAMETHASONE SODIUM PHOSPHATE 4 MG/ML
INJECTION, SOLUTION INTRA-ARTICULAR; INTRALESIONAL; INTRAMUSCULAR; INTRAVENOUS; SOFT TISSUE PRN
Status: DISCONTINUED | OUTPATIENT
Start: 2024-03-08 | End: 2024-03-08

## 2024-03-08 RX ORDER — FENTANYL CITRATE 0.05 MG/ML
50 INJECTION, SOLUTION INTRAMUSCULAR; INTRAVENOUS EVERY 5 MIN PRN
Status: DISCONTINUED | OUTPATIENT
Start: 2024-03-08 | End: 2024-03-09 | Stop reason: HOSPADM

## 2024-03-08 RX ORDER — PROPOFOL 10 MG/ML
INJECTION, EMULSION INTRAVENOUS CONTINUOUS PRN
Status: DISCONTINUED | OUTPATIENT
Start: 2024-03-08 | End: 2024-03-08

## 2024-03-08 RX ORDER — SODIUM CHLORIDE, SODIUM LACTATE, POTASSIUM CHLORIDE, CALCIUM CHLORIDE 600; 310; 30; 20 MG/100ML; MG/100ML; MG/100ML; MG/100ML
INJECTION, SOLUTION INTRAVENOUS CONTINUOUS
Status: DISCONTINUED | OUTPATIENT
Start: 2024-03-08 | End: 2024-03-09 | Stop reason: HOSPADM

## 2024-03-08 RX ORDER — HYDROMORPHONE HYDROCHLORIDE 2 MG/1
2 TABLET ORAL
Status: DISCONTINUED | OUTPATIENT
Start: 2024-03-08 | End: 2024-03-09 | Stop reason: HOSPADM

## 2024-03-08 RX ORDER — HYDROCODONE BITARTRATE AND ACETAMINOPHEN 5; 325 MG/1; MG/1
1 TABLET ORAL EVERY 6 HOURS PRN
Status: COMPLETED | OUTPATIENT
Start: 2024-03-08 | End: 2024-03-08

## 2024-03-08 RX ORDER — LIDOCAINE 40 MG/G
CREAM TOPICAL
Status: DISCONTINUED | OUTPATIENT
Start: 2024-03-08 | End: 2024-03-09 | Stop reason: HOSPADM

## 2024-03-08 RX ORDER — ONDANSETRON 4 MG/1
4 TABLET, ORALLY DISINTEGRATING ORAL EVERY 8 HOURS PRN
Qty: 4 TABLET | Refills: 0 | Status: SHIPPED | OUTPATIENT
Start: 2024-03-08

## 2024-03-08 RX ORDER — KETOROLAC TROMETHAMINE 15 MG/ML
15 INJECTION, SOLUTION INTRAMUSCULAR; INTRAVENOUS
Status: DISCONTINUED | OUTPATIENT
Start: 2024-03-08 | End: 2024-03-09 | Stop reason: HOSPADM

## 2024-03-08 RX ORDER — OXYCODONE HYDROCHLORIDE 10 MG/1
10 TABLET ORAL
Status: CANCELLED | OUTPATIENT
Start: 2024-03-08

## 2024-03-08 RX ORDER — GLYCOPYRROLATE 0.2 MG/ML
INJECTION, SOLUTION INTRAMUSCULAR; INTRAVENOUS PRN
Status: DISCONTINUED | OUTPATIENT
Start: 2024-03-08 | End: 2024-03-08

## 2024-03-08 RX ORDER — KETOROLAC TROMETHAMINE 30 MG/ML
30 INJECTION, SOLUTION INTRAMUSCULAR; INTRAVENOUS ONCE
Status: COMPLETED | OUTPATIENT
Start: 2024-03-08 | End: 2024-03-08

## 2024-03-08 RX ORDER — FENTANYL CITRATE 0.05 MG/ML
25 INJECTION, SOLUTION INTRAMUSCULAR; INTRAVENOUS EVERY 5 MIN PRN
Status: DISCONTINUED | OUTPATIENT
Start: 2024-03-08 | End: 2024-03-09 | Stop reason: HOSPADM

## 2024-03-08 RX ORDER — ONDANSETRON 2 MG/ML
4 INJECTION INTRAMUSCULAR; INTRAVENOUS EVERY 30 MIN PRN
Status: DISCONTINUED | OUTPATIENT
Start: 2024-03-08 | End: 2024-03-09 | Stop reason: HOSPADM

## 2024-03-08 RX ORDER — LIDOCAINE HYDROCHLORIDE 20 MG/ML
INJECTION, SOLUTION INFILTRATION; PERINEURAL PRN
Status: DISCONTINUED | OUTPATIENT
Start: 2024-03-08 | End: 2024-03-08

## 2024-03-08 RX ORDER — MEPERIDINE HYDROCHLORIDE 25 MG/ML
12.5 INJECTION INTRAMUSCULAR; INTRAVENOUS; SUBCUTANEOUS EVERY 5 MIN PRN
Status: DISCONTINUED | OUTPATIENT
Start: 2024-03-08 | End: 2024-03-09 | Stop reason: HOSPADM

## 2024-03-08 RX ORDER — HYDROMORPHONE HCL IN WATER/PF 6 MG/30 ML
0.2 PATIENT CONTROLLED ANALGESIA SYRINGE INTRAVENOUS EVERY 5 MIN PRN
Status: DISCONTINUED | OUTPATIENT
Start: 2024-03-08 | End: 2024-03-09 | Stop reason: HOSPADM

## 2024-03-08 RX ADMIN — SODIUM CHLORIDE, SODIUM LACTATE, POTASSIUM CHLORIDE, CALCIUM CHLORIDE: 600; 310; 30; 20 INJECTION, SOLUTION INTRAVENOUS at 11:48

## 2024-03-08 RX ADMIN — HYDROCODONE BITARTRATE AND ACETAMINOPHEN 1 TABLET: 5; 325 TABLET ORAL at 13:28

## 2024-03-08 RX ADMIN — ACETAMINOPHEN 975 MG: 325 TABLET ORAL at 11:30

## 2024-03-08 RX ADMIN — FENTANYL CITRATE 25 MCG: 50 INJECTION, SOLUTION INTRAMUSCULAR; INTRAVENOUS at 12:23

## 2024-03-08 RX ADMIN — GLYCOPYRROLATE 0.2 MG: 0.2 INJECTION, SOLUTION INTRAMUSCULAR; INTRAVENOUS at 12:15

## 2024-03-08 RX ADMIN — PROPOFOL 300 MCG/KG/MIN: 10 INJECTION, EMULSION INTRAVENOUS at 12:17

## 2024-03-08 RX ADMIN — LIDOCAINE HYDROCHLORIDE 50 MG: 20 INJECTION, SOLUTION INFILTRATION; PERINEURAL at 12:15

## 2024-03-08 RX ADMIN — KETOROLAC TROMETHAMINE 15 MG: 30 INJECTION, SOLUTION INTRAMUSCULAR; INTRAVENOUS at 12:40

## 2024-03-08 RX ADMIN — DEXAMETHASONE SODIUM PHOSPHATE 4 MG: 4 INJECTION, SOLUTION INTRA-ARTICULAR; INTRALESIONAL; INTRAMUSCULAR; INTRAVENOUS; SOFT TISSUE at 12:15

## 2024-03-08 RX ADMIN — PROPOFOL 30 MG: 10 INJECTION, EMULSION INTRAVENOUS at 12:17

## 2024-03-08 RX ADMIN — ONDANSETRON 4 MG: 2 INJECTION INTRAMUSCULAR; INTRAVENOUS at 12:15

## 2024-03-08 NOTE — OP NOTE
PROCEDURE NOTE - HYSTEROSCOPY AND DILATION AND CURETTAGE / ENDOMETRIAL ABLATION USING THE NOVASURE    Name: Anisa Love   : 1980   MRN: [id number]     DATE OF SERVICE:  3/8/2024     PREOPERATIVE DIAGNOSIS:  Menorrhagia    POSTOPERATIVE DIAGNOSIS: Dysfunctional uterine bleeding secondary to thickened endometrium    PROCEDURES: Hysteroscopy, dilatation and curettage, endometrial ablation with the Novasure    SURGEON: Anitha Archuleta DO     ASSISTANT: OR staff    ANESTHESIA:   MAC    ESTIMATED BLOOD LOSS:   20ml    HISTORY OF PRESENT ILLNESS:  This is a 44 year old female with history of dysfunctional uterine bleeding thought to be secondary to thickened endometrium. She had a transvaginal ultrasound prior to which showed a thickened endometrium.  She was given informed consent for the above procedure. The risks, benefits and alternatives were discussed with her including but not exclusive to uterine perforation, bleeding and infection. She expressed understanding and wished to proceed.     PROCEDURE:  The patient was brought to the operating room and after induction of MAC anesthesia was prepped and draped in the dorsal lithotomy position. A time out was called and the patient and the procedure were verified.  A bimanual exam was done, indicating a normal mobile uterus. No other abnormalities were noted.     A sterile weighted speculum was then placed. The anterior lip of the cervix was grasped with a single tooth tenaculum. Uterine cavity was then sounded to 7cm. The cervix was already dilated and the hysteroscope was introduced without difficulty. The cavity of the uterus appeared smooth in contour and appearance. The hysteroscope was removed. At this point endometrial curettings were obtained by curettage. All quadrants were sampled, and the tissue was submitted for pathologic exam.     The Novasure sound was inserted and the cavity measured at 4.5cm. The Novasure device was inserted and cavity  assessment was performed.  The device was then activated and ran for a duration of 1 minute and 19 seconds.  Instruments were removed from vagina. No active bleeding was noted. Sponge and needle counts were correct X 2. She was taken to recovery in stable condition.    SPECIMENS SENT: endometrial curettings    Anitha Archuleta DO        CC: Tiffani Cassidy, Anitha Archuleta, DO

## 2024-03-08 NOTE — ANESTHESIA POSTPROCEDURE EVALUATION
Patient: Anisa Love    Procedure: Procedure(s):  HYSTEROSCOPY, WITH ENDOMETRIAL ABLATION       Anesthesia Type:  MAC    Note:  Disposition: Outpatient   Postop Pain Control: Uneventful            Sign Out: Well controlled pain   PONV: No   Neuro/Psych: Uneventful            Sign Out: Acceptable/Baseline neuro status   Airway/Respiratory: Uneventful            Sign Out: Acceptable/Baseline resp. status   CV/Hemodynamics: Uneventful            Sign Out: Acceptable CV status; No obvious hypovolemia; No obvious fluid overload   Other NRE: NONE   DID A NON-ROUTINE EVENT OCCUR? No           Last vitals:  Vitals Value Taken Time   /73 03/08/24 1300   Temp 96.7  F (35.9  C) 03/08/24 1247   Pulse 86 03/08/24 1304   Resp 16 03/08/24 1247   SpO2 97 % 03/08/24 1304   Vitals shown include unfiled device data.    Electronically Signed By: Eduardo Fernandez MD  March 8, 2024  1:07 PM

## 2024-03-08 NOTE — DISCHARGE INSTRUCTIONS
You have received 975 mg of Acetaminophen (Tylenol) at 11:30. Please do not take an additional dose of Tylenol until after 5:30 pm     Do not exceed 4,000 mg of acetaminophen during a 24 hour period and keep in mind that acetaminophen can also be found in many over-the-counter cold medications as well as narcotics that may be given for pain.    You received a dose of IV Toradol at 12:40 pm. Please do not take any additional Ibuprofen/NSAID products (Aleve/Advil/Motrin/Naproxen/Celebrex) until after 6:40 pm.       If you have any questions or concerns regarding your procedure, please contact Dr. Archuleta, her office number is [phone].         Discharge Instructions: After Your Surgery, regarding anesthesia    You ve just had surgery. During surgery, you were given medicine called anesthesia to keep you relaxed and free of pain. After surgery, you may have some pain or nausea. This is common. Here are some tips for feeling better and getting well after surgery.    Going home    Your healthcare provider will show you how to take care of yourself when you go home. He or she will also answer your questions. Have an adult family member or friend drive you home. For the first 24 hours after your surgery:    Don't drive or use heavy equipment.  Don't make important decisions or sign legal papers.  Don't drink alcohol.  Have an adult stay with you for the next 24 hours. He or she can watch for problems and help keep you safe.  Be sure to go to all follow-up visits with your healthcare provider. And rest after your surgery for as long as your healthcare provider tells you to.    Managing nausea    Some people have an upset stomach after surgery. This is often because of anesthesia, pain, or pain medicine, or the stress of surgery. These tips will help you handle nausea and eat healthy foods as you get better. If you were on a special food plan before surgery, ask your healthcare provider if you should follow it while you  get better. These tips may help:    Don't push yourself to eat. Your body will tell you when to eat and how much.  Start off with clear liquids and soup. They are easier to digest.  Next try semi-solid foods, such as mashed potatoes, applesauce, and gelatin, as you feel ready.  Slowly move to solid foods. Don t eat fatty, rich, or spicy foods at first.  Don't force yourself to have 3 large meals a day. Instead eat smaller amounts more often.  Take pain medicines with a small amount of solid food, such as crackers or toast, to prevent nausea.    If you have obstructive sleep apnea    You were given anesthesia medicine during surgery to keep you comfortable and free of pain. After surgery, you may have more apnea spells because of this medicine and other medicines you were given. The spells may last longer than usual.   At home:    Keep using the continuous positive airway pressure (CPAP) device when you sleep. Unless your healthcare provider tells you not to, use it when you sleep, day or night. CPAP is a common device used to treat obstructive sleep apnea.  Talk with your provider before taking any pain medicine, muscle relaxants, or sedatives. Your provider will tell you about the possible dangers of taking these medicines.

## 2024-03-08 NOTE — ANESTHESIA PREPROCEDURE EVALUATION
Anesthesia Pre-Procedure Evaluation    Patient: Anisa Love   MRN: [id number] : 1980        Procedure : Procedure(s):  HYSTEROSCOPY, WITH ENDOMETRIAL ABLATION (Uterus)      Past Medical History:   Diagnosis Date    Depression     GERD (gastroesophageal reflux disease)     Gonorrhea     HPV (human papilloma virus) anogenital infection     Hypertension     Mental disorder     Motion sickness     Noninfectious ileitis     Other chronic pain 2023    PONV (postoperative nausea and vomiting)     Sleep apnea     Slight does not use a CPAP    TMJ (temporomandibular joint syndrome)     Pt is able to put 3 fingers stacked in mouth    Ulcerative colitis (H)       Past Surgical History:   Procedure Laterality Date     SECTION N/A 2019    Procedure:  SECTION;  Surgeon: Magi Ayala MD;  Location: [facility] OR;  Service: Obstetrics    HYSTEROSCOPY, WITH ENDOMETRIAL RADIOFREQUENCY ABLATION - NOVASURE  2023    Procedure: HYSTEROSCOPY with DILATION AND CURETTAGE, DIAGNOSTIC LAPAROSCOPY, BILATERAL LAPAROSCOPIC SALPINGECTOMY;  Surgeon: Anitha Archuleta DO;  Location: [facility] OR    LAPAROSCOPIC CHOLECYSTECTOMY  2019    LAPAROSCOPY DIAGNOSTIC (GYN) N/A 8/3/2022    Procedure: DIAGNOSTIC LAPAROSCOPY, excision of endometrial cysts and cauterizaton of endometrial cysts;  Surgeon: Minerva York MD;  Location: [facility] OR    WISDOM TOOTH EXTRACTION        No Known Allergies   Social History     Tobacco Use    Smoking status: Some Days     Packs/day: .2     Types: Cigarettes    Smokeless tobacco: Never   Substance Use Topics    Alcohol use: Yes     Comment: Occasional      Wt Readings from Last 1 Encounters:   23 77.1 kg (170 lb)        Anesthesia Evaluation            ROS/MED HX  ENT/Pulmonary:     (+) sleep apnea,                                       Neurologic:       Cardiovascular:     (+)  hypertension- -   -  - -                                    (-) murmur   METS/Exercise Tolerance:     Hematologic:       Musculoskeletal:       GI/Hepatic:     (+) GERD,      Inflammatory bowel disease,             Renal/Genitourinary:       Endo:       Psychiatric/Substance Use:     (+) psychiatric history depression       Infectious Disease:       Malignancy:       Other:            Physical Exam    Airway  airway exam normal      Mallampati: II   TM distance: > 3 FB   Neck ROM: full   Mouth opening: > 3 cm    Respiratory Devices and Support         Dental       (+) Completely normal teeth      Cardiovascular   cardiovascular exam normal       Rhythm and rate: regular and normal (-) no murmur    Pulmonary   pulmonary exam normal        breath sounds clear to auscultation           OUTSIDE LABS:  CBC:   Lab Results   Component Value Date    WBC 6.4 12/18/2023    WBC 6.7 07/06/2023    HGB 14.9 12/18/2023    HGB 13.5 12/11/2023    HCT 43.6 12/18/2023    HCT 45.9 07/06/2023     12/18/2023     07/06/2023     BMP:   Lab Results   Component Value Date     12/18/2023     07/06/2023    POTASSIUM 4.1 12/18/2023    POTASSIUM 4.3 07/06/2023    CHLORIDE 102 12/18/2023    CHLORIDE 101 07/06/2023    CO2 28 12/18/2023    CO2 26 07/06/2023    BUN 10.5 12/18/2023    BUN 9.9 07/06/2023    CR 0.76 12/18/2023    CR 0.79 07/06/2023     (H) 12/18/2023     (H) 07/06/2023     COAGS:   Lab Results   Component Value Date    PTT 26 12/30/2019    INR 0.97 12/30/2019     POC:   Lab Results   Component Value Date    HCG Negative 12/11/2023    HCGS Negative 03/14/2022     HEPATIC:   Lab Results   Component Value Date    ALBUMIN 4.5 12/18/2023    PROTTOTAL 6.8 12/18/2023     (H) 12/18/2023    AST 27 12/18/2023    ALKPHOS 46 12/18/2023    BILITOTAL 0.5 12/18/2023     OTHER:   Lab Results   Component Value Date    CLAUDIA 9.2 12/18/2023    MAG 5.9 (HH) 12/31/2019    LIPASE 37 12/18/2023    TSH 1.65 05/08/2020       Anesthesia Plan    ASA Status:  2    NPO Status:   NPO Appropriate    Anesthesia Type: MAC.   Induction: Intravenous.   Maintenance: TIVA.        Consents    Anesthesia Plan(s) and associated risks, benefits, and realistic alternatives discussed. Questions answered and patient/representative(s) expressed understanding.     - Discussed: Risks, Benefits and Alternatives for BOTH SEDATION and the PROCEDURE were discussed     - Discussed with:  Patient            Postoperative Care    Pain management: IV analgesics, Oral pain medications.   PONV prophylaxis: Ondansetron (or other 5HT-3), Dexamethasone or Solumedrol, Background Propofol Infusion     Comments:    Other Comments: Patient history and physical exam reviewed. NPO status appropriate. Focused physical and history performed, pre-op evaluation updated. RBA discussed re: anesthesia and patients questions answered.     Patient requests zofran and decadron during the procedure for PONV             Eduardo Fernandez MD    I have reviewed the pertinent notes and labs in the chart from the past 30 days and (re)examined the patient.  Any updates or changes from those notes are reflected in this note.

## 2024-03-08 NOTE — ANESTHESIA CARE TRANSFER NOTE
Patient: Anisa Love    Procedure: Procedure(s):  HYSTEROSCOPY, WITH ENDOMETRIAL ABLATION       Diagnosis: Menorrhagia [N92.0]  Diagnosis Additional Information: No value filed.    Anesthesia Type:   MAC     Note:    Oropharynx: oropharynx clear of all foreign objects  Level of Consciousness: drowsy  Oxygen Supplementation: face mask  Level of Supplemental Oxygen (L/min / FiO2): 8  Independent Airway: airway patency satisfactory and stable  Dentition: dentition unchanged  Vital Signs Stable: post-procedure vital signs reviewed and stable  Report to RN Given: handoff report given  Patient transferred to: Phase II    Handoff Report: Identifed the Patient, Identified the Reponsible Provider, Reviewed the pertinent medical history, Discussed the surgical course, Reviewed Intra-OP anesthesia mangement and issues during anesthesia, Set expectations for post-procedure period and Allowed opportunity for questions and acknowledgement of understanding      Vitals:  Vitals Value Taken Time   /82    Temp 37C    Pulse 80    Resp 14    SpO2 99        Electronically Signed By: HERNÁN Jameson CRNA  March 8, 2024  12:47 PM

## 2024-04-09 ENCOUNTER — LAB REQUISITION (OUTPATIENT)
Dept: LAB | Facility: CLINIC | Age: 44
End: 2024-04-09

## 2024-04-09 DIAGNOSIS — J02.9 ACUTE PHARYNGITIS, UNSPECIFIED: ICD-10-CM

## 2024-04-09 PROCEDURE — 87081 CULTURE SCREEN ONLY: CPT | Performed by: PHYSICIAN ASSISTANT

## 2024-04-11 LAB — BACTERIA SPEC CULT: NORMAL

## 2024-04-23 ENCOUNTER — APPOINTMENT (OUTPATIENT)
Dept: CT IMAGING | Facility: CLINIC | Age: 44
End: 2024-04-23
Attending: EMERGENCY MEDICINE
Payer: COMMERCIAL

## 2024-04-23 ENCOUNTER — HOSPITAL ENCOUNTER (EMERGENCY)
Facility: CLINIC | Age: 44
Discharge: HOME | End: 2024-04-23
Attending: EMERGENCY MEDICINE | Admitting: EMERGENCY MEDICINE
Payer: COMMERCIAL

## 2024-04-23 VITALS
WEIGHT: 185 LBS | HEART RATE: 73 BPM | TEMPERATURE: 98.7 F | DIASTOLIC BLOOD PRESSURE: 79 MMHG | OXYGEN SATURATION: 98 % | HEIGHT: 64 IN | SYSTOLIC BLOOD PRESSURE: 123 MMHG | BODY MASS INDEX: 31.58 KG/M2 | RESPIRATION RATE: 18 BRPM

## 2024-04-23 DIAGNOSIS — G89.29 CHRONIC ABDOMINAL PAIN: ICD-10-CM

## 2024-04-23 DIAGNOSIS — R10.9 CHRONIC ABDOMINAL PAIN: ICD-10-CM

## 2024-04-23 PROBLEM — I10 HYPERTENSIVE DISORDER: Status: ACTIVE | Noted: 2024-04-23

## 2024-04-23 PROBLEM — K58.9 IRRITABLE BOWEL SYNDROME: Status: ACTIVE | Noted: 2024-04-23

## 2024-04-23 PROBLEM — F41.8 MIXED ANXIETY AND DEPRESSIVE DISORDER: Status: ACTIVE | Noted: 2024-04-23

## 2024-04-23 PROBLEM — K51.90 ULCERATIVE COLITIS (H): Status: ACTIVE | Noted: 2024-04-23

## 2024-04-23 PROBLEM — Z86.59 HISTORY OF EATING DISORDER: Status: ACTIVE | Noted: 2024-04-23

## 2024-04-23 LAB
ANION GAP SERPL CALCULATED.3IONS-SCNC: 11 MMOL/L (ref 7–15)
BASOPHILS # BLD AUTO: 0 10E3/UL (ref 0–0.2)
BASOPHILS NFR BLD AUTO: 0 %
BUN SERPL-MCNC: 9.4 MG/DL (ref 6–20)
CALCIUM SERPL-MCNC: 9.1 MG/DL (ref 8.6–10)
CHLORIDE SERPL-SCNC: 103 MMOL/L (ref 98–107)
CREAT SERPL-MCNC: 0.73 MG/DL (ref 0.51–0.95)
D DIMER PPP FEU-MCNC: <=0.27 UG/ML FEU (ref 0–0.5)
DEPRECATED HCO3 PLAS-SCNC: 25 MMOL/L (ref 22–29)
EGFRCR SERPLBLD CKD-EPI 2021: >90 ML/MIN/1.73M2
EOSINOPHIL # BLD AUTO: 0 10E3/UL (ref 0–0.7)
EOSINOPHIL NFR BLD AUTO: 0 %
ERYTHROCYTE [DISTWIDTH] IN BLOOD BY AUTOMATED COUNT: 12.4 % (ref 10–15)
GLUCOSE SERPL-MCNC: 95 MG/DL (ref 70–99)
HCG SERPL QL: NEGATIVE
HCT VFR BLD AUTO: 40.7 % (ref 35–47)
HGB BLD-MCNC: 14.1 G/DL (ref 11.7–15.7)
IMM GRANULOCYTES # BLD: 0 10E3/UL
IMM GRANULOCYTES NFR BLD: 0 %
LIPASE SERPL-CCNC: 21 U/L (ref 13–60)
LYMPHOCYTES # BLD AUTO: 1.5 10E3/UL (ref 0.8–5.3)
LYMPHOCYTES NFR BLD AUTO: 21 %
MCH RBC QN AUTO: 29.5 PG (ref 26.5–33)
MCHC RBC AUTO-ENTMCNC: 34.6 G/DL (ref 31.5–36.5)
MCV RBC AUTO: 85 FL (ref 78–100)
MONOCYTES # BLD AUTO: 0.4 10E3/UL (ref 0–1.3)
MONOCYTES NFR BLD AUTO: 6 %
NEUTROPHILS # BLD AUTO: 5.3 10E3/UL (ref 1.6–8.3)
NEUTROPHILS NFR BLD AUTO: 73 %
NRBC # BLD AUTO: 0 10E3/UL
NRBC BLD AUTO-RTO: 0 /100
PLATELET # BLD AUTO: 280 10E3/UL (ref 150–450)
POTASSIUM SERPL-SCNC: 3.9 MMOL/L (ref 3.4–5.3)
RBC # BLD AUTO: 4.78 10E6/UL (ref 3.8–5.2)
SODIUM SERPL-SCNC: 139 MMOL/L (ref 135–145)
TROPONIN T SERPL HS-MCNC: 6 NG/L
WBC # BLD AUTO: 7.3 10E3/UL (ref 4–11)

## 2024-04-23 PROCEDURE — 96374 THER/PROPH/DIAG INJ IV PUSH: CPT | Mod: 59

## 2024-04-23 PROCEDURE — 85379 FIBRIN DEGRADATION QUANT: CPT | Performed by: EMERGENCY MEDICINE

## 2024-04-23 PROCEDURE — 36415 COLL VENOUS BLD VENIPUNCTURE: CPT | Performed by: EMERGENCY MEDICINE

## 2024-04-23 PROCEDURE — 74177 CT ABD & PELVIS W/CONTRAST: CPT

## 2024-04-23 PROCEDURE — 85025 COMPLETE CBC W/AUTO DIFF WBC: CPT | Performed by: EMERGENCY MEDICINE

## 2024-04-23 PROCEDURE — 83690 ASSAY OF LIPASE: CPT | Performed by: EMERGENCY MEDICINE

## 2024-04-23 PROCEDURE — 96375 TX/PRO/DX INJ NEW DRUG ADDON: CPT

## 2024-04-23 PROCEDURE — 250N000013 HC RX MED GY IP 250 OP 250 PS 637: Performed by: EMERGENCY MEDICINE

## 2024-04-23 PROCEDURE — 84703 CHORIONIC GONADOTROPIN ASSAY: CPT | Performed by: EMERGENCY MEDICINE

## 2024-04-23 PROCEDURE — 99284 EMERGENCY DEPT VISIT MOD MDM: CPT | Mod: 25

## 2024-04-23 PROCEDURE — 250N000011 HC RX IP 250 OP 636: Performed by: EMERGENCY MEDICINE

## 2024-04-23 PROCEDURE — 84484 ASSAY OF TROPONIN QUANT: CPT | Performed by: EMERGENCY MEDICINE

## 2024-04-23 PROCEDURE — 80048 BASIC METABOLIC PNL TOTAL CA: CPT | Performed by: EMERGENCY MEDICINE

## 2024-04-23 RX ORDER — IOPAMIDOL 755 MG/ML
90 INJECTION, SOLUTION INTRAVASCULAR ONCE
Status: COMPLETED | OUTPATIENT
Start: 2024-04-23 | End: 2024-04-23

## 2024-04-23 RX ORDER — KETOROLAC TROMETHAMINE 15 MG/ML
15 INJECTION, SOLUTION INTRAMUSCULAR; INTRAVENOUS ONCE
Status: COMPLETED | OUTPATIENT
Start: 2024-04-23 | End: 2024-04-23

## 2024-04-23 RX ORDER — OXYCODONE HYDROCHLORIDE 5 MG/1
5 TABLET ORAL ONCE
Status: COMPLETED | OUTPATIENT
Start: 2024-04-23 | End: 2024-04-23

## 2024-04-23 RX ORDER — ONDANSETRON 2 MG/ML
4 INJECTION INTRAMUSCULAR; INTRAVENOUS ONCE
Status: COMPLETED | OUTPATIENT
Start: 2024-04-23 | End: 2024-04-23

## 2024-04-23 RX ORDER — OXYCODONE HYDROCHLORIDE 5 MG/1
5 TABLET ORAL EVERY 6 HOURS PRN
Qty: 12 TABLET | Refills: 0 | Status: SHIPPED | OUTPATIENT
Start: 2024-04-23 | End: 2024-04-26

## 2024-04-23 RX ADMIN — KETOROLAC TROMETHAMINE 15 MG: 15 INJECTION, SOLUTION INTRAMUSCULAR; INTRAVENOUS at 14:02

## 2024-04-23 RX ADMIN — OXYCODONE 5 MG: 5 TABLET ORAL at 14:03

## 2024-04-23 RX ADMIN — ONDANSETRON 4 MG: 2 INJECTION INTRAMUSCULAR; INTRAVENOUS at 14:02

## 2024-04-23 RX ADMIN — IOPAMIDOL 90 ML: 755 INJECTION, SOLUTION INTRAVENOUS at 14:29

## 2024-04-23 ASSESSMENT — COLUMBIA-SUICIDE SEVERITY RATING SCALE - C-SSRS
2. HAVE YOU ACTUALLY HAD ANY THOUGHTS OF KILLING YOURSELF IN THE PAST MONTH?: NO
6. HAVE YOU EVER DONE ANYTHING, STARTED TO DO ANYTHING, OR PREPARED TO DO ANYTHING TO END YOUR LIFE?: NO
1. IN THE PAST MONTH, HAVE YOU WISHED YOU WERE DEAD OR WISHED YOU COULD GO TO SLEEP AND NOT WAKE UP?: NO

## 2024-04-23 ASSESSMENT — ENCOUNTER SYMPTOMS: ABDOMINAL PAIN: 1

## 2024-04-23 NOTE — ED PROVIDER NOTES
EMERGENCY DEPARTMENT ENCOUNTER      NAME: Anisa Love  AGE: 44 year old female  YOB: 1980  MRN: [id number]  EVALUATION DATE & TIME: No admission date for patient encounter.    PCP: Tiffani Cassidy    ED PROVIDER: Milo Foreman MD      Chief Complaint   Patient presents with    Abdominal Pain         FINAL IMPRESSION:  1. Chronic abdominal pain          ED COURSE & MEDICAL DECISION MAKING:    Pertinent Labs & Imaging studies reviewed. (See chart for details)  44 year old female presents to the Emergency Department for evaluation of neck upper abdominal pain.  Has had endoscopy that did not show any ulcers.  Been seen by GI.  Also recently treated for endometriosis and recently had surgical exploration of not show any endometrial implants or left upper quadrant    Plan for ECG, BMP, troponin, lipase, D-dimer, CBC, BMP, CT imaging    Differential diagnosis including functional abdominal pain, constipation, renal colic, diverticulitis, ulcer, pulmonary emboli, ACS, splenic enlargement      Normal D-dimer makes pulmonary emboli unlikely therefore CTA PE not ordered    Workup not suggestive of ACS.  Denies any injuries to suggest fracture or splenic injury    Denies problems with constipation.  Lipase normal making pancreatitis unlikely.  ACS unlikely.  White blood cell count normal.    Given toradol and oxycodone and Zofran    patient is hoping to have another CAT scan of her abdomen ordered.  CT ordered      My independent read of CT no kidney stones or other cause of left upper quadrant abdominal pain.  Unclear cause of patient's symptoms.  Plan for discharge home with oxycodone for breakthrough pain and follow-up primary care doctor.    Medical Decision Making  Obtained supplemental history:Supplemental history obtained?: No  Reviewed external records: External records reviewed?: No  Care impacted by chronic illness:Hypertension  Care significantly affected by social determinants of health:N/A  Did  you consider but not order tests?: Work up considered but not performed and documented in chart, if applicable  Did you interpret images independently?: Independent interpretation of ECG and images noted in documentation, when applicable.  Consultation discussion with other provider:Did you involve another provider (consultant, , pharmacy, etc.)?: No  Admission considered. Patient was signed out to the oncoming physician, disposition pending.    At the conclusion of the encounter I discussed the results of all of the tests and the disposition. The questions were answered. The patient or family acknowledged understanding and was agreeable with the care plan.     MEDICATIONS GIVEN IN THE EMERGENCY:  Medications   ketorolac (TORADOL) injection 15 mg (15 mg Intravenous $Given 4/23/24 1402)   oxyCODONE (ROXICODONE) tablet 5 mg (5 mg Oral $Given 4/23/24 1403)   ondansetron (ZOFRAN) injection 4 mg (4 mg Intravenous $Given 4/23/24 1402)   iopamidol (ISOVUE-370) solution 90 mL (90 mLs Intravenous $Given 4/23/24 1429)       NEW PRESCRIPTIONS STARTED AT TODAY'S ER VISIT  New Prescriptions    OXYCODONE (ROXICODONE) 5 MG TABLET    Take 1 tablet (5 mg) by mouth every 6 hours as needed          =================================================================    HPI    Patient information was obtained from: patient     Use of : N/A         Anisa Love is a 44 year old female with a pertinent history of hypertension, multiple mental health diagnoses, and endometriosis who presents to this ED via walk-in for evaluation of abdominal pain.    The patient presents with chronic LUQ pain under her left breast. In the past, the pain has come and gone but for the last 3 weeks it has been more constant. She feels that it may be triggered by sickness because she had strep throat 3 weeks ago. The pain gets worse with eating and laying down. No change in pain with belching or bowel movements. She has had elevated pancreatic labs  "in the past and would like these drawn today. She has tried using different bras with no change. She had an EGD and colonoscopies in the past but \"my colon wasn't totally cleaned out\" so she is unsure about those results. She has had lower abdominal pain in the past as well and was formally diagnosed with endometriosis. No current lower abdominal pain. She had her tubes removed afterwards. She takes antidepressants, blood pressure medication, and Linzess.     She denies overlying rash, trauma, or any other complaints at this time.       REVIEW OF SYSTEMS   Review of Systems   Gastrointestinal:  Positive for abdominal pain (LUQ).   Skin:  Negative for rash.        PAST MEDICAL HISTORY:  Past Medical History:   Diagnosis Date    Depression     GERD (gastroesophageal reflux disease)     Gonorrhea     HPV (human papilloma virus) anogenital infection     Hypertension     Mental disorder     Motion sickness     Noninfectious ileitis     Other chronic pain 2023    PONV (postoperative nausea and vomiting)     Sleep apnea     Slight does not use a CPAP    TMJ (temporomandibular joint syndrome)     Pt is able to put 3 fingers stacked in mouth    Ulcerative colitis (H)        PAST SURGICAL HISTORY:  Past Surgical History:   Procedure Laterality Date     SECTION N/A 2019    Procedure:  SECTION;  Surgeon: Magi Ayala MD;  Location: [facility] OR;  Service: Obstetrics    HYSTEROSCOPY, WITH ENDOMETRIAL RADIOFREQUENCY ABLATION - NOVASURE  2023    Procedure: HYSTEROSCOPY with DILATION AND CURETTAGE, DIAGNOSTIC LAPAROSCOPY, BILATERAL LAPAROSCOPIC SALPINGECTOMY;  Surgeon: Anitha Archuleta DO;  Location: [facility] OR    HYSTEROSCOPY, WITH ENDOMETRIAL RADIOFREQUENCY ABLATION - NOVASURE N/A 3/8/2024    Procedure: HYSTEROSCOPY, WITH ENDOMETRIAL ABLATION;  Surgeon: Anitha Archuleta DO;  Location: [facility] OR    LAPAROSCOPIC CHOLECYSTECTOMY  2019    LAPAROSCOPY DIAGNOSTIC " "(GYN) N/A 8/3/2022    Procedure: DIAGNOSTIC LAPAROSCOPY, excision of endometrial cysts and cauterizaton of endometrial cysts;  Surgeon: Minerva York MD;  Location: [facility] OR    WISDOM TOOTH EXTRACTION             CURRENT MEDICATIONS:    oxyCODONE (ROXICODONE) 5 MG tablet  acyclovir (ZOVIRAX) 400 MG tablet  amLODIPine (NORVASC) 5 MG tablet  eszopiclone (LUNESTA) 2 MG tablet  HYDROcodone-acetaminophen (NORCO) 5-325 MG tablet  ibuprofen (ADVIL/MOTRIN) 800 MG tablet  linaclotide (LINZESS) 145 MCG capsule  losartan (COZAAR) 50 MG tablet  ondansetron (ZOFRAN ODT) 4 MG ODT tab  venlafaxine (EFFEXOR XR) 75 MG 24 hr capsule  venlafaxine (EFFEXOR) 37.5 MG tablet        ALLERGIES:  No Known Allergies    FAMILY HISTORY:  History reviewed. No pertinent family history.    SOCIAL HISTORY:   Social History     Socioeconomic History    Marital status: Single   Tobacco Use    Smoking status: Some Days     Current packs/day: 0.20     Types: Cigarettes    Smokeless tobacco: Never   Substance and Sexual Activity    Alcohol use: Yes     Comment: Occasional    Drug use: Yes     Types: Marijuana       VITALS:  /79   Pulse 73   Temp 98.7  F (37.1  C) (Temporal)   Resp 18   Ht 1.626 m (5' 4\")   Wt 83.9 kg (185 lb)   LMP 02/13/2024 (Approximate)   SpO2 98%   BMI 31.76 kg/m      PHYSICAL EXAM      Vitals: /79   Pulse 73   Temp 98.7  F (37.1  C) (Temporal)   Resp 18   Ht 1.626 m (5' 4\")   Wt 83.9 kg (185 lb)   LMP 02/13/2024 (Approximate)   SpO2 98%   BMI 31.76 kg/m    General: Appears in no acute distress, awake, alert, interactive.  Eyes: Conjunctivae non-injected. Sclera anicteric.  HENT: Atraumatic.  Neck: Supple.  Respiratory/Chest: Respiration unlabored.  Abdomen: non distended, no abdominal tenderness or guarding or rigidity  Musculoskeletal: Normal extremities. No edema or erythema.  Skin: Normal color. No rash or diaphoresis.  Neurologic: Face symmetric, moves all extremities spontaneously. " Speech clear.  Psychiatric: Oriented to person, place, and time. Affect appropriate.    LAB:  All pertinent labs reviewed and interpreted.  Results for orders placed or performed during the hospital encounter of 04/23/24   CT Abdomen Pelvis w Contrast    Impression    IMPRESSION:   1.  No findings to explain the patient's symptoms. No acute inflammatory process, bowel obstruction or hydronephrosis.  2.  Trace pelvic free fluid is likely physiologic and may reflect recent ovarian cyst rupture.  3.  Chronic findings as described above.   Result Value Ref Range    Lipase 21 13 - 60 U/L   Basic metabolic panel   Result Value Ref Range    Sodium 139 135 - 145 mmol/L    Potassium 3.9 3.4 - 5.3 mmol/L    Chloride 103 98 - 107 mmol/L    Carbon Dioxide (CO2) 25 22 - 29 mmol/L    Anion Gap 11 7 - 15 mmol/L    Urea Nitrogen 9.4 6.0 - 20.0 mg/dL    Creatinine 0.73 0.51 - 0.95 mg/dL    GFR Estimate >90 >60 mL/min/1.73m2    Calcium 9.1 8.6 - 10.0 mg/dL    Glucose 95 70 - 99 mg/dL   HCG QUALitative pregnancy (blood)   Result Value Ref Range    hCG Serum Qualitative Negative Negative   Troponin T, High Sensitivity (now)   Result Value Ref Range    Troponin T, High Sensitivity 6 <=14 ng/L   D dimer quantitative   Result Value Ref Range    D-Dimer Quantitative <=0.27 0.00 - 0.50 ug/mL FEU   CBC with platelets and differential   Result Value Ref Range    WBC Count 7.3 4.0 - 11.0 10e3/uL    RBC Count 4.78 3.80 - 5.20 10e6/uL    Hemoglobin 14.1 11.7 - 15.7 g/dL    Hematocrit 40.7 35.0 - 47.0 %    MCV 85 78 - 100 fL    MCH 29.5 26.5 - 33.0 pg    MCHC 34.6 31.5 - 36.5 g/dL    RDW 12.4 10.0 - 15.0 %    Platelet Count 280 150 - 450 10e3/uL    % Neutrophils 73 %    % Lymphocytes 21 %    % Monocytes 6 %    % Eosinophils 0 %    % Basophils 0 %    % Immature Granulocytes 0 %    NRBCs per 100 WBC 0 <1 /100    Absolute Neutrophils 5.3 1.6 - 8.3 10e3/uL    Absolute Lymphocytes 1.5 0.8 - 5.3 10e3/uL    Absolute Monocytes 0.4 0.0 - 1.3 10e3/uL     Absolute Eosinophils 0.0 0.0 - 0.7 10e3/uL    Absolute Basophils 0.0 0.0 - 0.2 10e3/uL    Absolute Immature Granulocytes 0.0 <=0.4 10e3/uL    Absolute NRBCs 0.0 10e3/uL       RADIOLOGY:  Reviewed all pertinent imaging. Please see official radiology report.  CT Abdomen Pelvis w Contrast   Final Result   IMPRESSION:    1.  No findings to explain the patient's symptoms. No acute inflammatory process, bowel obstruction or hydronephrosis.   2.  Trace pelvic free fluid is likely physiologic and may reflect recent ovarian cyst rupture.   3.  Chronic findings as described above.              PROCEDURES:   None        I, Dereje Nair, am serving as a scribe to document services personally performed by Milo Foreman MD based on my observation and the provider's statements to me. I, Milo Foreman MD, attest that Dereje Nair is acting in a scribe capacity, has observed my performance of the services and has documented them in accordance with my direction.    Milo Foreman MD  [facility] EMERGENCY ROOM  [address] [id number] [phone]      Milo Foreman MD  04/23/24 1688

## 2024-04-23 NOTE — DISCHARGE INSTRUCTIONS
Recommend follow-up with your primary care doctor    You should take ibuprofen, 600mg, three times per day as needed for pain.  You can also use acetaminophen, 650 mg, up to four times per day as needed for pain.  You can use these medications together, there are noconcerning interactions.  If this does not adequately control your pain, you can use 1-2 tabs of the prescribed oxycodone every 6 hours as needed for uncontrolled pain.  If you use this medication, you should not drive orperform other activities that require full attention as this medication may make you drowsy.  Oxycodone, like all opiate pain medications, is potentially habit forming and you should only use the minimum amount necessary tocontrol your pain.

## 2024-04-23 NOTE — ED TRIAGE NOTES
One year history of LUQ pain. Has seen multiple providers without cause being diagnosed. Last work up was in December and thought to be endometrosis and did have fallopian tubes removed. Last week, pain has been more persistant and is hoping that something can be diagnosed this time     Triage Assessment (Adult)       Row Name 04/23/24 1207          Triage Assessment    Airway WDL WDL        Respiratory WDL    Respiratory WDL WDL

## 2024-09-04 ENCOUNTER — LAB REQUISITION (OUTPATIENT)
Dept: LAB | Facility: CLINIC | Age: 44
End: 2024-09-04

## 2024-09-04 DIAGNOSIS — I10 ESSENTIAL (PRIMARY) HYPERTENSION: ICD-10-CM

## 2024-09-04 LAB — HGB BLD-MCNC: 14.6 G/DL (ref 11.7–15.7)

## 2024-09-04 PROCEDURE — 85018 HEMOGLOBIN: CPT | Performed by: FAMILY MEDICINE

## 2024-09-04 PROCEDURE — 80048 BASIC METABOLIC PNL TOTAL CA: CPT | Performed by: FAMILY MEDICINE

## 2024-09-05 LAB
ANION GAP SERPL CALCULATED.3IONS-SCNC: 12 MMOL/L (ref 7–15)
BUN SERPL-MCNC: 16.8 MG/DL (ref 6–20)
CALCIUM SERPL-MCNC: 9.4 MG/DL (ref 8.8–10.4)
CHLORIDE SERPL-SCNC: 100 MMOL/L (ref 98–107)
CREAT SERPL-MCNC: 0.76 MG/DL (ref 0.51–0.95)
EGFRCR SERPLBLD CKD-EPI 2021: >90 ML/MIN/1.73M2
GLUCOSE SERPL-MCNC: 113 MG/DL (ref 70–99)
HCO3 SERPL-SCNC: 25 MMOL/L (ref 22–29)
POTASSIUM SERPL-SCNC: 4.2 MMOL/L (ref 3.4–5.3)
SODIUM SERPL-SCNC: 137 MMOL/L (ref 135–145)

## 2024-11-02 ENCOUNTER — APPOINTMENT (OUTPATIENT)
Dept: CT IMAGING | Facility: HOSPITAL | Age: 44
DRG: 299 | End: 2024-11-02
Attending: EMERGENCY MEDICINE
Payer: COMMERCIAL

## 2024-11-02 ENCOUNTER — HOSPITAL ENCOUNTER (INPATIENT)
Facility: HOSPITAL | Age: 44
LOS: 2 days | Discharge: HOME | DRG: 299 | End: 2024-11-04
Attending: EMERGENCY MEDICINE | Admitting: STUDENT IN AN ORGANIZED HEALTH CARE EDUCATION/TRAINING PROGRAM
Payer: COMMERCIAL

## 2024-11-02 DIAGNOSIS — I81 PORTAL VEIN THROMBOSIS: ICD-10-CM

## 2024-11-02 DIAGNOSIS — I82.90 VENOUS THROMBOEMBOLISM: Primary | ICD-10-CM

## 2024-11-02 LAB
ALBUMIN SERPL BCG-MCNC: 4.3 G/DL (ref 3.5–5.2)
ALBUMIN UR-MCNC: 30 MG/DL
ALP SERPL-CCNC: 77 U/L (ref 40–150)
ALT SERPL W P-5'-P-CCNC: 131 U/L (ref 0–50)
ANION GAP SERPL CALCULATED.3IONS-SCNC: 14 MMOL/L (ref 7–15)
APPEARANCE UR: ABNORMAL
AST SERPL W P-5'-P-CCNC: 19 U/L (ref 0–45)
BACTERIA #/AREA URNS HPF: ABNORMAL /HPF
BASOPHILS # BLD AUTO: 0.1 10E3/UL (ref 0–0.2)
BASOPHILS NFR BLD AUTO: 1 %
BILIRUB SERPL-MCNC: 0.7 MG/DL
BILIRUB UR QL STRIP: NEGATIVE
BUN SERPL-MCNC: 10.8 MG/DL (ref 6–20)
CALCIUM SERPL-MCNC: 9.8 MG/DL (ref 8.8–10.4)
CHLORIDE SERPL-SCNC: 98 MMOL/L (ref 98–107)
COLOR UR AUTO: YELLOW
CREAT SERPL-MCNC: 0.92 MG/DL (ref 0.51–0.95)
EGFRCR SERPLBLD CKD-EPI 2021: 79 ML/MIN/1.73M2
EOSINOPHIL # BLD AUTO: 0 10E3/UL (ref 0–0.7)
EOSINOPHIL NFR BLD AUTO: 0 %
ERYTHROCYTE [DISTWIDTH] IN BLOOD BY AUTOMATED COUNT: 12.4 % (ref 10–15)
GLUCOSE SERPL-MCNC: 106 MG/DL (ref 70–99)
GLUCOSE UR STRIP-MCNC: NEGATIVE MG/DL
HCG SERPL QL: NEGATIVE
HCO3 SERPL-SCNC: 26 MMOL/L (ref 22–29)
HCT VFR BLD AUTO: 39.5 % (ref 35–47)
HGB BLD-MCNC: 13.2 G/DL (ref 11.7–15.7)
HGB UR QL STRIP: ABNORMAL
HYALINE CASTS: 76 /LPF
IMM GRANULOCYTES # BLD: 0.1 10E3/UL
IMM GRANULOCYTES NFR BLD: 1 %
KETONES UR STRIP-MCNC: 10 MG/DL
LACTATE SERPL-SCNC: 0.5 MMOL/L (ref 0.7–2)
LEUKOCYTE ESTERASE UR QL STRIP: ABNORMAL
LIPASE SERPL-CCNC: 43 U/L (ref 13–60)
LYMPHOCYTES # BLD AUTO: 1.5 10E3/UL (ref 0.8–5.3)
LYMPHOCYTES NFR BLD AUTO: 15 %
MCH RBC QN AUTO: 29.2 PG (ref 26.5–33)
MCHC RBC AUTO-ENTMCNC: 33.4 G/DL (ref 31.5–36.5)
MCV RBC AUTO: 87 FL (ref 78–100)
MONOCYTES # BLD AUTO: 0.9 10E3/UL (ref 0–1.3)
MONOCYTES NFR BLD AUTO: 9 %
MUCOUS THREADS #/AREA URNS LPF: PRESENT /LPF
NEUTROPHILS # BLD AUTO: 7.4 10E3/UL (ref 1.6–8.3)
NEUTROPHILS NFR BLD AUTO: 74 %
NITRATE UR QL: NEGATIVE
NRBC # BLD AUTO: 0 10E3/UL
NRBC BLD AUTO-RTO: 0 /100
PH UR STRIP: 5.5 [PH] (ref 5–7)
PLATELET # BLD AUTO: 439 10E3/UL (ref 150–450)
POTASSIUM SERPL-SCNC: 3.5 MMOL/L (ref 3.4–5.3)
PROT SERPL-MCNC: 7.6 G/DL (ref 6.4–8.3)
RBC # BLD AUTO: 4.52 10E6/UL (ref 3.8–5.2)
RBC URINE: 5 /HPF
SODIUM SERPL-SCNC: 138 MMOL/L (ref 135–145)
SP GR UR STRIP: 1.03 (ref 1–1.03)
SQUAMOUS EPITHELIAL: 3 /HPF
UROBILINOGEN UR STRIP-MCNC: <2 MG/DL
WBC # BLD AUTO: 10 10E3/UL (ref 4–11)
WBC URINE: 6 /HPF

## 2024-11-02 PROCEDURE — 36415 COLL VENOUS BLD VENIPUNCTURE: CPT | Performed by: EMERGENCY MEDICINE

## 2024-11-02 PROCEDURE — 96376 TX/PRO/DX INJ SAME DRUG ADON: CPT

## 2024-11-02 PROCEDURE — 81001 URINALYSIS AUTO W/SCOPE: CPT | Performed by: EMERGENCY MEDICINE

## 2024-11-02 PROCEDURE — 85025 COMPLETE CBC W/AUTO DIFF WBC: CPT | Performed by: EMERGENCY MEDICINE

## 2024-11-02 PROCEDURE — 96374 THER/PROPH/DIAG INJ IV PUSH: CPT | Mod: 59

## 2024-11-02 PROCEDURE — 84155 ASSAY OF PROTEIN SERUM: CPT | Performed by: EMERGENCY MEDICINE

## 2024-11-02 PROCEDURE — 83690 ASSAY OF LIPASE: CPT | Performed by: EMERGENCY MEDICINE

## 2024-11-02 PROCEDURE — 74177 CT ABD & PELVIS W/CONTRAST: CPT

## 2024-11-02 PROCEDURE — 84145 PROCALCITONIN (PCT): CPT | Performed by: STUDENT IN AN ORGANIZED HEALTH CARE EDUCATION/TRAINING PROGRAM

## 2024-11-02 PROCEDURE — 96375 TX/PRO/DX INJ NEW DRUG ADDON: CPT

## 2024-11-02 PROCEDURE — 250N000011 HC RX IP 250 OP 636: Performed by: EMERGENCY MEDICINE

## 2024-11-02 PROCEDURE — 120N000001 HC R&B MED SURG/OB

## 2024-11-02 PROCEDURE — 84703 CHORIONIC GONADOTROPIN ASSAY: CPT | Performed by: EMERGENCY MEDICINE

## 2024-11-02 PROCEDURE — 99222 1ST HOSP IP/OBS MODERATE 55: CPT | Performed by: STUDENT IN AN ORGANIZED HEALTH CARE EDUCATION/TRAINING PROGRAM

## 2024-11-02 PROCEDURE — 99285 EMERGENCY DEPT VISIT HI MDM: CPT | Mod: 25

## 2024-11-02 PROCEDURE — 87086 URINE CULTURE/COLONY COUNT: CPT | Performed by: STUDENT IN AN ORGANIZED HEALTH CARE EDUCATION/TRAINING PROGRAM

## 2024-11-02 PROCEDURE — 83605 ASSAY OF LACTIC ACID: CPT | Performed by: EMERGENCY MEDICINE

## 2024-11-02 RX ORDER — PROCHLORPERAZINE MALEATE 5 MG/1
5 TABLET ORAL EVERY 6 HOURS PRN
Status: DISCONTINUED | OUTPATIENT
Start: 2024-11-02 | End: 2024-11-04 | Stop reason: HOSPADM

## 2024-11-02 RX ORDER — ALPRAZOLAM 0.5 MG
0.5 TABLET ORAL
Status: DISCONTINUED | OUTPATIENT
Start: 2024-11-02 | End: 2024-11-04 | Stop reason: HOSPADM

## 2024-11-02 RX ORDER — KETOROLAC TROMETHAMINE 15 MG/ML
15 INJECTION, SOLUTION INTRAMUSCULAR; INTRAVENOUS ONCE
Status: COMPLETED | OUTPATIENT
Start: 2024-11-02 | End: 2024-11-02

## 2024-11-02 RX ORDER — ONDANSETRON 4 MG/1
4 TABLET, ORALLY DISINTEGRATING ORAL EVERY 8 HOURS PRN
Status: DISCONTINUED | OUTPATIENT
Start: 2024-11-02 | End: 2024-11-04 | Stop reason: HOSPADM

## 2024-11-02 RX ORDER — LIDOCAINE 40 MG/G
CREAM TOPICAL
Status: DISCONTINUED | OUTPATIENT
Start: 2024-11-02 | End: 2024-11-04 | Stop reason: HOSPADM

## 2024-11-02 RX ORDER — AMLODIPINE BESYLATE 5 MG/1
10 TABLET ORAL EVERY EVENING
Status: DISCONTINUED | OUTPATIENT
Start: 2024-11-03 | End: 2024-11-04 | Stop reason: HOSPADM

## 2024-11-02 RX ORDER — ALPRAZOLAM 0.5 MG
0.5 TABLET ORAL
COMMUNITY

## 2024-11-02 RX ORDER — PROCHLORPERAZINE MALEATE 5 MG/1
5 TABLET ORAL EVERY 6 HOURS PRN
COMMUNITY

## 2024-11-02 RX ORDER — HEPARIN SODIUM 10000 [USP'U]/100ML
0-5000 INJECTION, SOLUTION INTRAVENOUS CONTINUOUS
Status: DISCONTINUED | OUTPATIENT
Start: 2024-11-02 | End: 2024-11-03 | Stop reason: DRUGHIGH

## 2024-11-02 RX ORDER — VENLAFAXINE HYDROCHLORIDE 75 MG/1
150 CAPSULE, EXTENDED RELEASE ORAL EVERY EVENING
Status: DISCONTINUED | OUTPATIENT
Start: 2024-11-03 | End: 2024-11-04 | Stop reason: HOSPADM

## 2024-11-02 RX ORDER — AMOXICILLIN 250 MG
2 CAPSULE ORAL 2 TIMES DAILY PRN
Status: DISCONTINUED | OUTPATIENT
Start: 2024-11-02 | End: 2024-11-04 | Stop reason: HOSPADM

## 2024-11-02 RX ORDER — CALCIUM CARBONATE 500 MG/1
1000 TABLET, CHEWABLE ORAL 4 TIMES DAILY PRN
Status: DISCONTINUED | OUTPATIENT
Start: 2024-11-02 | End: 2024-11-04 | Stop reason: HOSPADM

## 2024-11-02 RX ORDER — ESZOPICLONE 1 MG/1
3 TABLET, FILM COATED ORAL AT BEDTIME
Status: DISCONTINUED | OUTPATIENT
Start: 2024-11-03 | End: 2024-11-04 | Stop reason: HOSPADM

## 2024-11-02 RX ORDER — IOPAMIDOL 755 MG/ML
90 INJECTION, SOLUTION INTRAVASCULAR ONCE
Status: COMPLETED | OUTPATIENT
Start: 2024-11-02 | End: 2024-11-02

## 2024-11-02 RX ORDER — HYDROMORPHONE HYDROCHLORIDE 2 MG/1
2 TABLET ORAL EVERY 6 HOURS PRN
Status: ON HOLD | COMMUNITY
End: 2024-11-04

## 2024-11-02 RX ORDER — AMOXICILLIN 250 MG
1 CAPSULE ORAL 2 TIMES DAILY PRN
Status: DISCONTINUED | OUTPATIENT
Start: 2024-11-02 | End: 2024-11-04 | Stop reason: HOSPADM

## 2024-11-02 RX ORDER — IBUPROFEN 200 MG
400 TABLET ORAL EVERY 6 HOURS PRN
Status: ON HOLD | COMMUNITY
End: 2024-11-04

## 2024-11-02 RX ORDER — LOSARTAN POTASSIUM 50 MG/1
50 TABLET ORAL EVERY EVENING
Status: DISCONTINUED | OUTPATIENT
Start: 2024-11-03 | End: 2024-11-04 | Stop reason: HOSPADM

## 2024-11-02 RX ADMIN — IOPAMIDOL 90 ML: 755 INJECTION, SOLUTION INTRAVENOUS at 20:16

## 2024-11-02 RX ADMIN — HYDROMORPHONE HYDROCHLORIDE 1 MG: 1 INJECTION, SOLUTION INTRAMUSCULAR; INTRAVENOUS; SUBCUTANEOUS at 20:34

## 2024-11-02 RX ADMIN — HYDROMORPHONE HYDROCHLORIDE 1 MG: 1 INJECTION, SOLUTION INTRAMUSCULAR; INTRAVENOUS; SUBCUTANEOUS at 22:35

## 2024-11-02 RX ADMIN — KETOROLAC TROMETHAMINE 15 MG: 15 INJECTION, SOLUTION INTRAMUSCULAR; INTRAVENOUS at 19:19

## 2024-11-02 RX ADMIN — HEPARIN SODIUM 1000 UNITS/HR: 10000 INJECTION, SOLUTION INTRAVENOUS at 22:14

## 2024-11-02 ASSESSMENT — COLUMBIA-SUICIDE SEVERITY RATING SCALE - C-SSRS
6. HAVE YOU EVER DONE ANYTHING, STARTED TO DO ANYTHING, OR PREPARED TO DO ANYTHING TO END YOUR LIFE?: NO
1. IN THE PAST MONTH, HAVE YOU WISHED YOU WERE DEAD OR WISHED YOU COULD GO TO SLEEP AND NOT WAKE UP?: NO
2. HAVE YOU ACTUALLY HAD ANY THOUGHTS OF KILLING YOURSELF IN THE PAST MONTH?: NO

## 2024-11-02 ASSESSMENT — ACTIVITIES OF DAILY LIVING (ADL)
ADLS_ACUITY_SCORE: 0

## 2024-11-02 NOTE — ED NOTES
"Pt reports abd pain nausea S/P colon surgery at the [facility] 9 days ago. \"They removed part of my colon\" she reports small liquid BM today. She requests that ED staff not place an IV as she has \"a blood clot where the last IV was\"   "

## 2024-11-02 NOTE — ED TRIAGE NOTES
Colon removed 9d ago, has been increasing diet. Had ileus while in hospital. Had first real meal yesterday and has felt similar to ileus. Only sips of water since then. Passing small amounts of stool and gas. Fever, nausea, ABD pain. Also has a known clot from US on Wednesday in vein where she had the IV in the hospital. Area is reddened and warm to touch. Took 1g tylenol about 1.5hr ago.

## 2024-11-02 NOTE — ED PROVIDER NOTES
EMERGENCY DEPARTMENT ENCOUNTER      NAME: Anisa Love  AGE: 44 year old female  YOB: 1980  MRN: [id number]  EVALUATION DATE & TIME: 11/2/2024  6:18 PM    PCP: Tiffani Cassidy    ED PROVIDER: Alex Bran M.D.      Chief Complaint   Patient presents with    Abdominal Pain     Post op. Possible cellulitis on left forearm.          FINAL IMPRESSION:  1. Portal vein thrombosis          ED COURSE & MEDICAL DECISION MAKING:    Pertinent Labs & Imaging studies reviewed. (See chart for details)  44 year old female presents to the Emergency Department for evaluation of abdominal pain. Patient appears non toxic with stable vitals signs, patient is afebrile with no tachycardia or hypoxia.  Lungs are clear and abdomen is benign.  Per review the medical record, did review progress note on 10/27/2024 through [facility], colorectal surgery resident, patient was postop day 5 from subtotal colectomy complicated by ileus.  Patient has slowly been advancing her diet at home and otherwise doing well this week until a few days ago when this burning upper abdominal tenderness persisted, endorses fever today though afebrile here.  Certainly concern for obstruction, less likely perforation, concern for ileitis, intra-abdominal fluid or abscess, no chest or pulmonary complaints and lung sounds are clear with nothing to suggest pneumonia, considered cystitis though she denies urinary symptoms.  Considered chest x-ray though again no cough, shortness of breath, lung sounds are clear with nothing to suggest pneumonia.  We will obtain screening labs, urine studies and CT imaging the abdomen pelvis.  Patient was given Toradol for pain.  Of note, patient states that she was recently diagnosed with superficial thrombophlebitis secondary to IV site on her left arm, this appears benign with no streaking erythema or warmth.  Being treated conservatively per her report.    6:43 PM l exam, and discussed options and plan  for diagnostics and treatment here in the ED.  9:07 PM Spoke to radiology, Dr. Vance.  9:54 PM Spoke to colorectal, Dr. Hare. Updated the patient on findings and need for admission.   10:36 PM Spoke to hospitalist, Dr. Isaacs, who accepted patient for admission.    Reassessment: Labs by my independent interpretation showed no signs of acute kidney injury with a creatinine of 0.92, no signs of anemia with a hemoglobin of 13.2.  Pregnancy negative.  Urine showed leukocytes and blood negative nitrates, patient denies any urinary symptoms, we will send for culture and treat if positive.  CT imaging returned and by my independent interpretation showed no free air, by report showed inferior mesenteric, splenic, right portal and extensive intrahepatic portal vein branch thrombosis.  Discussed patient case with colorectal surgery, no recommendations for surgical intervention, recommended medical management.  Patient was started on heparin infusion and will be admitted for continued management and care.  Discussed these findings recommendations with the patient.  Discussed care plan to admitting service.    Medical Decision Making  Obtained supplemental history:Supplemental history obtained?: No  Reviewed external records: External records reviewed?: Documented in chart  Care impacted by chronic illness:Hypertension  Did you consider but not order tests?: Work up considered but not performed and documented in chart, if applicable  Did you interpret images independently?: Independent interpretation of ECG and images noted in documentation, when applicable.  Consultation discussion with other provider:Did you involve another provider (consultant, , pharmacy, etc.)?: I discussed the care with another health care provider, see documentation for details.  Admit.    MIPS: Not Applicable        At the conclusion of the encounter I discussed the results of all of the tests and the disposition. The questions were answered and  return precautions provided. The patient or family acknowledged understanding and was agreeable with the care plan.         MEDICATIONS GIVEN IN THE EMERGENCY:  Medications   heparin 25,000 units in 0.45% NaCl 250 mL ANTICOAGULANT infusion (1,000 Units/hr Intravenous Rate/Dose Verify 11/2/24 2215)   lidocaine 1 % 0.1-1 mL (has no administration in time range)   lidocaine (LMX4) cream (has no administration in time range)   sodium chloride (PF) 0.9% PF flush 3 mL (has no administration in time range)   sodium chloride (PF) 0.9% PF flush 3 mL (has no administration in time range)   senna-docusate (SENOKOT-S/PERICOLACE) 8.6-50 MG per tablet 1 tablet (has no administration in time range)     Or   senna-docusate (SENOKOT-S/PERICOLACE) 8.6-50 MG per tablet 2 tablet (has no administration in time range)   calcium carbonate (TUMS) chewable tablet 1,000 mg (has no administration in time range)   Patient is already receiving anticoagulation with heparin, enoxaparin (LOVENOX), warfarin (COUMADIN)  or other anticoagulant medication (has no administration in time range)   ketorolac (TORADOL) injection 15 mg (15 mg Intravenous $Given 11/2/24 1919)   iopamidol (ISOVUE-370) solution 90 mL (90 mLs Intravenous $Given 11/2/24 2016)   HYDROmorphone (DILAUDID) injection 1 mg (1 mg Intravenous $Given 11/2/24 2034)   heparin loading dose for LOW INTENSITY TREATMENT * Give BEFORE starting heparin infusion (5,100 Units Intravenous Bolus from Bag 11/2/24 2215)   HYDROmorphone (DILAUDID) injection 1 mg (1 mg Intravenous $Given 11/2/24 2235)       NEW PRESCRIPTIONS STARTED AT TODAY'S ER VISIT  New Prescriptions    No medications on file            =================================================================    HPI    Patient information was obtained from: patient     Use of Intrepreter: N/A         Anisa Love is a 44 year old female who presents by walk in for evaluation of post-operative problem.     Per chart review, on 10/22/24,  patient underwent laparoscopic subtotal colectomy with colorectal surgery at [facility]. Admitted after procedure for pain management, complicated by ileus. At discharge on 10/28/24, she was tolerating a regular diet, passing gas and stool.     On 10/22/24 (10 days ago), the patient was seen at [facility] to undergo a total colectomy, connecting her small intestine directly to her rectum, due to history of redundant colon and transit issues causing pain and discomfort. She was admitted for monitoring and developed an ileus. She was discharged on 10/28 (5 days ago). Until 10/31, patient reports she was starting to return to baseline. States she was progressing her diet and weaning off Dilaudid. The evening of 10/31 (2 days ago), she developed achy, burning upper abdominal pain that has persisted since. On 11/1 (yesterday), she was able to maintain regular diet, ate a wrap and cheese bread for lunch but does not feel that contributed to her pain. Around 1900 yesterday, she took a dose of Dilaudid, but it did not provide any relief. Today, she reports progressively worsening pain, difficulty breathing secondary to pain, bloating, and small amounts of stool and flatus, decreased from a few days ago. Around 1630 today she noted a fever of 101 F which she took Tylenol for. Also endorses pelvic fullness but unsure if this was related to her menstrual cycle. Patient called her colorectal surgeon who recommended she go to [place] ED.     Of note, on 10/30 (3 days ago), the patient developed left forearm erythema and warmth. Had an ultrasound and found to have a superficial clot. She was given abx by her PCP. Followed up with her PCP on 11/1.     S/p cholecystectomy. No appendectomy. Denies cough, nausea, vomiting, dysuria, hematuria, chest pain, or any other complaints at this time.      REVIEW OF SYSTEMS   ROS negative other than the symptoms noted above in the HPI.      PAST MEDICAL HISTORY:  Past Medical History:    Diagnosis Date    Depression     GERD (gastroesophageal reflux disease)     Gonorrhea     HPV (human papilloma virus) anogenital infection     Hypertension     Mental disorder     Motion sickness     Noninfectious ileitis     Other chronic pain 2023    PONV (postoperative nausea and vomiting)     Sleep apnea     Slight does not use a CPAP    TMJ (temporomandibular joint syndrome)     Pt is able to put 3 fingers stacked in mouth    Ulcerative colitis (H)     Venous thromboembolism 2024       PAST SURGICAL HISTORY:  Past Surgical History:   Procedure Laterality Date     SECTION N/A 2019    Procedure:  SECTION;  Surgeon: Magi Ayala MD;  Location: [facility] OR;  Service: Obstetrics    HYSTEROSCOPY, WITH ENDOMETRIAL RADIOFREQUENCY ABLATION - NOVASURE  2023    Procedure: HYSTEROSCOPY with DILATION AND CURETTAGE, DIAGNOSTIC LAPAROSCOPY, BILATERAL LAPAROSCOPIC SALPINGECTOMY;  Surgeon: Anitha Archuleta DO;  Location: [facility] OR    HYSTEROSCOPY, WITH ENDOMETRIAL RADIOFREQUENCY ABLATION - NOVASURE N/A 3/8/2024    Procedure: HYSTEROSCOPY, WITH ENDOMETRIAL ABLATION;  Surgeon: Anitha Archuleta DO;  Location: [facility] OR    LAPAROSCOPIC CHOLECYSTECTOMY  2019    LAPAROSCOPY DIAGNOSTIC (GYN) N/A 8/3/2022    Procedure: DIAGNOSTIC LAPAROSCOPY, excision of endometrial cysts and cauterizaton of endometrial cysts;  Surgeon: Minerva York MD;  Location: [facility] OR    WISDOM TOOTH EXTRACTION           CURRENT MEDICATIONS:    Prior to Admission medications    Medication Sig Start Date End Date Taking? Authorizing Provider   acyclovir (ZOVIRAX) 400 MG tablet Take 400 mg by mouth 3 times daily as needed    Reported, Patient   amLODIPine (NORVASC) 5 MG tablet Take 10 mg by mouth daily    Tiffani Cassidy MD   eszopiclone (LUNESTA) 2 MG tablet Take 2 mg by mouth at bedtime    Reported, Patient   HYDROcodone-acetaminophen (NORCO) 5-325 MG tablet Take  1-2 tablets by mouth every 4 hours as needed for moderate to severe pain 3/8/24   Anitha Archuleta DO   ibuprofen (ADVIL/MOTRIN) 800 MG tablet Take 1 tablet (800 mg) by mouth every 6 hours as needed for other (mild and/or inflammatory pain) 3/8/24   Anitha Archuleta DO   linaclotide (LINZESS) 145 MCG capsule Take 145 mcg by mouth every morning (before breakfast)    Reported, Patient   losartan (COZAAR) 50 MG tablet Take 50 mg by mouth daily    Tiffani Cassidy MD   ondansetron (ZOFRAN ODT) 4 MG ODT tab Take 1 tablet (4 mg) by mouth every 8 hours as needed for nausea 3/8/24   Anitha Archuleta DO   venlafaxine (EFFEXOR XR) 75 MG 24 hr capsule Take 75 mg by mouth daily    Reported, Patient   venlafaxine (EFFEXOR) 37.5 MG tablet Take 37.5 mg by mouth daily    Reported, Patient        ALLERGIES:  No Known Allergies    FAMILY HISTORY:  History reviewed. No pertinent family history.    SOCIAL HISTORY:   Social History     Socioeconomic History    Marital status: Single   Tobacco Use    Smoking status: Some Days     Current packs/day: 0.20     Types: Cigarettes    Smokeless tobacco: Never   Substance and Sexual Activity    Alcohol use: Yes     Comment: Occasional    Drug use: Yes     Types: Marijuana     Social Drivers of Health     Food Insecurity: No Food Insecurity (10/22/2024)    Received from [facility]    Hunger Vital Sign     Worried About Running Out of Food in the Last Year: Never true     Ran Out of Food in the Last Year: Never true   Transportation Needs: No Transportation Needs (10/22/2024)    Received from [facility]    PRAPARE - Transportation     Lack of Transportation (Medical): No     Lack of Transportation (Non-Medical): No   Physical Activity: Insufficiently Active (5/1/2024)    Received from [facility]    Exercise Vital Sign     Days of Exercise per Week: 1 day     Minutes of Exercise per Session: 30 min   Interpersonal Safety: Not At Risk (10/22/2024)    Received from [facility]     "Humiliation, Afraid, Rape, and Kick questionnaire     Fear of Current or Ex-Partner: No     Emotionally Abused: No     Physically Abused: No     Sexually Abused: No   Housing Stability: Low Risk  (10/22/2024)    Received from [facility]    Housing Stability     What is your living situation today?: I have a steady place to live       VITALS:  Patient Vitals for the past 24 hrs:   BP Temp Temp src Pulse Resp SpO2 Height Weight   11/02/24 2131 115/78 -- -- 83 -- 97 % -- --   11/02/24 2116 119/72 -- -- 88 -- 97 % -- --   11/02/24 2101 118/67 -- -- 80 -- 95 % -- --   11/02/24 2046 131/69 -- -- 79 -- 98 % -- --   11/02/24 2031 126/75 -- -- 78 -- 98 % -- --   11/02/24 1951 (!) 150/86 -- -- -- -- -- -- --   11/02/24 1950 -- -- -- -- -- 96 % -- --   11/02/24 1824 131/83 97.9  F (36.6  C) Temporal 105 18 98 % 1.626 m (5' 4\") 84.8 kg (187 lb)        PHYSICAL EXAM    Constitutional:  Awake, alert, in no apparent distress  HENT:  Normocephalic, Atraumatic. Bilateral external ears normal. Oropharynx moist. Nose normal. Neck- Normal range of motion with no guarding, No midline cervical tenderness, Supple, No stridor.   Eyes:  PERRL, EOMI with no signs of entrapment, Conjunctiva normal, No discharge.   Respiratory:  Normal breath sounds, No respiratory distress, No wheezing.    Cardiovascular: Mild tachycardia, Normal rhythm, No appreciable rubs or gallops.   GI:  Soft, generalized upper abdominal tenderness without rigidity or distention, surgical sites over the abdomen appear clean and well-healed without surrounding erythema, no drainage or discharge.  Musculoskeletal:  Intact distal pulses, No edema. Good range of motion in all major joints. No tenderness to palpation or major deformities noted.  Integument:  Warm, Dry, No erythema, No rash.   Neurologic:  Alert & oriented, Normal motor function, Normal sensory function, No focal deficits noted.   Psychiatric:  Affect normal, Judgment normal, Mood normal.     LAB:  All " pertinent labs reviewed and interpreted.  Results for orders placed or performed during the hospital encounter of 11/02/24   CT Abdomen Pelvis w Contrast    Impression    IMPRESSION:   1.  Inferior mesenteric, splenic, right portal, and extensive intrahepatic portal vein branch thrombosis as detailed above.  2.  Status post interval colectomy, with subcutaneous fluid collection likely representing a hematoma/seroma.  3.  Additional findings as above.    Venous thrombosis was called to Dr. Alex Bran by Dr. Montrell Vance on 11/2/2024 9:01 PM CDT.   Comprehensive metabolic panel   Result Value Ref Range    Sodium 138 135 - 145 mmol/L    Potassium 3.5 3.4 - 5.3 mmol/L    Carbon Dioxide (CO2) 26 22 - 29 mmol/L    Anion Gap 14 7 - 15 mmol/L    Urea Nitrogen 10.8 6.0 - 20.0 mg/dL    Creatinine 0.92 0.51 - 0.95 mg/dL    GFR Estimate 79 >60 mL/min/1.73m2    Calcium 9.8 8.8 - 10.4 mg/dL    Chloride 98 98 - 107 mmol/L    Glucose 106 (H) 70 - 99 mg/dL    Alkaline Phosphatase 77 40 - 150 U/L    AST 19 0 - 45 U/L     (H) 0 - 50 U/L    Protein Total 7.6 6.4 - 8.3 g/dL    Albumin 4.3 3.5 - 5.2 g/dL    Bilirubin Total 0.7 <=1.2 mg/dL   Result Value Ref Range    Lipase 43 13 - 60 U/L   HCG qualitative Blood   Result Value Ref Range    hCG Serum Qualitative Negative Negative   UA with Microscopic reflex to Culture    Specimen: Urine, Midstream   Result Value Ref Range    Color Urine Yellow Colorless, Straw, Light Yellow, Yellow    Appearance Urine Turbid (A) Clear    Glucose Urine Negative Negative mg/dL    Bilirubin Urine Negative Negative    Ketones Urine 10 (A) Negative mg/dL    Specific Gravity Urine 1.031 (H) 1.001 - 1.030    Blood Urine 0.2 mg/dL (A) Negative    pH Urine 5.5 5.0 - 7.0    Protein Albumin Urine 30 (A) Negative mg/dL    Urobilinogen Urine <2.0 <2.0 mg/dL    Nitrite Urine Negative Negative    Leukocyte Esterase Urine 25 Narda/uL (A) Negative    Bacteria Urine Few (A) None Seen /HPF    Mucus Urine  Present (A) None Seen /LPF    RBC Urine 5 (H) <=2 /HPF    WBC Urine 6 (H) <=5 /HPF    Squamous Epithelials Urine 3 (H) <=1 /HPF    Hyaline Casts Urine 76 (H) <=2 /LPF   CBC with platelets and differential   Result Value Ref Range    WBC Count 10.0 4.0 - 11.0 10e3/uL    RBC Count 4.52 3.80 - 5.20 10e6/uL    Hemoglobin 13.2 11.7 - 15.7 g/dL    Hematocrit 39.5 35.0 - 47.0 %    MCV 87 78 - 100 fL    MCH 29.2 26.5 - 33.0 pg    MCHC 33.4 31.5 - 36.5 g/dL    RDW 12.4 10.0 - 15.0 %    Platelet Count 439 150 - 450 10e3/uL    % Neutrophils 74 %    % Lymphocytes 15 %    % Monocytes 9 %    % Eosinophils 0 %    % Basophils 1 %    % Immature Granulocytes 1 %    NRBCs per 100 WBC 0 <1 /100    Absolute Neutrophils 7.4 1.6 - 8.3 10e3/uL    Absolute Lymphocytes 1.5 0.8 - 5.3 10e3/uL    Absolute Monocytes 0.9 0.0 - 1.3 10e3/uL    Absolute Eosinophils 0.0 0.0 - 0.7 10e3/uL    Absolute Basophils 0.1 0.0 - 0.2 10e3/uL    Absolute Immature Granulocytes 0.1 <=0.4 10e3/uL    Absolute NRBCs 0.0 10e3/uL   Lactic acid whole blood with 1x repeat in 2 hr when >2   Result Value Ref Range    Lactic Acid, Initial 0.5 (L) 0.7 - 2.0 mmol/L       RADIOLOGY:  CT Abdomen Pelvis w Contrast   Final Result   IMPRESSION:    1.  Inferior mesenteric, splenic, right portal, and extensive intrahepatic portal vein branch thrombosis as detailed above.   2.  Status post interval colectomy, with subcutaneous fluid collection likely representing a hematoma/seroma.   3.  Additional findings as above.      Venous thrombosis was called to Dr. Alex Bran by Dr. Montrell Vance on 11/2/2024 9:01 PM CDT.             EKG:      I have independently reviewed and interpreted the EKG(s) documented above.    PROCEDURES:        [employer] SOFTWARE System Documentation:   CMS Diagnoses:       I, Erin Shin, am serving as a scribe to document services personally performed by Alex Bran MD, based on my observation and the provider's statements to me. I, Alex  MD Yina attest that Erin Shin is acting in a scribe capacity, has observed my performance of the services and has documented them in accordance with my direction.    Alex Bran M.D.  Emergency Medicine  [facility] EMERGENCY DEPARTMENT  [address] [place] [id number] [phone]  Dept: [phone]       Alex Bran MD  11/02/24 2314

## 2024-11-03 ENCOUNTER — APPOINTMENT (OUTPATIENT)
Dept: ULTRASOUND IMAGING | Facility: HOSPITAL | Age: 44
DRG: 299 | End: 2024-11-03
Attending: INTERNAL MEDICINE
Payer: COMMERCIAL

## 2024-11-03 PROBLEM — I80.8 THROMBOPHLEBITIS OF ARM, LEFT: Status: ACTIVE | Noted: 2024-11-03

## 2024-11-03 PROBLEM — I10 BENIGN ESSENTIAL HYPERTENSION: Status: ACTIVE | Noted: 2021-11-22

## 2024-11-03 LAB
ANION GAP SERPL CALCULATED.3IONS-SCNC: 13 MMOL/L (ref 7–15)
BUN SERPL-MCNC: 9.1 MG/DL (ref 6–20)
CALCIUM SERPL-MCNC: 9.1 MG/DL (ref 8.8–10.4)
CHLORIDE SERPL-SCNC: 99 MMOL/L (ref 98–107)
CREAT SERPL-MCNC: 0.67 MG/DL (ref 0.51–0.95)
EGFRCR SERPLBLD CKD-EPI 2021: >90 ML/MIN/1.73M2
ERYTHROCYTE [DISTWIDTH] IN BLOOD BY AUTOMATED COUNT: 12.2 % (ref 10–15)
ERYTHROCYTE [DISTWIDTH] IN BLOOD BY AUTOMATED COUNT: 12.3 % (ref 10–15)
GLUCOSE SERPL-MCNC: 105 MG/DL (ref 70–99)
HCO3 SERPL-SCNC: 26 MMOL/L (ref 22–29)
HCT VFR BLD AUTO: 36.1 % (ref 35–47)
HCT VFR BLD AUTO: 36.9 % (ref 35–47)
HGB BLD-MCNC: 12.1 G/DL (ref 11.7–15.7)
HGB BLD-MCNC: 12.5 G/DL (ref 11.7–15.7)
MCH RBC QN AUTO: 29.4 PG (ref 26.5–33)
MCH RBC QN AUTO: 29.6 PG (ref 26.5–33)
MCHC RBC AUTO-ENTMCNC: 33.5 G/DL (ref 31.5–36.5)
MCHC RBC AUTO-ENTMCNC: 33.9 G/DL (ref 31.5–36.5)
MCV RBC AUTO: 87 FL (ref 78–100)
MCV RBC AUTO: 88 FL (ref 78–100)
PLATELET # BLD AUTO: 383 10E3/UL (ref 150–450)
PLATELET # BLD AUTO: 393 10E3/UL (ref 150–450)
POTASSIUM SERPL-SCNC: 3.6 MMOL/L (ref 3.4–5.3)
PROCALCITONIN SERPL IA-MCNC: 0.08 NG/ML
RBC # BLD AUTO: 4.12 10E6/UL (ref 3.8–5.2)
RBC # BLD AUTO: 4.23 10E6/UL (ref 3.8–5.2)
SODIUM SERPL-SCNC: 138 MMOL/L (ref 135–145)
UFH PPP CHRO-ACNC: 0.37 IU/ML
UFH PPP CHRO-ACNC: 0.71 IU/ML
WBC # BLD AUTO: 7.7 10E3/UL (ref 4–11)
WBC # BLD AUTO: 7.8 10E3/UL (ref 4–11)

## 2024-11-03 PROCEDURE — 80048 BASIC METABOLIC PNL TOTAL CA: CPT | Performed by: STUDENT IN AN ORGANIZED HEALTH CARE EDUCATION/TRAINING PROGRAM

## 2024-11-03 PROCEDURE — 85520 HEPARIN ASSAY: CPT | Performed by: STUDENT IN AN ORGANIZED HEALTH CARE EDUCATION/TRAINING PROGRAM

## 2024-11-03 PROCEDURE — 250N000011 HC RX IP 250 OP 636: Performed by: INTERNAL MEDICINE

## 2024-11-03 PROCEDURE — 85027 COMPLETE CBC AUTOMATED: CPT | Performed by: STUDENT IN AN ORGANIZED HEALTH CARE EDUCATION/TRAINING PROGRAM

## 2024-11-03 PROCEDURE — 999N000157 HC STATISTIC RCP TIME EA 10 MIN

## 2024-11-03 PROCEDURE — 250N000011 HC RX IP 250 OP 636: Performed by: STUDENT IN AN ORGANIZED HEALTH CARE EDUCATION/TRAINING PROGRAM

## 2024-11-03 PROCEDURE — 85520 HEPARIN ASSAY: CPT | Performed by: INTERNAL MEDICINE

## 2024-11-03 PROCEDURE — 93971 EXTREMITY STUDY: CPT | Mod: LT

## 2024-11-03 PROCEDURE — 250N000013 HC RX MED GY IP 250 OP 250 PS 637: Performed by: INTERNAL MEDICINE

## 2024-11-03 PROCEDURE — 99232 SBSQ HOSP IP/OBS MODERATE 35: CPT | Performed by: INTERNAL MEDICINE

## 2024-11-03 PROCEDURE — 99223 1ST HOSP IP/OBS HIGH 75: CPT | Performed by: INTERNAL MEDICINE

## 2024-11-03 PROCEDURE — 36415 COLL VENOUS BLD VENIPUNCTURE: CPT | Performed by: INTERNAL MEDICINE

## 2024-11-03 PROCEDURE — 250N000013 HC RX MED GY IP 250 OP 250 PS 637: Performed by: STUDENT IN AN ORGANIZED HEALTH CARE EDUCATION/TRAINING PROGRAM

## 2024-11-03 PROCEDURE — 120N000001 HC R&B MED SURG/OB

## 2024-11-03 PROCEDURE — 36415 COLL VENOUS BLD VENIPUNCTURE: CPT | Performed by: STUDENT IN AN ORGANIZED HEALTH CARE EDUCATION/TRAINING PROGRAM

## 2024-11-03 RX ORDER — OXYCODONE HYDROCHLORIDE 5 MG/1
5 TABLET ORAL EVERY 4 HOURS PRN
Status: DISCONTINUED | OUTPATIENT
Start: 2024-11-03 | End: 2024-11-03

## 2024-11-03 RX ORDER — OXYCODONE HYDROCHLORIDE 5 MG/1
5 TABLET ORAL EVERY 4 HOURS PRN
Status: DISCONTINUED | OUTPATIENT
Start: 2024-11-03 | End: 2024-11-04

## 2024-11-03 RX ORDER — HYDROMORPHONE HYDROCHLORIDE 1 MG/ML
0.5 INJECTION, SOLUTION INTRAMUSCULAR; INTRAVENOUS; SUBCUTANEOUS
Status: DISCONTINUED | OUTPATIENT
Start: 2024-11-03 | End: 2024-11-04

## 2024-11-03 RX ORDER — ACETAMINOPHEN 325 MG/1
650 TABLET ORAL EVERY 4 HOURS PRN
Status: DISCONTINUED | OUTPATIENT
Start: 2024-11-03 | End: 2024-11-04 | Stop reason: HOSPADM

## 2024-11-03 RX ORDER — NALOXONE HYDROCHLORIDE 0.4 MG/ML
0.2 INJECTION, SOLUTION INTRAMUSCULAR; INTRAVENOUS; SUBCUTANEOUS
Status: DISCONTINUED | OUTPATIENT
Start: 2024-11-03 | End: 2024-11-04 | Stop reason: HOSPADM

## 2024-11-03 RX ORDER — NALOXONE HYDROCHLORIDE 0.4 MG/ML
0.4 INJECTION, SOLUTION INTRAMUSCULAR; INTRAVENOUS; SUBCUTANEOUS
Status: DISCONTINUED | OUTPATIENT
Start: 2024-11-03 | End: 2024-11-04 | Stop reason: HOSPADM

## 2024-11-03 RX ORDER — HEPARIN SODIUM 10000 [USP'U]/100ML
0-5000 INJECTION, SOLUTION INTRAVENOUS CONTINUOUS
Status: DISPENSED | OUTPATIENT
Start: 2024-11-03 | End: 2024-11-04

## 2024-11-03 RX ADMIN — AMLODIPINE BESYLATE 10 MG: 5 TABLET ORAL at 21:05

## 2024-11-03 RX ADMIN — AMLODIPINE BESYLATE 10 MG: 5 TABLET ORAL at 01:07

## 2024-11-03 RX ADMIN — VENLAFAXINE HYDROCHLORIDE 150 MG: 75 CAPSULE, EXTENDED RELEASE ORAL at 21:05

## 2024-11-03 RX ADMIN — HEPARIN SODIUM 1450 UNITS/HR: 10000 INJECTION, SOLUTION INTRAVENOUS at 13:16

## 2024-11-03 RX ADMIN — ESZOPICLONE 3 MG: 1 TABLET, FILM COATED ORAL at 21:30

## 2024-11-03 RX ADMIN — ESZOPICLONE 3 MG: 1 TABLET, FILM COATED ORAL at 01:04

## 2024-11-03 RX ADMIN — HYDROMORPHONE HYDROCHLORIDE 0.5 MG: 1 INJECTION, SOLUTION INTRAMUSCULAR; INTRAVENOUS; SUBCUTANEOUS at 09:15

## 2024-11-03 RX ADMIN — ACETAMINOPHEN 650 MG: 325 TABLET ORAL at 21:50

## 2024-11-03 RX ADMIN — HYDROMORPHONE HYDROCHLORIDE 0.5 MG: 1 INJECTION, SOLUTION INTRAMUSCULAR; INTRAVENOUS; SUBCUTANEOUS at 21:31

## 2024-11-03 RX ADMIN — HYDROMORPHONE HYDROCHLORIDE 0.5 MG: 1 INJECTION, SOLUTION INTRAMUSCULAR; INTRAVENOUS; SUBCUTANEOUS at 16:58

## 2024-11-03 RX ADMIN — ALPRAZOLAM 0.5 MG: 0.5 TABLET ORAL at 01:02

## 2024-11-03 RX ADMIN — LOSARTAN POTASSIUM 50 MG: 50 TABLET, FILM COATED ORAL at 21:05

## 2024-11-03 RX ADMIN — LOSARTAN POTASSIUM 50 MG: 50 TABLET, FILM COATED ORAL at 01:13

## 2024-11-03 RX ADMIN — OXYCODONE HYDROCHLORIDE 5 MG: 5 TABLET ORAL at 13:26

## 2024-11-03 RX ADMIN — VENLAFAXINE HYDROCHLORIDE 150 MG: 75 CAPSULE, EXTENDED RELEASE ORAL at 01:02

## 2024-11-03 RX ADMIN — OXYCODONE HYDROCHLORIDE 5 MG: 5 TABLET ORAL at 06:16

## 2024-11-03 NOTE — PLAN OF CARE
"Goal Outcome Evaluation:      Plan of Care Reviewed With: patient    Overall Patient Progress: improving      Problem: Adult Inpatient Plan of Care  Goal: Absence of Hospital-Acquired Illness or Injury  Intervention: Prevent Infection  Recent Flowsheet Documentation  Taken 11/3/2024 0129 by Faith Benito RN  Infection Prevention:   rest/sleep promoted   personal protective equipment utilized   hand hygiene promoted     Problem: Adult Inpatient Plan of Care  Goal: Optimal Comfort and Wellbeing  Outcome: Progressing  Intervention: Monitor Pain and Promote Comfort  Recent Flowsheet Documentation  Taken 11/3/2024 0128 by Faith Benito RN  Pain Management Interventions: medication (see MAR)     Problem: Adult Inpatient Plan of Care  Goal: Optimal Comfort and Wellbeing  Intervention: Monitor Pain and Promote Comfort  Recent Flowsheet Documentation  Taken 11/3/2024 0128 by Faith Benito RN  Pain Management Interventions: medication (see MAR)     Problem: Pain Acute  Goal: Optimal Pain Control and Function  Outcome: Progressing  Intervention: Develop Pain Management Plan  Recent Flowsheet Documentation  Taken 11/3/2024 0128 by Faith Benito RN  Pain Management Interventions: medication (see MAR)  Intervention: Prevent or Manage Pain  Recent Flowsheet Documentation  Taken 11/3/2024 0129 by Faith Benito RN  Medication Review/Management: medications reviewed     Problem: Pain Acute  Goal: Optimal Pain Control and Function  Intervention: Prevent or Manage Pain  Recent Flowsheet Documentation  Taken 11/3/2024 0129 by Faith Benito RN  Medication Review/Management: medications reviewed     Patient A&O x 4. Pain 4/10   Heparin protocol   Mid abdomen pain. Sleeping is alternative method that would help, per patient.  Tele shows NSR   /67 (BP Location: Left arm)   Pulse 79   Temp 98.1  F (36.7  C) (Oral)   Resp 18   Ht 1.626 m (5' 4.02\")   Wt 87.1 kg (192 lb 0.3 oz)   SpO2 97%   BMI 32.94 kg/m    Faith" JUAN Benito on 11/3/2024 at 6:22 AM

## 2024-11-03 NOTE — PROGRESS NOTES
Consultation - Hematology/Oncology  Anisa Love,  1980, MRN [id number]    Admitting Dx: Portal vein thrombosis [I81]    PCP: Tiffani Cassidy, [phone]   Code status:  Full Code       Extended Emergency Contact Information  Primary Emergency Contact: JESS HAMPTON  Mobile Phone: [phone]  Relation: Mother  Secondary Emergency Contact: DRAKE LINK  Mobile Phone: [phone]  Relation: Partner       Assessment and Plan     1.  Thrombosis involving intra-abdominal veins: Postoperative in nature this could be considered provoked.  Would recommend 6 months of anticoagulation.  At that point she can revisit with hematology to discuss the possibility of indefinite anticoagulation.  She does not need a thrombophilia evaluation.  Risk factor modification would include smoking cessation and weight loss.  Would avoid estrogen containing medications in the future.  I met with the patient and her .  Multiple questions were answered I believe to their satisfaction.  Will arrange follow-up after discharge.       Chief Complaint <principal problem not specified>       HPI      We have been requested by Dr. Mchugh to evaluate Anisa Love who is a 44 year old year old female for thrombosis.  The patient underwent intra-abdominal surgery on  at the [facility].  It appears she had a subtotal colectomy.  She presented to the hospital with abdominal pain and was found to have areas of thrombosis involving the portal, splenic, and inferior mesenteric veins.  She is on a heparin drip.  Symptoms are improving.  No diarrhea or hematochezia.  No nausea or vomiting.       Medical History  Past Medical History:   Diagnosis Date    Depression     GERD (gastroesophageal reflux disease)     Gonorrhea     HPV (human papilloma virus) anogenital infection     Hypertension     Mental disorder     Motion sickness     Noninfectious ileitis     Other chronic pain 2023    PONV (postoperative nausea and  vomiting)     Sleep apnea     Slight does not use a CPAP    TMJ (temporomandibular joint syndrome)     Pt is able to put 3 fingers stacked in mouth    Ulcerative colitis (H)     Venous thromboembolism 2024      Surgical History  She  has a past surgical history that includes [place] Tooth Extraction; Laparoscopic cholecystectomy (2019);  Section (N/A, 2019); Laparoscopy diagnostic (gyn) (N/A, 8/3/2022); Hysteroscopy, With Endometrial Radiofrequency Ablation - Novasure (2023); and Hysteroscopy, With Endometrial Radiofrequency Ablation - Novasure (N/A, 3/8/2024).   Social History  Reviewed, and she  reports that she has been smoking cigarettes. She has never used smokeless tobacco. She reports current alcohol use. She reports current drug use. Drug: Marijuana.   Allergies  No Known Allergies Family History  Reviewed, and family history is not on file.  No known family history of any blood dyscrasias   Psychosocial Needs  Social History     Social History Narrative    Not on file     Additional psychosocial needs reviewed per nursing assessment.     Prior to Admission Medications   Medications Prior to Admission   Medication Sig Dispense Refill Last Dose/Taking    acyclovir (ZOVIRAX) 400 MG tablet Take 400 mg by mouth 3 times daily as needed   Taking As Needed    ALPRAZolam (XANAX) 0.5 MG tablet Take 0.5 mg by mouth nightly as needed for anxiety.   2024 Bedtime    amLODIPine (NORVASC) 10 MG tablet Take 10 mg by mouth daily.   2024 Evening    eszopiclone (LUNESTA) 3 MG tablet Take 3 mg by mouth at bedtime.   2024 Bedtime    HYDROmorphone (DILAUDID) 2 MG tablet Take 2 mg by mouth every 6 hours as needed for severe pain.   2024 Evening    ibuprofen (ADVIL/MOTRIN) 200 MG tablet Take 400 mg by mouth every 6 hours as needed for pain.   Taking As Needed    losartan (COZAAR) 50 MG tablet Take 50 mg by mouth daily   2024 Evening    ondansetron (ZOFRAN ODT) 4 MG ODT tab Take  "1 tablet (4 mg) by mouth every 8 hours as needed for nausea 4 tablet 0 Past Week    prochlorperazine (COMPAZINE) 5 MG tablet Take 5 mg by mouth every 6 hours as needed for nausea or vomiting.   11/1/2024 Evening    venlafaxine (EFFEXOR XR) 150 MG 24 hr capsule Take 150 mg by mouth daily.   11/1/2024 Evening          Review of Systems:    As above in the history.     Review of Systems otherwise Negative for:  General: chills, fever or night sweats  Psychological: anxiety or depression  Ophthalmic: blurry vision, double vision or loss of vision, vision change  ENT: epistaxis, oral lesions, hearing changes  Hematological and Lymphatic: bleeding, bruising, jaundice, swollen lymph nodes  Endocrine: hot flashes, malaise/lethargy or unexpected weight changes  Respiratory: cough, hemoptysis, orthopnea or shortness of breath/ORTEGA  Cardiovascular: chest pain, edema, palpitations or PND  Gastrointestinal: abdominal pain, blood in stools, change in bowel habits, constipation, diarrhea or nausea/vomiting  Genito-Urinary: change in urinary stream, incontinence, frequency/urgency  Musculoskeletal: joint pain, stiffness, swelling, muscle pain or weakness  Neurological: dizziness, headaches, numbness/tingling  Dermatological: lumps and rash    Physical Exam:    Vitals:    11/02/24 2131 11/02/24 2347 11/03/24 0737 11/03/24 1204   BP: 115/78 117/67 101/63 119/72   Pulse: 83 79 76 80   Resp:  18 18 18   Temp:  98.1  F (36.7  C)  98.4  F (36.9  C)   TempSrc:  Oral  Oral   SpO2: 97% 97% 95% 97%   Weight:  87.1 kg (192 lb 0.3 oz)     Height:  1.626 m (5' 4.02\")       General: patient appears stated age of 44 year old. Nontoxic and in no distress.   HEENT: Head: atraumatic, normocephalic. Sclerae anicteric.  Chest:  Normal respiratory effort  Cardiac:  No edema.   Abdomen: abdomen is soft, non-distended  Extremities: normal tone and muscle bulk. No joint swelling or redness. No clubbing.   Skin: no lesions or rash. Warm and dry.   CNS: " alert and oriented. Grossly non-focal.   Psychiatric: normal mood and affect.        Pertinent Labs:    Lab Results: personally reviewed.   Lab Results   Component Value Date     11/03/2024    CO2 26 11/03/2024    CO2 23 04/20/2022    BUN 9.1 11/03/2024    BUN 12 04/20/2022     Lab Results   Component Value Date    WBC 7.7 11/03/2024    HGB 12.5 11/03/2024    HGB 13.5 12/11/2023    HCT 36.9 11/03/2024    MCV 87 11/03/2024     11/03/2024            Pertinent Radiology:  Radiology Results: Personally reviewed image/s and Personally reviewed impression/s    No results found.

## 2024-11-03 NOTE — PROGRESS NOTES
Patient declines hospital CPAP. Patient states she has very mild sleep apnea and does not use CPAP at home.       Ivon Wood, RT

## 2024-11-03 NOTE — PROGRESS NOTES
[facility]    Medicine Progress Note - Hospitalist Service    Date of Admission:  11/2/2024    Assessment & Plan      Anisa Love is a 44 year old female with history of redundant colon and constipation and was recently admitted 10/21-10/24 at [facility] where she underwent subtotal colectomy with ileorectal anastomosis on 10/22/2024. She presented to ER on 11/2/2024 for evaluation of abdominal pain. She was found to have intraabdominal thrombosis.    Intraabdominal venous thrombosis:  Had colectomy for redudant colon, chronic abdominal pain and constipation on 10/22. Developed post operative ileus. It resolved with NG tube decompression and bowel rest. Prior to discharge on 10/24,  she was having appropriate bowel function and tolerating a general diet.    Following discharge, since 10/31, her abdominal pain returned and progressively getting worse.  CT scan of the abdomen showing inferior mesenteric, splenic, right portal, and extensive intrahepatic portal vein branch thrombosis. Status post interval colectomy, with subcutaneous fluid collection likely representing a hematoma/seroma.   Her thrombosis is likely provoked given her recent colectomy. She does have family history of thrombosis. Her sister has history of pulmonary embolism, but workup are negative. Patient had a still birth due to thrombosis inside the fetus.   - Started heparin drip in ER. Continue  - Abdominal pain control  - Colorectal surgery consulted and input appreciated: no additional intervention  - Hematology consult    Left forearm thrombophlebitis:  She developed left forearm erythema and warmth since 10/30. Had an ultrasound and found to have superficial thrombophlebitis. Patient reports her left forearm pain gets worse today and her left arm becomes more swollen.  - US too rule out DVT  - Pain control    Essential hypertension: Continue PTA amlodipine and losartan. BP controlled.     Anxiety and depression: On  "Xanax and Venlafaxine.         Diet: Advance Diet as Tolerated: Regular Diet Adult; Thin Liquids (level 0); Regular Diet Adult    DVT Prophylaxis: Heparin drip  [place] Catheter: Not present  Lines: None     Cardiac Monitoring: ACTIVE order. Indication: venous thrombosis  Code Status: Full Code      Clinically Significant Risk Factors Present on Admission                   # Hypertension: Noted on problem list         # Obesity: Estimated body mass index is 32.94 kg/m  as calculated from the following:    Height as of this encounter: 1.626 m (5' 4.02\").    Weight as of this encounter: 87.1 kg (192 lb 0.3 oz).              Disposition Plan     Medically Ready for Discharge: Anticipated Tomorrow             Reshma Mchugh MD  Hospitalist Service  [facility]  Securely message with [employer] (more info)  Text page via [employer] Paging/Directory   ______________________________________________________________________    Interval History   Patient reports her abdominal pain is now better controlled. No nausea and no vomiting. She has regular bowel movement. No blood noticed in the stool.     Physical Exam   Vital Signs: Temp: 98.4  F (36.9  C) Temp src: Oral BP: 119/72 Pulse: 80   Resp: 18 SpO2: 97 % O2 Device: None (Room air)    Weight: 192 lbs .33 oz    General appearance: not in acute distress  HEENT: PERRL, EOMI  Lungs: Clear breath sounds in bilateral lung fields  Cardiovascular: Regular rate and rhythm, normal S1-S2  Abdomen: Soft, non tender, no distension  Musculoskeletal: No joint swelling  Skin: No rash and no edema  Neurology: AAO ×3.  Cranial nerves II - XII normal.  Normal muscle strength in all four extremities.     Medical Decision Making       48 MINUTES SPENT BY ME on the date of service doing chart review, history, exam, documentation & further activities per the note.      Data     I have personally reviewed the following data over the past 24 hrs:    7.7  \   12.5   / 393     138 99 9.1 " /  105 (H)   3.6 26 0.67 \     ALT: 131 (H) AST: 19 AP: 77 TBILI: 0.7   ALB: 4.3 TOT PROTEIN: 7.6 LIPASE: 43     Procal: 0.08 CRP: N/A Lactic Acid: 0.5 (L)         Imaging results reviewed over the past 24 hrs:   Recent Results (from the past 24 hours)   CT Abdomen Pelvis w Contrast    Narrative    EXAM: CT ABDOMEN PELVIS W CONTRAST  LOCATION: [facility]  DATE: 11/2/2024    INDICATION: abdominal pain; 10 days postop total colectomy  COMPARISON: 4/23/2024  TECHNIQUE: CT scan of the abdomen and pelvis was performed following injection of IV contrast. Multiplanar reformats were obtained. Dose reduction techniques were used.  CONTRAST: isovue 370 90ml    FINDINGS:   LOWER CHEST: Trace right pleural effusion.    HEPATOBILIARY: New anterior right hepatic lobe intrahepatic portal vein thrombosis (segments 8/5). No biliary dilatation status post cholecystectomy.    PANCREAS: Unremarkable    SPLEEN: Trace perisplenic ascites    ADRENAL GLANDS: Unremarkable    KIDNEYS/BLADDER: No hydronephrosis. Decompressed bladder.    BOWEL: No bowel obstruction. Interval colectomy with pelvic J-pouch. Associated mild small bowel wall thickening in the pelvis, as well as generalized mesenteric and intra-abdominal/pelvic fat stranding. No pneumatosis.    LYMPH NODES: No significant retroperitoneal adenopathy.    VASCULATURE: Intrahepatic portal vein thrombosis as detailed above. Additional nonocclusive thrombus in the right main portal vein. Thrombus noted within the central portions of the splenic vein, extending from the inferior mesenteric vein which appears   likely occluded into the pelvis.    PELVIC ORGANS: Small volume of free pelvic fluid. Nabothian cyst in the cervix. Small amount of extraperitoneal gas in the pelvis compatible with recent surgery.    MUSCULOSKELETAL: Postoperative stranding in the anterior abdominal wall. Fluid collection in the anterior pelvic subcutaneous soft tissues measures 7.2 x 2.4 x  4.4 cm favored represent a postoperative hematoma/seroma. Small amount of residual   subcutaneous gas.      Impression    IMPRESSION:   1.  Inferior mesenteric, splenic, right portal, and extensive intrahepatic portal vein branch thrombosis as detailed above.  2.  Status post interval colectomy, with subcutaneous fluid collection likely representing a hematoma/seroma.  3.  Additional findings as above.    Venous thrombosis was called to Dr. Alex Bran by Dr. Montrell Vance on 11/2/2024 9:01 PM CDT.

## 2024-11-03 NOTE — H&P
[facility]    History and Physical - Hospitalist Service       Date of Admission:  11/2/2024    Assessment & Plan      Anisa Love is a 44 year old female admitted on 11/2/2024. She she has a history of redundant colon and constipation and was recently admitted 10/21-10/24 at [facility] where she underwent subtotal colectomy with ileorectal anastomosis on 10/22/2024 who presents here to 11/2/2024 for evaluation of abdominal pain.    Abdominal pain   Venous thrombosis, inferior mesenteric, splenic, right portal and extensive intrahepatic portal vein branch thrombosis- provoked  History of recent subtotal colectomy with ileorectal anastomosis 10/22/2024  Reviewed discharge summary from [facility]. Had colon surgery for constipation , redudant colon and chronic abdominal pain.  Noted during hospital stay patient had postoperative ileus with nausea and vomiting that required decompression with an NG tube.  This was removed after successful clamping trials and diet was advanced.  Per chart patient was having appropriate bowel function and tolerating a general diet at discharge.  Following discharge, surgery developed abdominal pain, progressive  CT here showing interval colectomy with pelvic J-pouch associated with some mild small bowel thickening in the pelvis as well as generalized mesenteric and intra-abdominal/pelvic fat stranding.  No pneumatosis.  Inferior mesenteric, splenic, right portal, and extensive intrahepatic portal vein branch thrombosis. Status post interval colectomy, with subcutaneous fluid collection likely representing a hematoma/seroma.    -ED provider spoke with colorectal surgery, no surgical intervention at this time  -Will place colorectal surgery consult to follow patient while here  -Patient was started on IV heparin, will continue this per protocol. Will need transition to DOAC for 3-6 months for provoked thrombosis.   -Has sister with hx of thrombosis after IUD  "placement, ? Family hx. Recommend hematology follow up at discharge o evaluate for ?thrombophilia  -Will hold off on antibiotics at this time.  Will check urinalysis and urine culture.  Check Pro-Min.  Monitor for fevers, wbc.  Low threshold to start antibiotics if clinical status changes.    Hx of constipation  Was on Linzess-plan was to hold this medication for 4 to 6 weeks after surgery    Left superficial phlebitis  Noted to have increasing swelling tenderness and normal the IV site of her left forearm.  Doppler ultrasound of her arm shows a superficial thrombi dizziness in the cephalic vein of the left arm  -Warm compress as needed    Known issues  History of hypertension  Patient takes amlodipine at home and losartan at home at bedtime.  Will resume once med rec complete    History of generalized anxiety disorder  Patient takes venlafaxine at home.  Will resume once med rec complete  .  History of insomnia  Managed with Xanax and Lunesta at bedtime at home      History of tobacco use  Patient smoked 1 pack/month since age 18.  Quit in 9/1/2024    Obesity  BMI 33.3     Diet:  Advance diet as tolerated  DVT Prophylaxis: On heparin  [place] Catheter: Not present  Lines: None     Cardiac Monitoring: ACTIVE order. Indication: venous thrombosis  Code Status: Full Code      Clinically Significant Risk Factors Present on Admission                   # Hypertension: Noted on problem list    # Obesity: Estimated body mass index is 32.1 kg/m  as calculated from the following:    Height as of this encounter: 1.626 m (5' 4\").    Weight as of this encounter: 84.8 kg (187 lb).              Disposition Plan     Medically Ready for Discharge: Anticipated in 2-4 Days       Nalini Isaacs MD  Hospitalist Service  [facility]  Securely message with [employer] (more info)  Text page via [facility] Paging/Directory     ______________________________________________________________________    Chief Complaint "   Abdominal pain  History is obtained from the patient    History of Present Illness   Anisa Love is a 44 year old female admitted on 11/2/2024. She she has a history of redundant colon and constipation and was recently admitted 10/21-10/24 at [facility] where she underwent subtotal colectomy with ileorectal anastomosis on 10/22/2024 who presents here to 11/2/2024 for evaluation of abdominal pain.    Seen and evaluated patient was still boarding in the ED.  Tells me that she struggled with abdominal pain, constipation and redundant colon for several years.  Underwent colonic surgery in May on 10/22/2024.  Postoperatively she developed ileus and required an NG tube placement for decompression.  Patient improved and was discharged in stable condition able to tolerate a regular diet and passing good bowel movements.  Since discharge has been progressively advancing her diet, mostly taking liquid and soft meals, however on Thursday noticed abdominal pain and discomfort and she/was developing ileus again.  She backed off on her diet and has not been eating much and decreased her pain medications as well thinking it was a pain medicine causing ileus.  She also states that she had a fever today and abdomen pain worsened prompting ED visit.  Denies cough denies chest pain, nausea, vomiting, swelling of her lower extremities, urinary pain, frequency or dysuria.      Past Medical History    Past Medical History:   Diagnosis Date    Depression     GERD (gastroesophageal reflux disease)     Gonorrhea     HPV (human papilloma virus) anogenital infection     Hypertension     Mental disorder     Motion sickness     Noninfectious ileitis     Other chronic pain 12/18/2023    PONV (postoperative nausea and vomiting)     Sleep apnea     Slight does not use a CPAP    TMJ (temporomandibular joint syndrome)     Pt is able to put 3 fingers stacked in mouth    Ulcerative colitis (H)     Venous thromboembolism 11/2/2024       Past  Surgical History   Past Surgical History:   Procedure Laterality Date     SECTION N/A 2019    Procedure:  SECTION;  Surgeon: Magi Ayala MD;  Location: [facility] OR;  Service: Obstetrics    HYSTEROSCOPY, WITH ENDOMETRIAL RADIOFREQUENCY ABLATION - NOVASURE  2023    Procedure: HYSTEROSCOPY with DILATION AND CURETTAGE, DIAGNOSTIC LAPAROSCOPY, BILATERAL LAPAROSCOPIC SALPINGECTOMY;  Surgeon: Anitha Archuleta DO;  Location: [facility] OR    HYSTEROSCOPY, WITH ENDOMETRIAL RADIOFREQUENCY ABLATION - NOVASURE N/A 3/8/2024    Procedure: HYSTEROSCOPY, WITH ENDOMETRIAL ABLATION;  Surgeon: Anitha Archuleta DO;  Location: [facility] OR    LAPAROSCOPIC CHOLECYSTECTOMY  2019    LAPAROSCOPY DIAGNOSTIC (GYN) N/A 8/3/2022    Procedure: DIAGNOSTIC LAPAROSCOPY, excision of endometrial cysts and cauterizaton of endometrial cysts;  Surgeon: Minerva York MD;  Location: [facility] OR    WISDOM TOOTH EXTRACTION         Prior to Admission Medications   Prior to Admission Medications   Prescriptions Last Dose Informant Patient Reported? Taking?   ALPRAZolam (XANAX) 0.5 MG tablet 2024 Bedtime  Yes Yes   Sig: Take 0.5 mg by mouth nightly as needed for anxiety.   HYDROmorphone (DILAUDID) 2 MG tablet 2024 Evening  Yes Yes   Sig: Take 2 mg by mouth every 6 hours as needed for severe pain.   acyclovir (ZOVIRAX) 400 MG tablet   Yes Yes   Sig: Take 400 mg by mouth 3 times daily as needed   amLODIPine (NORVASC) 10 MG tablet 2024 Evening  Yes Yes   Sig: Take 10 mg by mouth daily.   eszopiclone (LUNESTA) 3 MG tablet 2024 Bedtime  Yes Yes   Sig: Take 3 mg by mouth at bedtime.   ibuprofen (ADVIL/MOTRIN) 200 MG tablet   Yes Yes   Sig: Take 400 mg by mouth every 6 hours as needed for pain.   losartan (COZAAR) 50 MG tablet 2024 Evening  Yes Yes   Sig: Take 50 mg by mouth daily   ondansetron (ZOFRAN ODT) 4 MG ODT tab Past Week  No Yes   Sig: Take 1 tablet (4 mg) by  mouth every 8 hours as needed for nausea   prochlorperazine (COMPAZINE) 5 MG tablet 11/1/2024 Evening  Yes Yes   Sig: Take 5 mg by mouth every 6 hours as needed for nausea or vomiting.   venlafaxine (EFFEXOR XR) 150 MG 24 hr capsule 11/1/2024 Evening  Yes Yes   Sig: Take 150 mg by mouth daily.      Facility-Administered Medications: None          Physical Exam   Vital Signs: Temp: 97.9  F (36.6  C) Temp src: Temporal BP: 115/78 Pulse: 83   Resp: 18 SpO2: 97 % O2 Device: None (Room air)    Weight: 187 lbs 0 oz  General: AAOx3, NAD  HEENT: Oral mucosa moist and non-erythematous, PERRLA, EOM intact  CV: RRR, normal S1S2, no murmur, clicks, rubs  Resp: Clear to auscultation bilaterally, no wheezes, rhonchi  Abd: Soft, mildly tender on deep palpation.  Surgical sites appear clean  extremities: Radial and pedal pulses intact and symmetric, no pedal edema  Neuro: No lateralizing symptoms or focal neurologic deficits      Medical Decision Making       55 MINUTES SPENT BY ME on the date of service doing chart review, history, exam, documentation & further activities per the note.      Data     I have personally reviewed the following data over the past 24 hrs:    10.0  \   13.2   / 439     138 98 10.8 /  106 (H)   3.5 26 0.92 \     ALT: 131 (H) AST: 19 AP: 77 TBILI: 0.7   ALB: 4.3 TOT PROTEIN: 7.6 LIPASE: 43     Procal: N/A CRP: N/A Lactic Acid: 0.5 (L)         Imaging results reviewed over the past 24 hrs:   Recent Results (from the past 24 hours)   CT Abdomen Pelvis w Contrast    Narrative    EXAM: CT ABDOMEN PELVIS W CONTRAST  LOCATION: [facility]  DATE: 11/2/2024    INDICATION: abdominal pain; 10 days postop total colectomy  COMPARISON: 4/23/2024  TECHNIQUE: CT scan of the abdomen and pelvis was performed following injection of IV contrast. Multiplanar reformats were obtained. Dose reduction techniques were used.  CONTRAST: isovue 370 90ml    FINDINGS:   LOWER CHEST: Trace right pleural  effusion.    HEPATOBILIARY: New anterior right hepatic lobe intrahepatic portal vein thrombosis (segments 8/5). No biliary dilatation status post cholecystectomy.    PANCREAS: Unremarkable    SPLEEN: Trace perisplenic ascites    ADRENAL GLANDS: Unremarkable    KIDNEYS/BLADDER: No hydronephrosis. Decompressed bladder.    BOWEL: No bowel obstruction. Interval colectomy with pelvic J-pouch. Associated mild small bowel wall thickening in the pelvis, as well as generalized mesenteric and intra-abdominal/pelvic fat stranding. No pneumatosis.    LYMPH NODES: No significant retroperitoneal adenopathy.    VASCULATURE: Intrahepatic portal vein thrombosis as detailed above. Additional nonocclusive thrombus in the right main portal vein. Thrombus noted within the central portions of the splenic vein, extending from the inferior mesenteric vein which appears   likely occluded into the pelvis.    PELVIC ORGANS: Small volume of free pelvic fluid. Nabothian cyst in the cervix. Small amount of extraperitoneal gas in the pelvis compatible with recent surgery.    MUSCULOSKELETAL: Postoperative stranding in the anterior abdominal wall. Fluid collection in the anterior pelvic subcutaneous soft tissues measures 7.2 x 2.4 x 4.4 cm favored represent a postoperative hematoma/seroma. Small amount of residual   subcutaneous gas.      Impression    IMPRESSION:   1.  Inferior mesenteric, splenic, right portal, and extensive intrahepatic portal vein branch thrombosis as detailed above.  2.  Status post interval colectomy, with subcutaneous fluid collection likely representing a hematoma/seroma.  3.  Additional findings as above.    Venous thrombosis was called to Dr. Alex Bran by Dr. Montrell Vance on 11/2/2024 9:01 PM CDT.

## 2024-11-03 NOTE — PLAN OF CARE
Goal Outcome Evaluation:      Plan of Care Reviewed With: patient          Outcome Evaluation: pt admitted with thrombus s/p colectomy surgery at [facility] recently. continues on Heparin iv therapy. up ad ian in room. has progressed to better pain control with prn dose of dilaudid iv given this am & oral oxycodone.

## 2024-11-03 NOTE — PHARMACY-ADMISSION MEDICATION HISTORY
Pharmacist Admission Medication History    Admission medication history is complete. The information provided in this note is only as accurate as the sources available at the time of the update.    Information Source(s): Patient via in-person    Pertinent Information: Patient was recently discharged s/p colorectal surgery    Changes made to PTA medication list:  Added: Hydromorphone, alprazolam, prochlorperazine  Deleted: hydrocodone-acetaminophen, linaclotide, venlafaxine (duplicate)  Changed: Eszopiclone - Changed from 2 mg to 3 mg. Ibuprofen - Changed from 800 mg to 400 mg. Venlafaxine - Changed from 75 mg to 150 mg.    Allergies reviewed with patient and updates made in EHR: yes    Medication History Completed By: Amador Parrish [facility] 11/2/2024 10:02 PM    PTA Med List   Medication Sig Last Dose/Taking    acyclovir (ZOVIRAX) 400 MG tablet Take 400 mg by mouth 3 times daily as needed Taking As Needed    ALPRAZolam (XANAX) 0.5 MG tablet Take 0.5 mg by mouth nightly as needed for anxiety. 11/1/2024 Bedtime    amLODIPine (NORVASC) 10 MG tablet Take 10 mg by mouth daily. 11/1/2024 Evening    eszopiclone (LUNESTA) 3 MG tablet Take 3 mg by mouth at bedtime. 11/1/2024 Bedtime    HYDROmorphone (DILAUDID) 2 MG tablet Take 2 mg by mouth every 6 hours as needed for severe pain. 11/1/2024 Evening    ibuprofen (ADVIL/MOTRIN) 200 MG tablet Take 400 mg by mouth every 6 hours as needed for pain. Taking As Needed    losartan (COZAAR) 50 MG tablet Take 50 mg by mouth daily 11/1/2024 Evening    ondansetron (ZOFRAN ODT) 4 MG ODT tab Take 1 tablet (4 mg) by mouth every 8 hours as needed for nausea Past Week    prochlorperazine (COMPAZINE) 5 MG tablet Take 5 mg by mouth every 6 hours as needed for nausea or vomiting. 11/1/2024 Evening    venlafaxine (EFFEXOR XR) 150 MG 24 hr capsule Take 150 mg by mouth daily. 11/1/2024 Evening

## 2024-11-03 NOTE — CONSULTS
"COLORECTAL CONSULT NOTE     CC: Abdominal pain     HPI: Anisa Love is a 44 year old female with history of redundant colon and constipation that was recently admitted 10/21-10/24 at [facility] where she underwent subtotal colectomy with ileorectal anastomosis on 10/22/2024. Postoperative course notable for ileus requiring NGT. Presented to the ED with a several day history of abdominal pain. She had worsening pain yesterday that prompted her to seek evaluation.     Vitals stable and within normal limits. Labs unremarkable. CT AP with extensive venous thrombosis of the inferior mesenteric, splenic and portal vein with extension into the right hepatic portal vein branch. The bowels are slightly distended, no evidence of mesenteric edema and the Baker anastomosis looks intact, without evidence of leak. She denies nausea, emesis or bloating. Endorses passing gas and bowel movements.     PMH:  - Dyssynergy Pelvic Floor  - Constipation  - Redundant Colon  - Anxiety/depression   - Hx tobacco use  - Obesity   - Endometriosis     PSH:   - As noted above   -   - Cholecystectomy    Medications:   - Reviewed    Allergies: NKDA      /72 (BP Location: Right arm)   Pulse 80   Temp 98.4  F (36.9  C) (Oral)   Resp 18   Ht 1.626 m (5' 4.02\")   Wt 87.1 kg (192 lb 0.3 oz)   SpO2 97%   BMI 32.94 kg/m      NAD   RR  No labored breathing  Abdomen is soft, not tender and not distended  Incisions are clean dry and intact, closed with dermabond     Labs and imaging reviewed    A/P: 44F with constipation and redundant colon who is sp hand assisted laparoscopic total abdominal colectomy with ileorectal anastomosis on 10/22 at [facility] who presents with abdominal pain, found to have mesenteric/portal vein thrombosis. PVT can occur following colorectal procedures such as this and presentation varies base on the degree/location of thrombosis. Mainstay of treatment is anticoagulation therapy. Her symptoms are improving with " anticoagulation and she has no signs of intestinal ischemia and/or other postoperative complications. Agree with hematological workup to ensure there is not a genetic component as well. Nonetheless, may advance diet as tolerated. We will sign off at this time, please don't hesitate to reach out with any further questions or concerns. She should follow up with her surgeon at [facility] and encouraged her to send a message notifying them of her admission and the above findings.     Colon and Rectal Surgery Staff Addendum    Patient discussed with Dr. Coombs.  Agree with assessment and plan.    Amador Hare MD ....................  11/4/2024   10:51 AM  Colon and Rectal Surgery Staff  [phone]

## 2024-11-03 NOTE — ED NOTES
"[facility] ED Handoff Report    ED Chief Complaint: abdominal pain    ED Diagnosis:  (I81) Portal vein thrombosis  Comment:   Plan: hep gtt       PMH:    Past Medical History:   Diagnosis Date    Depression     GERD (gastroesophageal reflux disease)     Gonorrhea     HPV (human papilloma virus) anogenital infection     Hypertension     Mental disorder     Motion sickness     Noninfectious ileitis     Other chronic pain 12/18/2023    PONV (postoperative nausea and vomiting)     Sleep apnea     Slight does not use a CPAP    TMJ (temporomandibular joint syndrome)     Pt is able to put 3 fingers stacked in mouth    Ulcerative colitis (H)     Venous thromboembolism 11/2/2024        Code Status:  Full Code     Falls Risk: No Band: Not applicable    Current Living Situation/Residence: lives with a significant other     Elimination Status: Continent: Yes     Activity Level: Independent    Patients Preferred Language:  English     Needed: No    Vital Signs:  /78   Pulse 83   Temp 97.9  F (36.6  C) (Temporal)   Resp 18   Ht 1.626 m (5' 4\")   Wt 84.8 kg (187 lb)   SpO2 97%   BMI 32.10 kg/m       Cardiac Rhythm: NA    Pain Score: 8/10    Is the Patient Confused:  No    Last Food or Drink: 11/02/24 at 2230    Focused Assessment:  alert and oriented, abdominal pain post colectomy. Found to have PVT, hep gtt started. Dilaudid for pain.     Tests Performed: Done: Labs and Imaging    Treatments Provided:  dilaudid IV, toradol, hep gtt    Family Dynamics/Concerns: No    Family Updated On Visitor Policy: Yes    Plan of Care Communicated to Family: Yes    Who Was Updated about Plan of Care: sister present    Belongings Checklist Done and Signed by Patient: No    Belongings Sent with Patient: clothing, shoes, bag, phone    Medications sent with patient: na    Covid: asymptomatic , na    Additional Information:     RN: Denae Arambula RN 11/2/2024 10:48 PM        "

## 2024-11-04 ENCOUNTER — PATIENT OUTREACH (OUTPATIENT)
Dept: ONCOLOGY | Facility: CLINIC | Age: 44
End: 2024-11-04
Payer: COMMERCIAL

## 2024-11-04 VITALS
TEMPERATURE: 97 F | WEIGHT: 185.2 LBS | HEART RATE: 61 BPM | SYSTOLIC BLOOD PRESSURE: 112 MMHG | RESPIRATION RATE: 16 BRPM | OXYGEN SATURATION: 96 % | HEIGHT: 64 IN | DIASTOLIC BLOOD PRESSURE: 78 MMHG | BODY MASS INDEX: 31.62 KG/M2

## 2024-11-04 DIAGNOSIS — K55.069 MESENTERIC VEIN THROMBOSIS (H): Primary | ICD-10-CM

## 2024-11-04 PROBLEM — I82.890 ACUTE THROMBOSIS OF SPLENIC VEIN: Status: ACTIVE | Noted: 2024-11-04

## 2024-11-04 LAB
BACTERIA UR CULT: NO GROWTH
HOLD SPECIMEN: NORMAL
HOLD SPECIMEN: NORMAL
UFH PPP CHRO-ACNC: 0.26 IU/ML

## 2024-11-04 PROCEDURE — 85520 HEPARIN ASSAY: CPT | Performed by: INTERNAL MEDICINE

## 2024-11-04 PROCEDURE — 99239 HOSP IP/OBS DSCHRG MGMT >30: CPT | Performed by: INTERNAL MEDICINE

## 2024-11-04 PROCEDURE — 250N000013 HC RX MED GY IP 250 OP 250 PS 637: Performed by: STUDENT IN AN ORGANIZED HEALTH CARE EDUCATION/TRAINING PROGRAM

## 2024-11-04 PROCEDURE — 36415 COLL VENOUS BLD VENIPUNCTURE: CPT | Performed by: INTERNAL MEDICINE

## 2024-11-04 PROCEDURE — 250N000011 HC RX IP 250 OP 636: Performed by: STUDENT IN AN ORGANIZED HEALTH CARE EDUCATION/TRAINING PROGRAM

## 2024-11-04 PROCEDURE — 250N000013 HC RX MED GY IP 250 OP 250 PS 637: Performed by: INTERNAL MEDICINE

## 2024-11-04 RX ORDER — HYDROMORPHONE HYDROCHLORIDE 2 MG/1
2 TABLET ORAL EVERY 6 HOURS PRN
Qty: 12 TABLET | Refills: 0 | Status: SHIPPED | OUTPATIENT
Start: 2024-11-04 | End: 2024-11-07

## 2024-11-04 RX ORDER — AMOXICILLIN 250 MG
1 CAPSULE ORAL 2 TIMES DAILY PRN
COMMUNITY
Start: 2024-11-04

## 2024-11-04 RX ORDER — HYDROMORPHONE HYDROCHLORIDE 2 MG/1
2 TABLET ORAL EVERY 6 HOURS PRN
Status: DISCONTINUED | OUTPATIENT
Start: 2024-11-04 | End: 2024-11-04

## 2024-11-04 RX ORDER — ACETAMINOPHEN 325 MG/1
650 TABLET ORAL EVERY 4 HOURS PRN
COMMUNITY
Start: 2024-11-04

## 2024-11-04 RX ORDER — HYDROMORPHONE HYDROCHLORIDE 4 MG/1
4 TABLET ORAL EVERY 6 HOURS PRN
Status: DISCONTINUED | OUTPATIENT
Start: 2024-11-04 | End: 2024-11-04 | Stop reason: HOSPADM

## 2024-11-04 RX ADMIN — HEPARIN SODIUM 1450 UNITS/HR: 10000 INJECTION, SOLUTION INTRAVENOUS at 06:04

## 2024-11-04 RX ADMIN — APIXABAN 10 MG: 5 TABLET, FILM COATED ORAL at 08:53

## 2024-11-04 RX ADMIN — HYDROMORPHONE HYDROCHLORIDE 2 MG: 2 TABLET ORAL at 08:54

## 2024-11-04 RX ADMIN — ALPRAZOLAM 0.5 MG: 0.5 TABLET ORAL at 00:11

## 2024-11-04 ASSESSMENT — ACTIVITIES OF DAILY LIVING (ADL)
ADLS_ACUITY_SCORE: 0

## 2024-11-04 NOTE — DISCHARGE SUMMARY
"[facility]  Hospitalist Discharge Summary      Date of Admission:  11/2/2024  Date of Discharge:  11/4/2024  Discharging Provider: Reshma Mchugh MD  Discharge Service: Hospitalist Service    Discharge Diagnoses     Principal Problem:    Venous thromboembolism  Active Problems:    Benign essential hypertension    Mixed anxiety and depressive disorder    Portal vein thrombosis    Thrombophlebitis of arm, left    Acute thrombosis of splenic vein       Clinically Significant Risk Factors     # Obesity: Estimated body mass index is 31.77 kg/m  as calculated from the following:    Height as of this encounter: 1.626 m (5' 4.02\").    Weight as of this encounter: 84 kg (185 lb 3.2 oz).       Follow-ups Needed After Discharge   Follow-up Appointments       Follow Up      Follow up with hematology in 3 months. Referral has been provided.              Discharge Disposition   Discharged to home  Condition at discharge: Stable    Hospital Course     Anisa Love is a 44 year old female with history of redundant colon and constipation and was recently admitted 10/21-10/24 at [facility] where she underwent subtotal colectomy with ileorectal anastomosis on 10/22/2024. She presented to ER on 11/2/2024 for evaluation of abdominal pain. She was found to have intraabdominal venous thrombosis.     Intraabdominal venous thrombosis:  She had colectomy for redudant colon, chronic abdominal pain and constipation on 10/22/24 in [facility]. She developed post operative ileus. It resolved with NG tube decompression and bowel rest. Prior to discharge on 10/24,  she was having appropriate bowel function and tolerating a general diet.    Following her discharge, since 10/31, her abdominal pain returned and has been progressively getting worse.  CT scan of the abdomen showed inferior mesenteric, splenic, right portal, and extensive intrahepatic portal vein branch thrombosis. Status post interval colectomy, with " subcutaneous fluid collection likely representing a hematoma/seroma.   Her thrombosis is likely provoked given her recent colectomy. She does have family history of thrombosis. Her sister has history of pulmonary embolism, but workup are negative. Patient had a still birth due to thrombosis inside the fetus.   Patient was started heparin drip in ER. Her abdominal pain is appropriately controlled. Colorectal surgery was consulted and recommends no additional intervention. Hematology was also consulted and concludes that her venous thrombosis is provoked in nature and thrombophilia workup is not necessary. Patient is recommended to have anticoagulation for 6 months. Patient remained stable during her hospital stay. Heparin drip was transitioned Eliquis.      Left forearm thrombophlebitis:  She developed left forearm erythema and warmth since 10/30. Prior to admission, she had an ultrasound and found to have superficial thrombophlebitis. During her hospital stay, she reports her left forearm pain gets worse and her left arm becomes more swollen. US was repeated. It demonstrated acute SVT involving the left cephalic vein in the forearm, no DVT in left upper extremity.     Essential hypertension: Continue PTA amlodipine and losartan. BP is controlled.      Anxiety and depression: Continue on Xanax and Venlafaxine.       Consultations This Hospital Stay   PHARMACY IP CONSULT  COLORECTAL SURGERY IP CONSULT  PHARMACY IP CONSULT  PHARMACY LIAISON FOR MEDICATION COVERAGE CONSULT  HEMATOLOGY & ONCOLOGY IP CONSULT    Code Status   Full Code    Time Spent on this Encounter   I, Reshma Mchugh MD, personally saw the patient today and spent greater than 30 minutes discharging this patient.       Reshma Mchugh MD  [address] [address]  Phone: [phone]  Fax: [phone]  ______________________________________________________________________    Physical Exam   Vital  Signs: Temp: 97  F (36.1  C) Temp src: Oral BP: 112/78 Pulse: 61   Resp: 16 SpO2: 96 % O2 Device: None (Room air)    Weight: 185 lbs 3.2 oz    General appearance: not in acute distress  HEENT: PERRL, EOMI  Lungs: Clear breath sounds in bilateral lung fields  Cardiovascular: Regular rate and rhythm, normal S1-S2  Abdomen: Soft, non tender, no distension  Musculoskeletal: No joint swelling  Skin: No rash and no edema  Neurology: AAO ×3.  Cranial nerves II - XII normal.  Normal muscle strength in all four extremities.        Primary Care Physician   Tiffani Cassidy    Discharge Orders      Adult Oncology/Hematology  Referral      Reason for your hospital stay    Evaluation of abdominal pain.     Activity    Your activity upon discharge: activity as tolerated     Follow Up    Follow up with hematology in 3 months. Referral has been provided.     Diet    Follow this diet upon discharge:  Regular Diet       Significant Results and Procedures   Most Recent 3 CBC's:  Recent Labs   Lab Test 11/03/24  0756 11/03/24  0143 11/02/24 1919   WBC 7.7 7.8 10.0   HGB 12.5 12.1 13.2   MCV 87 88 87    383 439     Most Recent 3 BMP's:  Recent Labs   Lab Test 11/03/24  0756 11/02/24  1919 09/04/24  1605    138 137   POTASSIUM 3.6 3.5 4.2   CHLORIDE 99 98 100   CO2 26 26 25   BUN 9.1 10.8 16.8   CR 0.67 0.92 0.76   ANIONGAP 13 14 12   CLAUDIA 9.1 9.8 9.4   * 106* 113*   ,   Results for orders placed or performed during the hospital encounter of 11/02/24   CT Abdomen Pelvis w Contrast    Narrative    EXAM: CT ABDOMEN PELVIS W CONTRAST  LOCATION: [facility]  DATE: 11/2/2024    INDICATION: abdominal pain; 10 days postop total colectomy  COMPARISON: 4/23/2024  TECHNIQUE: CT scan of the abdomen and pelvis was performed following injection of IV contrast. Multiplanar reformats were obtained. Dose reduction techniques were used.  CONTRAST: isovue 370 90ml    FINDINGS:   LOWER CHEST: Trace right  pleural effusion.    HEPATOBILIARY: New anterior right hepatic lobe intrahepatic portal vein thrombosis (segments 8/5). No biliary dilatation status post cholecystectomy.    PANCREAS: Unremarkable    SPLEEN: Trace perisplenic ascites    ADRENAL GLANDS: Unremarkable    KIDNEYS/BLADDER: No hydronephrosis. Decompressed bladder.    BOWEL: No bowel obstruction. Interval colectomy with pelvic J-pouch. Associated mild small bowel wall thickening in the pelvis, as well as generalized mesenteric and intra-abdominal/pelvic fat stranding. No pneumatosis.    LYMPH NODES: No significant retroperitoneal adenopathy.    VASCULATURE: Intrahepatic portal vein thrombosis as detailed above. Additional nonocclusive thrombus in the right main portal vein. Thrombus noted within the central portions of the splenic vein, extending from the inferior mesenteric vein which appears   likely occluded into the pelvis.    PELVIC ORGANS: Small volume of free pelvic fluid. Nabothian cyst in the cervix. Small amount of extraperitoneal gas in the pelvis compatible with recent surgery.    MUSCULOSKELETAL: Postoperative stranding in the anterior abdominal wall. Fluid collection in the anterior pelvic subcutaneous soft tissues measures 7.2 x 2.4 x 4.4 cm favored represent a postoperative hematoma/seroma. Small amount of residual   subcutaneous gas.      Impression    IMPRESSION:   1.  Inferior mesenteric, splenic, right portal, and extensive intrahepatic portal vein branch thrombosis as detailed above.  2.  Status post interval colectomy, with subcutaneous fluid collection likely representing a hematoma/seroma.  3.  Additional findings as above.    Venous thrombosis was called to Dr. Alex Bran by Dr. Montrell Vance on 11/2/2024 9:01 PM CDT.   US Upper Extremity Venous Duplex Left    Narrative    EXAM: US UPPER EXTREMITY VENOUS DUPLEX LEFT  LOCATION: [facility]  DATE: 11/3/2024    INDICATION: Worsening pain and swelling  of left forearm. Evaluation of DVT.  COMPARISON: None.  TECHNIQUE: Venous Duplex ultrasound of the left upper extremity with (when possible) and without compression, augmentation, and duplex. Color flow and spectral Doppler with waveform analysis performed.    FINDINGS: Ultrasound includes evaluation of the internal jugular vein, innominate vein, subclavian vein, axillary vein, and brachial vein. The superficial cephalic and basilic veins were also evaluated where seen.     LEFT: No deep venous thrombosis. Acute occlusive superficial thrombophlebitis involves the left cephalic vein from the upper forearm to wrist level.       Impression    IMPRESSION:   1.  No deep venous thrombosis in the left upper extremity.  2.  Acute SVT involving the left cephalic vein in the forearm.       Discharge Medications   Current Discharge Medication List        START taking these medications    Details   acetaminophen (TYLENOL) 325 MG tablet Take 2 tablets (650 mg) by mouth every 4 hours as needed for mild pain.    Associated Diagnoses: Venous thromboembolism      apixaban ANTICOAGULANT (ELIQUIS) 5 MG tablet Take 2 tablets (10 mg) by mouth 2 times daily for 7 days, THEN 1 tablet (5 mg) 2 times daily.  Qty: 208 tablet, Refills: 0    Associated Diagnoses: Venous thromboembolism      senna-docusate (SENOKOT-S/PERICOLACE) 8.6-50 MG tablet Take 1 tablet by mouth 2 times daily as needed for constipation.    Associated Diagnoses: Venous thromboembolism           CONTINUE these medications which have CHANGED    Details   HYDROmorphone (DILAUDID) 2 MG tablet Take 1 tablet (2 mg) by mouth every 6 hours as needed for severe pain.  Qty: 12 tablet, Refills: 0    Associated Diagnoses: Venous thromboembolism           CONTINUE these medications which have NOT CHANGED    Details   acyclovir (ZOVIRAX) 400 MG tablet Take 400 mg by mouth 3 times daily as needed      ALPRAZolam (XANAX) 0.5 MG tablet Take 0.5 mg by mouth nightly as needed for anxiety.       amLODIPine (NORVASC) 10 MG tablet Take 10 mg by mouth daily.      eszopiclone (LUNESTA) 3 MG tablet Take 3 mg by mouth at bedtime.      losartan (COZAAR) 50 MG tablet Take 50 mg by mouth daily      ondansetron (ZOFRAN ODT) 4 MG ODT tab Take 1 tablet (4 mg) by mouth every 8 hours as needed for nausea  Qty: 4 tablet, Refills: 0    Associated Diagnoses: S/P endometrial ablation      prochlorperazine (COMPAZINE) 5 MG tablet Take 5 mg by mouth every 6 hours as needed for nausea or vomiting.      venlafaxine (EFFEXOR XR) 150 MG 24 hr capsule Take 150 mg by mouth daily.           STOP taking these medications       ibuprofen (ADVIL/MOTRIN) 200 MG tablet Comments:   Reason for Stopping:             Allergies   No Known Allergies

## 2024-11-04 NOTE — CONSULTS
11/4 Test claim for DOAC'S- Both Eliquis and Xarelto are covered 100% no cost for the patient. Thank you for allowing me to help with your patient  Yaa Rice [facility]  Pharmacy Discharge Liaison [facility]

## 2024-11-04 NOTE — PROVIDER NOTIFICATION
Notified MD at 1727 PM regarding  pt request to be off cardiac monitoring while showering .      Spoke with: Dr. Reshma Mchugh     Orders were not obtained but provider agreed to have pt off cardiac monitoring while showering.

## 2024-11-04 NOTE — PLAN OF CARE
RN discussed discharge instructions with patient.  Patient verbalized understanding and voiced no concerns at this time.  1 Hard script given to patient and another medication e-scripted to home pharmacy.  Patient aware and will .  No significant changes in presentation.  Patient will discharge home with family.

## 2024-11-04 NOTE — PLAN OF CARE
Goal Outcome Evaluation:      Plan of Care Reviewed With: patient    Overall Patient Progress: improving       Problem: Adult Inpatient Plan of Care  Goal: Absence of Hospital-Acquired Illness or Injury  Intervention: Identify and Manage Fall Risk  Recent Flowsheet Documentation  Taken 11/4/2024 0050 by Faith Benito RN  Safety Promotion/Fall Prevention:   treat underlying cause   treat reversible contributory factors   safety round/check completed   patient and family education   nonskid shoes/slippers when out of bed   mobility aid in reach   lighting adjusted   increase visualization of patient   clutter free environment maintained     Problem: Adult Inpatient Plan of Care  Goal: Absence of Hospital-Acquired Illness or Injury  Intervention: Prevent Infection  Recent Flowsheet Documentation  Taken 11/4/2024 0050 by Faith Benito RN  Infection Prevention:   rest/sleep promoted   hand hygiene promoted   single patient room provided     Problem: Adult Inpatient Plan of Care  Goal: Optimal Comfort and Wellbeing  Outcome: Progressing  Intervention: Monitor Pain and Promote Comfort  Recent Flowsheet Documentation  Taken 11/4/2024 0050 by Faith Bentio RN  Pain Management Interventions: distraction     Problem: Pain Acute  Goal: Optimal Pain Control and Function  Outcome: Progressing  Intervention: Develop Pain Management Plan  Recent Flowsheet Documentation  Taken 11/4/2024 0050 by Faith Benito RN  Pain Management Interventions: distraction  Intervention: Prevent or Manage Pain  Recent Flowsheet Documentation  Taken 11/4/2024 0050 by Faith Benito RN  Medication Review/Management: medications reviewed     Problem: Pain Acute  Goal: Optimal Pain Control and Function  Intervention: Prevent or Manage Pain  Recent Flowsheet Documentation  Taken 11/4/2024 0050 by Faith Benito RN  Medication Review/Management: medications reviewed     Problem: Thrombolytic Therapy  Goal: Absence of Bleeding  Outcome:  "Progressing     Patient A&O x 4. Pain 4/10   Heparin protocol   abdomen pain oxycodone did not help   Tele shows NSR   Xanax helps for sleeping   /71 (BP Location: Right arm)   Pulse 62   Temp 97  F (36.1  C) (Oral)   Resp 18   Ht 1.626 m (5' 4.02\")   Wt 87.1 kg (192 lb 0.3 oz)   SpO2 96%   BMI 32.94 kg/m      Faith Benito RN on 11/4/2024 at 5:48 AM      "

## 2024-11-04 NOTE — PLAN OF CARE
Goal Outcome Evaluation:      Plan of Care Reviewed With: patient  Pt's pain is managed per PRN Tylenol and Dilaudid.    Spoke to House Officer, Dr. Arielle Veliz per pt's request regarding discontinued Heparin IV to take oral Heparin. Communicated with pt explaining both Dr. Veliz and I have not seen any new orders to discontinued Heparin IV. Wrote pt's questions on communication board and encouraged pt to communicate concerns with provider tomorrow during morning visit.        Problem: Adult Inpatient Plan of Care  Goal: Optimal Comfort and Wellbeing  Outcome: Progressing  Intervention: Monitor Pain and Promote Comfort  Recent Flowsheet Documentation  Taken 11/3/2024 2210 by Cyndi Davis RN  Pain Management Interventions: distraction  Taken 11/3/2024 2150 by Cyndi Davis RN  Pain Management Interventions: medication (see MAR)  Taken 11/3/2024 2145 by Cyndi Davis, JUAN  Pain Management Interventions: distraction  Taken 11/3/2024 2131 by Cyndi Davis, JUAN  Pain Management Interventions: medication (see MAR)  Taken 11/3/2024 1715 by Cyndi Davis RN  Pain Management Interventions: distraction  Taken 11/3/2024 1658 by Cyndi Davis RN  Pain Management Interventions: medication (see MAR)

## 2024-11-04 NOTE — DISCHARGE INSTRUCTIONS
Avoid over the counter NSAIDs, such as ibuprofen, Aleve, and motrin, while you are taking blood thinners.

## 2024-11-04 NOTE — PROGRESS NOTES
New Patient Oncology Nurse Navigator Note     Referral Received: 11/04/24      Referring provider:   Lars Trevino MD     Referring Clinic/Organization: [facility]     Referred to: Benign Hematology    Requested provider (if applicable): Dr. 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  Thrombosis involving intra-abdominal veins: Postoperative in nature this could be considered provoked.  Would recommend 6 months of anticoagulation.  At that point she can revisit with hematology to discuss the possibility of indefinite anticoagulation.  She does not need a thrombophilia evaluation.  Risk factor modification would include smoking cessation and weight loss.  Would avoid estrogen containing medications in the future.  I met with the patient and her .  Multiple questions were answered I believe to their satisfaction.  Will arrange follow-up after discharge.       Records Location: [facility]     Additional testing needed prior to consult:     ?    Referral updates and Plan:     11/04/2024 1:28 PM -  Referral received and reviewed. Per referral, sent to [employer] to schedule in 30 minute return slot in 3 months.    Liana London, DELISAN, RN  Hematology/Oncology Nurse Navigator  [facility]  [phone] / [phone]

## 2024-11-07 ENCOUNTER — HOSPITAL ENCOUNTER (EMERGENCY)
Facility: HOSPITAL | Age: 44
Discharge: HOME | End: 2024-11-07
Attending: EMERGENCY MEDICINE | Admitting: EMERGENCY MEDICINE
Payer: COMMERCIAL

## 2024-11-07 ENCOUNTER — APPOINTMENT (OUTPATIENT)
Dept: CT IMAGING | Facility: HOSPITAL | Age: 44
End: 2024-11-07
Attending: EMERGENCY MEDICINE
Payer: COMMERCIAL

## 2024-11-07 VITALS
TEMPERATURE: 97.5 F | RESPIRATION RATE: 18 BRPM | HEART RATE: 71 BPM | HEIGHT: 64 IN | DIASTOLIC BLOOD PRESSURE: 83 MMHG | WEIGHT: 190 LBS | SYSTOLIC BLOOD PRESSURE: 129 MMHG | BODY MASS INDEX: 32.44 KG/M2 | OXYGEN SATURATION: 96 %

## 2024-11-07 DIAGNOSIS — I82.90 VENOUS THROMBOEMBOLISM: ICD-10-CM

## 2024-11-07 DIAGNOSIS — R10.32 ABDOMINAL PAIN, LEFT LOWER QUADRANT: ICD-10-CM

## 2024-11-07 LAB
ALBUMIN SERPL BCG-MCNC: 3.9 G/DL (ref 3.5–5.2)
ALBUMIN UR-MCNC: NEGATIVE MG/DL
ALP SERPL-CCNC: 53 U/L (ref 40–150)
ALT SERPL W P-5'-P-CCNC: 60 U/L (ref 0–50)
ANION GAP SERPL CALCULATED.3IONS-SCNC: 12 MMOL/L (ref 7–15)
APPEARANCE UR: CLEAR
AST SERPL W P-5'-P-CCNC: 55 U/L (ref 0–45)
BACTERIA #/AREA URNS HPF: ABNORMAL /HPF
BASOPHILS # BLD AUTO: 0.1 10E3/UL (ref 0–0.2)
BASOPHILS NFR BLD AUTO: 1 %
BILIRUB DIRECT SERPL-MCNC: <0.2 MG/DL (ref 0–0.3)
BILIRUB SERPL-MCNC: 0.4 MG/DL
BILIRUB UR QL STRIP: NEGATIVE
BUN SERPL-MCNC: 10.4 MG/DL (ref 6–20)
CALCIUM SERPL-MCNC: 9.3 MG/DL (ref 8.8–10.4)
CHLORIDE SERPL-SCNC: 104 MMOL/L (ref 98–107)
COLOR UR AUTO: COLORLESS
CREAT SERPL-MCNC: 0.72 MG/DL (ref 0.51–0.95)
EGFRCR SERPLBLD CKD-EPI 2021: >90 ML/MIN/1.73M2
EOSINOPHIL # BLD AUTO: 0 10E3/UL (ref 0–0.7)
EOSINOPHIL NFR BLD AUTO: 0 %
ERYTHROCYTE [DISTWIDTH] IN BLOOD BY AUTOMATED COUNT: 12.1 % (ref 10–15)
GLUCOSE SERPL-MCNC: 80 MG/DL (ref 70–99)
GLUCOSE UR STRIP-MCNC: NEGATIVE MG/DL
HCO3 SERPL-SCNC: 22 MMOL/L (ref 22–29)
HCT VFR BLD AUTO: 36.3 % (ref 35–47)
HGB BLD-MCNC: 12 G/DL (ref 11.7–15.7)
HGB UR QL STRIP: ABNORMAL
HOLD SPECIMEN: NORMAL
IMM GRANULOCYTES # BLD: 0.1 10E3/UL
IMM GRANULOCYTES NFR BLD: 1 %
KETONES UR STRIP-MCNC: NEGATIVE MG/DL
LACTATE SERPL-SCNC: 0.6 MMOL/L (ref 0.7–2)
LEUKOCYTE ESTERASE UR QL STRIP: NEGATIVE
LIPASE SERPL-CCNC: 92 U/L (ref 13–60)
LYMPHOCYTES # BLD AUTO: 1.9 10E3/UL (ref 0.8–5.3)
LYMPHOCYTES NFR BLD AUTO: 25 %
MCH RBC QN AUTO: 29.1 PG (ref 26.5–33)
MCHC RBC AUTO-ENTMCNC: 33.1 G/DL (ref 31.5–36.5)
MCV RBC AUTO: 88 FL (ref 78–100)
MONOCYTES # BLD AUTO: 0.7 10E3/UL (ref 0–1.3)
MONOCYTES NFR BLD AUTO: 9 %
NEUTROPHILS # BLD AUTO: 4.7 10E3/UL (ref 1.6–8.3)
NEUTROPHILS NFR BLD AUTO: 64 %
NITRATE UR QL: NEGATIVE
NRBC # BLD AUTO: 0 10E3/UL
NRBC BLD AUTO-RTO: 0 /100
PH UR STRIP: 5.5 [PH] (ref 5–7)
PLATELET # BLD AUTO: 423 10E3/UL (ref 150–450)
POTASSIUM SERPL-SCNC: 4.6 MMOL/L (ref 3.4–5.3)
PROT SERPL-MCNC: 6.9 G/DL (ref 6.4–8.3)
RBC # BLD AUTO: 4.12 10E6/UL (ref 3.8–5.2)
RBC URINE: <1 /HPF
SODIUM SERPL-SCNC: 138 MMOL/L (ref 135–145)
SP GR UR STRIP: 1.01 (ref 1–1.03)
SQUAMOUS EPITHELIAL: 1 /HPF
UROBILINOGEN UR STRIP-MCNC: <2 MG/DL
WBC # BLD AUTO: 7.4 10E3/UL (ref 4–11)
WBC URINE: <1 /HPF

## 2024-11-07 PROCEDURE — 36415 COLL VENOUS BLD VENIPUNCTURE: CPT | Performed by: EMERGENCY MEDICINE

## 2024-11-07 PROCEDURE — 96376 TX/PRO/DX INJ SAME DRUG ADON: CPT | Mod: 59

## 2024-11-07 PROCEDURE — 99285 EMERGENCY DEPT VISIT HI MDM: CPT | Mod: 25

## 2024-11-07 PROCEDURE — 83690 ASSAY OF LIPASE: CPT | Performed by: EMERGENCY MEDICINE

## 2024-11-07 PROCEDURE — 80048 BASIC METABOLIC PNL TOTAL CA: CPT | Performed by: EMERGENCY MEDICINE

## 2024-11-07 PROCEDURE — 250N000011 HC RX IP 250 OP 636: Performed by: EMERGENCY MEDICINE

## 2024-11-07 PROCEDURE — 82248 BILIRUBIN DIRECT: CPT | Performed by: EMERGENCY MEDICINE

## 2024-11-07 PROCEDURE — 83605 ASSAY OF LACTIC ACID: CPT | Performed by: EMERGENCY MEDICINE

## 2024-11-07 PROCEDURE — 82947 ASSAY GLUCOSE BLOOD QUANT: CPT | Performed by: EMERGENCY MEDICINE

## 2024-11-07 PROCEDURE — 81001 URINALYSIS AUTO W/SCOPE: CPT | Performed by: EMERGENCY MEDICINE

## 2024-11-07 PROCEDURE — 96374 THER/PROPH/DIAG INJ IV PUSH: CPT | Mod: 59

## 2024-11-07 PROCEDURE — 74174 CTA ABD&PLVS W/CONTRAST: CPT

## 2024-11-07 PROCEDURE — 85004 AUTOMATED DIFF WBC COUNT: CPT | Performed by: EMERGENCY MEDICINE

## 2024-11-07 RX ORDER — HYDROMORPHONE HYDROCHLORIDE 2 MG/1
2 TABLET ORAL EVERY 6 HOURS PRN
Qty: 12 TABLET | Refills: 0 | Status: SHIPPED | OUTPATIENT
Start: 2024-11-07

## 2024-11-07 RX ORDER — IOPAMIDOL 755 MG/ML
90 INJECTION, SOLUTION INTRAVASCULAR ONCE
Status: COMPLETED | OUTPATIENT
Start: 2024-11-07 | End: 2024-11-07

## 2024-11-07 RX ADMIN — IOPAMIDOL 90 ML: 755 INJECTION, SOLUTION INTRAVENOUS at 18:10

## 2024-11-07 RX ADMIN — HYDROMORPHONE HYDROCHLORIDE 1 MG: 1 INJECTION, SOLUTION INTRAMUSCULAR; INTRAVENOUS; SUBCUTANEOUS at 18:52

## 2024-11-07 RX ADMIN — HYDROMORPHONE HYDROCHLORIDE 1 MG: 1 INJECTION, SOLUTION INTRAMUSCULAR; INTRAVENOUS; SUBCUTANEOUS at 16:44

## 2024-11-07 ASSESSMENT — ACTIVITIES OF DAILY LIVING (ADL)
ADLS_ACUITY_SCORE: 0

## 2024-11-07 ASSESSMENT — COLUMBIA-SUICIDE SEVERITY RATING SCALE - C-SSRS
1. IN THE PAST MONTH, HAVE YOU WISHED YOU WERE DEAD OR WISHED YOU COULD GO TO SLEEP AND NOT WAKE UP?: NO
6. HAVE YOU EVER DONE ANYTHING, STARTED TO DO ANYTHING, OR PREPARED TO DO ANYTHING TO END YOUR LIFE?: NO
2. HAVE YOU ACTUALLY HAD ANY THOUGHTS OF KILLING YOURSELF IN THE PAST MONTH?: NO

## 2024-11-07 ASSESSMENT — ENCOUNTER SYMPTOMS
FEVER: 0
DIAPHORESIS: 1
VOMITING: 0

## 2024-11-07 NOTE — ED TRIAGE NOTES
Pt presents with LLQ pain. Pt had a colectomy 15 days ago and was recently hospitalized for another post op complication. Pt was advised by surgical team to come to ED.     Triage Assessment (Adult)       Row Name 11/07/24 1453          Triage Assessment    Airway WDL WDL        Respiratory WDL    Respiratory WDL WDL        Skin Circulation/Temperature WDL    Skin Circulation/Temperature WDL WDL        Cardiac WDL    Cardiac WDL WDL        Peripheral/Neurovascular WDL    Peripheral Neurovascular WDL WDL        Cognitive/Neuro/Behavioral WDL    Cognitive/Neuro/Behavioral WDL WDL

## 2024-11-07 NOTE — ED PROVIDER NOTES
Emergency Department Encounter     Evaluation Date & Time:   11/7/2024  3:30 PM    CHIEF COMPLAINT:  Post-op Problem and Abdominal Pain (LLQ)      Triage Note:Pt presents with LLQ pain. Pt had a colectomy 15 days ago and was recently hospitalized for another post op complication. Pt was advised by surgical team to come to ED.     Triage Assessment (Adult)       Row Name 11/07/24 1453          Triage Assessment    Airway WDL WDL        Respiratory WDL    Respiratory WDL WDL        Skin Circulation/Temperature WDL    Skin Circulation/Temperature WDL WDL        Cardiac WDL    Cardiac WDL WDL        Peripheral/Neurovascular WDL    Peripheral Neurovascular WDL WDL        Cognitive/Neuro/Behavioral WDL    Cognitive/Neuro/Behavioral WDL WDL                         FINAL IMPRESSION:  No diagnosis found.    Impression and Plan     ED COURSE & MEDICAL DECISION MAKING:    3:48 PM I met with the patient, obtained history, performed an initial exam, and discussed options and plan for diagnostics and treatment here in the ED.   4:02 PM I spoke with Dr. Baez, [facility].       ED Course as of 11/07/24 1802   Thu Nov 07, 2024   1604 I reviewed her most recent ER visit, hospital course, and discharge summary as well as recent imaging.  I discussed the case with the on-call physician at [facility] for colorectal surgery.  We discussed her course so far postoperatively and plan to do repeat CTA today.  This new sharp pain in her left lower quadrant with stooling or with sneezing I think may actually be related to her abdominal wall fluid collection, but in the setting of recent portal vein and inferior mesenteric vein DVTs now on blood thinners certainly do need repeat imaging to look and see if there is any evidence of developing ischemia of the intestines, psoas muscle hematoma from the blood thinner, or new bleeding related to the blood thinner postsurgically.  No signs of cellulitis on her abdominal wall to suggest that  "the hematoma is becoming infected but certainly still is a consideration early on that this might be early infection.  No new chest complaints today.   1759 Patient signed out to Dr Burns to follow up on ct results and if not showing ischemia, maybe pain today from abd wall hematoma/seroma and could discharge home with her dilaudid, but if abnormal discuss with her surgeon team on call (whom we already discussed plan with earlier today).       At the conclusion of the encounter I discussed the results of all the tests and the disposition. The questions were answered. The patient or family acknowledged understanding and was agreeable with the care plan.          0 minutes of critical care time        MEDICATIONS GIVEN IN THE EMERGENCY DEPARTMENT:  Medications   HYDROmorphone (DILAUDID) injection 1 mg (1 mg Intravenous $Given 11/7/24 3871)   iopamidol (ISOVUE-370) solution 90 mL (has no administration in time range)       NEW PRESCRIPTIONS STARTED AT TODAY'S ED VISIT:  New Prescriptions    No medications on file       HPI     HPI     Anisa Love is a 44 year old female with a pertinent history of redundant colon, constipation, irritable bowel syndrome, hypertension, dyssynergy of pelvic floor, endometriosis of pelvis and tobacco use who presents to this ED by walk-in for evaluation of abdominal pain.     The patient had a colectomy on 10/22 at the [facility]. Since last week, she has had worsening epigastric abdominal pain in addition to pelvic pressure. She was seen in this emergency department last Saturday (11/02) and found to have blood clots in her epigastrium on a CT scan. Since then, the pelvic pressure has become more severe and she has developed left lower quadrant abdominal pain. The patient states that it is difficult to have bowel movements, and that her body \"guards\" when she tries to do so. She was given hydromorphone and dilaudid after her procedure and took her last dose of the latter at around 1:45 " PM today (11/07). She also endorses some night sweats. Her incisions from the procedure have been healing well.     She denies vomiting or having a fever.     Per Chart Review, the patient was admitted to [facility] from 11/02/2024-11/04/2024 with intraabdominal venous thrombosis. A CT scan of the abdomen showed inferior mesenteric, splenic, right portal, and extensive intrahepatic portal vein branch thrombosis. There was subcutaneous fluid collection likely representing a hematoma/seroma. The patient was noted to have a family history of thrombosis. She was started on a heparin drip in the emergency department. Colorectal surgery recommended no further intervention. Hematology concluded that her venous thrombosis was provoked in nature and that thrombophilia workup was not necessary. She was recommended to have anticoagulation for six months. She was transitioned to Eliquis during her hospital stay. Additionally, the patient had an ultrasound that revealed superficial thrombophlebitis. She complained of left arm pain; a repeat ultrasound showed acute SVT involving the left cephalic vein in the forearm and no DVT in the left upper extremity. She was continued on Amiodarone and Losartan.    Per Chart Review, the patient was admitted to the [facility] on 10/22/2024. She had a hand-assisted laparoscopic subtotal colectomy with anastomosis on 10/22/2024. Colonic wall was without diagnostic abnormality. The appendix was unremarkable. There was no dysplasia. The incision was dry and intact without signs of erythema or drainage at discharge. There were no drains in place at discharge. The patient was diagnosed with ileus and had an NG tube placed for decompression. This was removed after successful clamping trials and her diet was advanced.       REVIEW OF SYSTEMS:  Review of Systems   Constitutional:  Positive for diaphoresis. Negative for fever.   Gastrointestinal:  Negative for vomiting.         Positive for left lower quadrant abdominal pain and pelvic pressure.   All other systems reviewed and are negative.    remainder of systems are all otherwise negative.        Medical History     Past Medical History:   Diagnosis Date    Depression     GERD (gastroesophageal reflux disease)     Gonorrhea     HPV (human papilloma virus) anogenital infection     Hypertension     Mental disorder     Motion sickness     Noninfectious ileitis     Other chronic pain 2023    PONV (postoperative nausea and vomiting)     Sleep apnea     TMJ (temporomandibular joint syndrome)     Ulcerative colitis (H)     Venous thromboembolism 2024       Past Surgical History:   Procedure Laterality Date     SECTION N/A 2019    Procedure:  SECTION;  Surgeon: Magi Ayala MD;  Location: [facility];  Service: Obstetrics    HYSTEROSCOPY, WITH ENDOMETRIAL RADIOFREQUENCY ABLATION - NOVASURE  2023    Procedure: HYSTEROSCOPY with DILATION AND CURETTAGE, DIAGNOSTIC LAPAROSCOPY, BILATERAL LAPAROSCOPIC SALPINGECTOMY;  Surgeon: Anitha Archuleta DO;  Location: [facility] OR    HYSTEROSCOPY, WITH ENDOMETRIAL RADIOFREQUENCY ABLATION - NOVASURE N/A 3/8/2024    Procedure: HYSTEROSCOPY, WITH ENDOMETRIAL ABLATION;  Surgeon: Anitha Archuleta DO;  Location: [facility] OR    LAPAROSCOPIC CHOLECYSTECTOMY  2019    LAPAROSCOPY DIAGNOSTIC (GYN) N/A 8/3/2022    Procedure: DIAGNOSTIC LAPAROSCOPY, excision of endometrial cysts and cauterizaton of endometrial cysts;  Surgeon: Minerva York MD;  Location: [facility] OR    WISDOM TOOTH EXTRACTION         No family history on file.    Social History     Tobacco Use    Smoking status: Some Days     Current packs/day: 0.20     Types: Cigarettes    Smokeless tobacco: Never   Substance Use Topics    Alcohol use: Yes     Comment: Occasional    Drug use: Yes     Types: Marijuana       acetaminophen (TYLENOL) 325 MG tablet  acyclovir (ZOVIRAX) 400  "MG tablet  ALPRAZolam (XANAX) 0.5 MG tablet  amLODIPine (NORVASC) 10 MG tablet  apixaban ANTICOAGULANT (ELIQUIS) 5 MG tablet  eszopiclone (LUNESTA) 3 MG tablet  HYDROmorphone (DILAUDID) 2 MG tablet  losartan (COZAAR) 50 MG tablet  ondansetron (ZOFRAN ODT) 4 MG ODT tab  prochlorperazine (COMPAZINE) 5 MG tablet  senna-docusate (SENOKOT-S/PERICOLACE) 8.6-50 MG tablet  venlafaxine (EFFEXOR XR) 150 MG 24 hr capsule        Physical Exam     First Vitals:  Patient Vitals for the past 24 hrs:   BP Temp Temp src Pulse Resp SpO2 Height Weight   11/07/24 1452 129/85 97.5  F (36.4  C) Temporal 78 20 99 % 1.626 m (5' 4\") 86.2 kg (190 lb)       PHYSICAL EXAM:   Constitutional:   in nad sitting up in bed   HENT:  Normocephalic, posterior pharynx wnl, mucous membranes moist and dark pink   Eyes:  PERRL, EOMI, Conjunctiva normal, No discharge, no scleral icterus.  Respiratory:  Breathing easily, cta  Cardiovascular:  rrr nl s1s2 0 murmurs, rubs, or gallops.  Peripheral pulses dp, pt, and radial are wnl.  no peripheral edema   GI:  Bowel sounds normal, Soft, nondistended.  Light palpation of abd wall will radiate towards llq, but soft abd wall.  Musculoskeletal:  Moves all extremities.  No erythematous or swollen major joints,   Integument:  lap wounds healing well really without any residual redness, or ecchymosis noticeable.  No increased warmth. Healing well.  Neurologic:  Alert & oriented x 3, Normal motor function, Normal sensory function, No focal deficits noted. Normal speech.  Psychiatric:  Affect normal, Judgment normal, Mood normal.     Results     LAB AND RADIOLOGY:  All pertinent labs reviewed and interpreted  Results for orders placed or performed during the hospital encounter of 11/07/24   Basic metabolic panel     Status: Normal   Result Value Ref Range    Sodium 138 135 - 145 mmol/L    Potassium 4.6 3.4 - 5.3 mmol/L    Chloride 104 98 - 107 mmol/L    Carbon Dioxide (CO2) 22 22 - 29 mmol/L    Anion Gap 12 7 - 15 mmol/L "    Urea Nitrogen 10.4 6.0 - 20.0 mg/dL    Creatinine 0.72 0.51 - 0.95 mg/dL    GFR Estimate >90 >60 mL/min/1.73m2    Calcium 9.3 8.8 - 10.4 mg/dL    Glucose 80 70 - 99 mg/dL   CBC with platelets and differential     Status: None   Result Value Ref Range    WBC Count 7.4 4.0 - 11.0 10e3/uL    RBC Count 4.12 3.80 - 5.20 10e6/uL    Hemoglobin 12.0 11.7 - 15.7 g/dL    Hematocrit 36.3 35.0 - 47.0 %    MCV 88 78 - 100 fL    MCH 29.1 26.5 - 33.0 pg    MCHC 33.1 31.5 - 36.5 g/dL    RDW 12.1 10.0 - 15.0 %    Platelet Count 423 150 - 450 10e3/uL    % Neutrophils 64 %    % Lymphocytes 25 %    % Monocytes 9 %    % Eosinophils 0 %    % Basophils 1 %    % Immature Granulocytes 1 %    NRBCs per 100 WBC 0 <1 /100    Absolute Neutrophils 4.7 1.6 - 8.3 10e3/uL    Absolute Lymphocytes 1.9 0.8 - 5.3 10e3/uL    Absolute Monocytes 0.7 0.0 - 1.3 10e3/uL    Absolute Eosinophils 0.0 0.0 - 0.7 10e3/uL    Absolute Basophils 0.1 0.0 - 0.2 10e3/uL    Absolute Immature Granulocytes 0.1 <=0.4 10e3/uL    Absolute NRBCs 0.0 10e3/uL   CBC with platelets differential     Status: None    Narrative    The following orders were created for panel order CBC with platelets differential.  Procedure                               Abnormality         Status                     ---------                               -----------         ------                     CBC with platelets and d...[[id number]]                      Final result                 Please view results for these tests on the individual orders.       [facility] Documentation     Medical Decision Making  Obtained supplemental history:Supplemental history obtained?: No  Reviewed external records: External records reviewed?: Documented in chart and Inpatient Record: 10/22/2024 [facility] Admission  Care impacted by chronic illness:Hypertension, Smoking / Nicotine Use, and Other: Redundant colon, IBS  Did you consider but not order tests?: Work up considered but not performed and  documented in chart, if applicable  Did you interpret images independently?: Independent interpretation of ECG and images noted in documentation, when applicable.  Consultation discussion with other provider:Did you involve another provider (consultant, , pharmacy, etc.)?: I discussed the care with another health care provider, see documentation for details.  Admission considered. Patient was signed out to the oncoming physician, disposition pending.    The creation of this record is based on the scribe s observations of the work being performed by Ashlee Ibarra and the provider s statements to them. This document has been checked and approved by MD Gilda Sanders MD  Emergency Medicine  [facility] EMERGENCY DEPARTMENT         Gilda Shell MD  11/07/24 7688

## 2024-11-08 NOTE — DISCHARGE INSTRUCTIONS
The abdominal CT scan, urinalysis, and laboratory test all appear reassuring here tonight in the emergency department.  Continue taking the prescribed hydromorphone as needed for any further pain.  Please follow-up with your colorectal surgeon for reevaluation.  Return back to ED sooner for any worsening abdominal pain or any other new or concerning symptoms.

## 2024-11-08 NOTE — ED NOTES
EMERGENCY DEPARTMENT SIGN OUT NOTE        IMPRESSION  1. Abdominal pain, left lower quadrant    2. Venous thromboembolism          ED COURSE AND MEDICAL DECISION MAKING  Patient was signed out to me by Dr Gilda Shell at 18:00    In brief, Anisa Love is a 44-year-old female who is approximately 2 weeks status post subtotal colectomy and recent mesenteric/splenic/portal vein thrombosis started on Eliquis who initially presented to the ED for evaluation of left lower quadrant abdominal pain.       At time of sign out, labs and abdominal CT scan were pending.      Initial labs were reviewed.  CBC and BMP were both unremarkable.    CT scan of the abdomen shows mild ongoing edema and ascites around the anastomosis site.  The anterior abdominal wall fluid collection has decreased in size.  There were no other findings to explain the patient's left lower quadrant pain.       Additional labs including hepatic panel, lactic acid, urinalysis, and lipase were reassuring.    The patient was reevaluated and informed of the reassuring lab and abdominal CT scan results.  After educating and reassure the patient she felt comfortable returning home.  The patient was sent home with a small refill of her home Dilaudid to take as needed for any further pain.  She was instructed to follow-up with her colorectal surgeon at [facility] for further evaluation.  The patient was instructed to return back to ED sooner for any worsening abdominal pain, vomiting, fevers, or any other new or concerning symptoms.      ED MEDS  Medications   HYDROmorphone (DILAUDID) injection 1 mg (1 mg Intravenous $Given 11/7/24 1852)   iopamidol (ISOVUE-370) solution 90 mL (90 mLs Intravenous $Given 11/7/24 1810)       LAB  Labs Ordered and Resulted from Time of ED Arrival to Time of ED Departure   HEPATIC FUNCTION PANEL - Abnormal       Result Value    Protein Total 6.9      Albumin 3.9      Bilirubin Total 0.4      Alkaline Phosphatase 53      AST 55 (*)     ALT  60 (*)     Bilirubin Direct <0.20     LIPASE - Abnormal    Lipase 92 (*)    LACTIC ACID WHOLE BLOOD WITH 1X REPEAT IN 2 HR WHEN >2 - Abnormal    Lactic Acid, Initial 0.6 (*)    ROUTINE UA WITH MICROSCOPIC REFLEX TO CULTURE - Abnormal    Color Urine Colorless      Appearance Urine Clear      Glucose Urine Negative      Bilirubin Urine Negative      Ketones Urine Negative      Specific Gravity Urine 1.010      Blood Urine 0.03 mg/dL (*)     pH Urine 5.5      Protein Albumin Urine Negative      Urobilinogen Urine <2.0      Nitrite Urine Negative      Leukocyte Esterase Urine Negative      Bacteria Urine Few (*)     RBC Urine <1      WBC Urine <1      Squamous Epithelials Urine 1     BASIC METABOLIC PANEL - Normal    Sodium 138      Potassium 4.6      Chloride 104      Carbon Dioxide (CO2) 22      Anion Gap 12      Urea Nitrogen 10.4      Creatinine 0.72      GFR Estimate >90      Calcium 9.3      Glucose 80     CBC WITH PLATELETS AND DIFFERENTIAL    WBC Count 7.4      RBC Count 4.12      Hemoglobin 12.0      Hematocrit 36.3      MCV 88      MCH 29.1      MCHC 33.1      RDW 12.1      Platelet Count 423      % Neutrophils 64      % Lymphocytes 25      % Monocytes 9      % Eosinophils 0      % Basophils 1      % Immature Granulocytes 1      NRBCs per 100 WBC 0      Absolute Neutrophils 4.7      Absolute Lymphocytes 1.9      Absolute Monocytes 0.7      Absolute Eosinophils 0.0      Absolute Basophils 0.1      Absolute Immature Granulocytes 0.1      Absolute NRBCs 0.0             RADIOLOGY    CTA Abdomen Pelvis with Contrast   Final Result   IMPRESSION:   1.  No evidence for active bleeding in the abdomen or pelvis status post recent subtotal colectomy. Mild ongoing edema and small volume ascites around the ileorectal anastomosis.   2.  Small lower anterior abdominal wall fluid collection, decreased compared to 11/2/2024.   3.  Known portal venous system thrombosis, not well evaluated on arterial phase.          DISCHARGE  MEDS  Current Discharge Medication List          DO TAMMIE [facility] EMERGENCY DEPARTMENT  [facility] [facility] [id number] [phone]         Ilda Burns DO  11/07/24 2005

## 2024-11-11 ENCOUNTER — LAB REQUISITION (OUTPATIENT)
Dept: LAB | Facility: CLINIC | Age: 44
End: 2024-11-11

## 2024-11-11 DIAGNOSIS — R31.29 OTHER MICROSCOPIC HEMATURIA: ICD-10-CM

## 2024-11-11 DIAGNOSIS — R74.8 ABNORMAL LEVELS OF OTHER SERUM ENZYMES: ICD-10-CM

## 2024-11-11 LAB — LIPASE SERPL-CCNC: 33 U/L (ref 13–60)

## 2024-11-11 PROCEDURE — 83690 ASSAY OF LIPASE: CPT | Performed by: FAMILY MEDICINE

## 2024-11-11 PROCEDURE — 87086 URINE CULTURE/COLONY COUNT: CPT | Performed by: FAMILY MEDICINE

## 2024-11-12 LAB — BACTERIA UR CULT: NO GROWTH

## 2024-11-18 ENCOUNTER — LAB REQUISITION (OUTPATIENT)
Dept: LAB | Facility: CLINIC | Age: 44
End: 2024-11-18

## 2024-11-18 DIAGNOSIS — R35.0 FREQUENCY OF MICTURITION: ICD-10-CM

## 2024-11-18 DIAGNOSIS — R74.8 ABNORMAL LEVELS OF OTHER SERUM ENZYMES: ICD-10-CM

## 2024-11-18 DIAGNOSIS — I10 ESSENTIAL (PRIMARY) HYPERTENSION: ICD-10-CM

## 2024-11-18 PROCEDURE — 82565 ASSAY OF CREATININE: CPT | Performed by: FAMILY MEDICINE

## 2024-11-18 PROCEDURE — 83690 ASSAY OF LIPASE: CPT | Performed by: FAMILY MEDICINE

## 2024-11-18 PROCEDURE — 87086 URINE CULTURE/COLONY COUNT: CPT | Performed by: FAMILY MEDICINE

## 2024-11-18 PROCEDURE — 84075 ASSAY ALKALINE PHOSPHATASE: CPT | Performed by: FAMILY MEDICINE

## 2024-11-18 PROCEDURE — 82435 ASSAY OF BLOOD CHLORIDE: CPT | Performed by: FAMILY MEDICINE

## 2024-11-19 LAB
ALBUMIN SERPL BCG-MCNC: 4.5 G/DL (ref 3.5–5.2)
ALP SERPL-CCNC: 46 U/L (ref 40–150)
ALT SERPL W P-5'-P-CCNC: 70 U/L (ref 0–50)
ANION GAP SERPL CALCULATED.3IONS-SCNC: 13 MMOL/L (ref 7–15)
AST SERPL W P-5'-P-CCNC: 34 U/L (ref 0–45)
BACTERIA UR CULT: NO GROWTH
BILIRUB SERPL-MCNC: 0.8 MG/DL
BUN SERPL-MCNC: 8.7 MG/DL (ref 6–20)
CALCIUM SERPL-MCNC: 9.7 MG/DL (ref 8.8–10.4)
CHLORIDE SERPL-SCNC: 103 MMOL/L (ref 98–107)
CREAT SERPL-MCNC: 0.7 MG/DL (ref 0.51–0.95)
EGFRCR SERPLBLD CKD-EPI 2021: >90 ML/MIN/1.73M2
GLUCOSE SERPL-MCNC: 107 MG/DL (ref 70–99)
HCO3 SERPL-SCNC: 22 MMOL/L (ref 22–29)
LIPASE SERPL-CCNC: 40 U/L (ref 13–60)
POTASSIUM SERPL-SCNC: 4.3 MMOL/L (ref 3.4–5.3)
PROT SERPL-MCNC: 7.1 G/DL (ref 6.4–8.3)
SODIUM SERPL-SCNC: 138 MMOL/L (ref 135–145)

## 2024-12-01 ENCOUNTER — HEALTH MAINTENANCE LETTER (OUTPATIENT)
Age: 44
End: 2024-12-01

## 2024-12-12 ENCOUNTER — LAB REQUISITION (OUTPATIENT)
Dept: LAB | Facility: CLINIC | Age: 44
End: 2024-12-12

## 2024-12-12 DIAGNOSIS — Z90.49 ACQUIRED ABSENCE OF OTHER SPECIFIED PARTS OF DIGESTIVE TRACT: ICD-10-CM

## 2024-12-12 LAB
ALBUMIN SERPL BCG-MCNC: 4.4 G/DL (ref 3.5–5.2)
ALP SERPL-CCNC: 68 U/L (ref 40–150)
ALT SERPL W P-5'-P-CCNC: 114 U/L (ref 0–50)
ANION GAP SERPL CALCULATED.3IONS-SCNC: 9 MMOL/L (ref 7–15)
AST SERPL W P-5'-P-CCNC: 152 U/L (ref 0–45)
BILIRUB SERPL-MCNC: 1 MG/DL
BUN SERPL-MCNC: 7.6 MG/DL (ref 6–20)
CALCIUM SERPL-MCNC: 9.1 MG/DL (ref 8.8–10.4)
CHLORIDE SERPL-SCNC: 105 MMOL/L (ref 98–107)
CREAT SERPL-MCNC: 0.86 MG/DL (ref 0.51–0.95)
EGFRCR SERPLBLD CKD-EPI 2021: 86 ML/MIN/1.73M2
GLUCOSE SERPL-MCNC: 106 MG/DL (ref 70–99)
HCO3 SERPL-SCNC: 25 MMOL/L (ref 22–29)
POTASSIUM SERPL-SCNC: 3.8 MMOL/L (ref 3.4–5.3)
PROT SERPL-MCNC: 6.9 G/DL (ref 6.4–8.3)
SODIUM SERPL-SCNC: 139 MMOL/L (ref 135–145)

## 2024-12-12 PROCEDURE — 82040 ASSAY OF SERUM ALBUMIN: CPT | Performed by: FAMILY MEDICINE

## 2024-12-12 PROCEDURE — 80053 COMPREHEN METABOLIC PANEL: CPT | Performed by: FAMILY MEDICINE

## 2024-12-23 ENCOUNTER — LAB REQUISITION (OUTPATIENT)
Dept: LAB | Facility: CLINIC | Age: 44
End: 2024-12-23

## 2024-12-23 DIAGNOSIS — R74.8 ABNORMAL LEVELS OF OTHER SERUM ENZYMES: ICD-10-CM

## 2024-12-23 LAB
ALBUMIN SERPL BCG-MCNC: 4.5 G/DL (ref 3.5–5.2)
ALP SERPL-CCNC: 40 U/L (ref 40–150)
ALT SERPL W P-5'-P-CCNC: 40 U/L (ref 0–50)
AST SERPL W P-5'-P-CCNC: 30 U/L (ref 0–45)
BILIRUB DIRECT SERPL-MCNC: 0.26 MG/DL (ref 0–0.3)
BILIRUB SERPL-MCNC: 0.9 MG/DL
PROT SERPL-MCNC: 7.1 G/DL (ref 6.4–8.3)

## 2024-12-23 PROCEDURE — 82247 BILIRUBIN TOTAL: CPT | Performed by: FAMILY MEDICINE

## 2024-12-23 PROCEDURE — 82040 ASSAY OF SERUM ALBUMIN: CPT | Performed by: FAMILY MEDICINE

## 2025-01-08 ENCOUNTER — LAB REQUISITION (OUTPATIENT)
Dept: LAB | Facility: CLINIC | Age: 45
End: 2025-01-08

## 2025-01-08 DIAGNOSIS — K52.9 NONINFECTIVE GASTROENTERITIS AND COLITIS, UNSPECIFIED: ICD-10-CM

## 2025-01-08 PROCEDURE — 87493 C DIFF AMPLIFIED PROBE: CPT | Performed by: FAMILY MEDICINE

## 2025-01-09 LAB — C DIFF TOX B STL QL: NEGATIVE

## 2025-02-01 ENCOUNTER — LAB REQUISITION (OUTPATIENT)
Dept: LAB | Facility: CLINIC | Age: 45
End: 2025-02-01

## 2025-02-01 DIAGNOSIS — J02.9 ACUTE PHARYNGITIS, UNSPECIFIED: ICD-10-CM

## 2025-02-01 PROCEDURE — 87081 CULTURE SCREEN ONLY: CPT | Performed by: STUDENT IN AN ORGANIZED HEALTH CARE EDUCATION/TRAINING PROGRAM

## 2025-02-03 LAB — BACTERIA SPEC CULT: NORMAL

## 2025-02-04 ENCOUNTER — APPOINTMENT (OUTPATIENT)
Dept: CT IMAGING | Facility: HOSPITAL | Age: 45
End: 2025-02-04
Attending: EMERGENCY MEDICINE
Payer: COMMERCIAL

## 2025-02-04 ENCOUNTER — HOSPITAL ENCOUNTER (EMERGENCY)
Facility: HOSPITAL | Age: 45
Discharge: HOME | End: 2025-02-04
Attending: EMERGENCY MEDICINE | Admitting: EMERGENCY MEDICINE
Payer: COMMERCIAL

## 2025-02-04 VITALS
BODY MASS INDEX: 32.1 KG/M2 | WEIGHT: 187 LBS | OXYGEN SATURATION: 99 % | TEMPERATURE: 97.7 F | HEART RATE: 78 BPM | RESPIRATION RATE: 18 BRPM | DIASTOLIC BLOOD PRESSURE: 74 MMHG | SYSTOLIC BLOOD PRESSURE: 112 MMHG

## 2025-02-04 DIAGNOSIS — I81 PORTAL VEIN THROMBOSIS: ICD-10-CM

## 2025-02-04 DIAGNOSIS — K64.5 THROMBOSED EXTERNAL HEMORRHOIDS: ICD-10-CM

## 2025-02-04 LAB
ANION GAP SERPL CALCULATED.3IONS-SCNC: 8 MMOL/L (ref 7–15)
BASOPHILS # BLD AUTO: 0 10E3/UL (ref 0–0.2)
BASOPHILS NFR BLD AUTO: 0 %
BUN SERPL-MCNC: 7.5 MG/DL (ref 6–20)
CALCIUM SERPL-MCNC: 9.2 MG/DL (ref 8.8–10.4)
CHLORIDE SERPL-SCNC: 105 MMOL/L (ref 98–107)
CREAT SERPL-MCNC: 0.72 MG/DL (ref 0.51–0.95)
EGFRCR SERPLBLD CKD-EPI 2021: >90 ML/MIN/1.73M2
EOSINOPHIL # BLD AUTO: 0 10E3/UL (ref 0–0.7)
EOSINOPHIL NFR BLD AUTO: 0 %
ERYTHROCYTE [DISTWIDTH] IN BLOOD BY AUTOMATED COUNT: 12.5 % (ref 10–15)
GLUCOSE SERPL-MCNC: 95 MG/DL (ref 70–99)
HCO3 SERPL-SCNC: 27 MMOL/L (ref 22–29)
HCT VFR BLD AUTO: 39.3 % (ref 35–47)
HGB BLD-MCNC: 13.7 G/DL (ref 11.7–15.7)
IMM GRANULOCYTES # BLD: 0 10E3/UL
IMM GRANULOCYTES NFR BLD: 1 %
LYMPHOCYTES # BLD AUTO: 1 10E3/UL (ref 0.8–5.3)
LYMPHOCYTES NFR BLD AUTO: 13 %
MCH RBC QN AUTO: 29.1 PG (ref 26.5–33)
MCHC RBC AUTO-ENTMCNC: 34.9 G/DL (ref 31.5–36.5)
MCV RBC AUTO: 83 FL (ref 78–100)
MONOCYTES # BLD AUTO: 0.7 10E3/UL (ref 0–1.3)
MONOCYTES NFR BLD AUTO: 8 %
NEUTROPHILS # BLD AUTO: 6 10E3/UL (ref 1.6–8.3)
NEUTROPHILS NFR BLD AUTO: 78 %
NRBC # BLD AUTO: 0 10E3/UL
NRBC BLD AUTO-RTO: 0 /100
PLAT MORPH BLD: NORMAL
PLATELET # BLD AUTO: 218 10E3/UL (ref 150–450)
POTASSIUM SERPL-SCNC: 4.5 MMOL/L (ref 3.4–5.3)
RBC # BLD AUTO: 4.71 10E6/UL (ref 3.8–5.2)
RBC MORPH BLD: NORMAL
SODIUM SERPL-SCNC: 140 MMOL/L (ref 135–145)
WBC # BLD AUTO: 7.7 10E3/UL (ref 4–11)

## 2025-02-04 PROCEDURE — 74177 CT ABD & PELVIS W/CONTRAST: CPT

## 2025-02-04 PROCEDURE — 82565 ASSAY OF CREATININE: CPT | Performed by: EMERGENCY MEDICINE

## 2025-02-04 PROCEDURE — 85025 COMPLETE CBC W/AUTO DIFF WBC: CPT | Performed by: EMERGENCY MEDICINE

## 2025-02-04 PROCEDURE — 250N000011 HC RX IP 250 OP 636: Performed by: EMERGENCY MEDICINE

## 2025-02-04 PROCEDURE — 96374 THER/PROPH/DIAG INJ IV PUSH: CPT | Mod: XU

## 2025-02-04 PROCEDURE — 36415 COLL VENOUS BLD VENIPUNCTURE: CPT | Performed by: EMERGENCY MEDICINE

## 2025-02-04 PROCEDURE — 250N000009 HC RX 250: Performed by: EMERGENCY MEDICINE

## 2025-02-04 PROCEDURE — 99285 EMERGENCY DEPT VISIT HI MDM: CPT | Mod: 25

## 2025-02-04 PROCEDURE — 80048 BASIC METABOLIC PNL TOTAL CA: CPT | Performed by: EMERGENCY MEDICINE

## 2025-02-04 PROCEDURE — 84132 ASSAY OF SERUM POTASSIUM: CPT | Performed by: EMERGENCY MEDICINE

## 2025-02-04 PROCEDURE — 271N000002 HC RX 271: Performed by: EMERGENCY MEDICINE

## 2025-02-04 RX ORDER — OXYCODONE HYDROCHLORIDE 5 MG/1
5 TABLET ORAL EVERY 6 HOURS PRN
Qty: 12 TABLET | Refills: 0 | Status: SHIPPED | OUTPATIENT
Start: 2025-02-04 | End: 2025-02-04

## 2025-02-04 RX ORDER — HYDROCODONE BITARTRATE AND ACETAMINOPHEN 5; 325 MG/1; MG/1
1 TABLET ORAL EVERY 6 HOURS PRN
Qty: 10 TABLET | Refills: 0 | Status: SHIPPED | OUTPATIENT
Start: 2025-02-04 | End: 2025-02-07

## 2025-02-04 RX ORDER — METHYLCELLULOSE 4000CPS 30 %
POWDER (GRAM) MISCELLANEOUS ONCE
Status: COMPLETED | OUTPATIENT
Start: 2025-02-04 | End: 2025-02-04

## 2025-02-04 RX ORDER — IOPAMIDOL 755 MG/ML
80 INJECTION, SOLUTION INTRAVASCULAR ONCE
Status: COMPLETED | OUTPATIENT
Start: 2025-02-04 | End: 2025-02-04

## 2025-02-04 RX ORDER — HYDROMORPHONE HYDROCHLORIDE 1 MG/ML
0.5 INJECTION, SOLUTION INTRAMUSCULAR; INTRAVENOUS; SUBCUTANEOUS ONCE
Status: COMPLETED | OUTPATIENT
Start: 2025-02-04 | End: 2025-02-04

## 2025-02-04 RX ADMIN — IOPAMIDOL 80 ML: 755 INJECTION, SOLUTION INTRAVENOUS at 10:34

## 2025-02-04 RX ADMIN — HYDROMORPHONE HYDROCHLORIDE 0.5 MG: 1 INJECTION, SOLUTION INTRAMUSCULAR; INTRAVENOUS; SUBCUTANEOUS at 09:11

## 2025-02-04 RX ADMIN — Medication 3 ML: at 09:11

## 2025-02-04 RX ADMIN — METHYLCELLULOSE: 2 POWDER, FOR SOLUTION ORAL at 09:12

## 2025-02-04 ASSESSMENT — ACTIVITIES OF DAILY LIVING (ADL)
ADLS_ACUITY_SCORE: 41

## 2025-02-04 ASSESSMENT — ENCOUNTER SYMPTOMS
FEVER: 0
RECTAL PAIN: 1
DIARRHEA: 1
SHORTNESS OF BREATH: 0
BLOOD IN STOOL: 0
CHILLS: 0

## 2025-02-04 NOTE — ED PROVIDER NOTES
"EMERGENCY DEPARTMENT ENCOUNTER      NAME: Anisa Love  AGE: 44 year old female  YOB: 1980  MRN: [id number]  EVALUATION DATE & TIME: 2/4/2025  8:17 AM    PCP: Tiffani Cassidy    ED PROVIDER: Milo Foreman MD      Chief Complaint   Patient presents with    Rectal/perineal Pain         FINAL IMPRESSION:  1. Thrombosed external hemorrhoids    2. Portal vein thrombosis          ED COURSE & MEDICAL DECISION MAKING:    Pertinent Labs & Imaging studies reviewed. (See chart for details)  44 year old female presents to the Emergency Department for evaluation of rectal pain in the setting of recent diarrhea.    On exam appears to have a thrombosed external hemorrhoid.  Abscess seems less likely.  Based on history will obtain CT imaging    Blood cell count normal.  Afebrile.  Metabolic panel normal.    ED Course as of 02/04/25 1201   Tue Feb 04, 2025   0842 Patient is here with rectal swelling.  Has chronic diarrhea ever since colectomy done for \"redundant colon\" through [facility].  Per chart review is ulcerative colitis   0842 Left upper quadrant pain since surgery.  Chronic diarrhea since surgery.  Recently had worsened diarrhea last night.  Developed painful mass last night   0842 On exam pink-colored likely external hemorrhoid at 7:00 anus   0843 With history of ulcerative colitis and colectomy and chronic left quadrant pain will obtain CT abdomen pelvis   0843 Perirectal abscess, perianal abscess seems less likely   0859 Rechecked and updated the patient.   0910 Ordered, let ordered, BMP, CBC   0910 Patient is on Eliquis secondary to abdominal vein thrombus.  Scheduled follow-up with hematology later this month   1156 We discussed the plan for discharge and the patient is agreeable. Reviewed supportive cares, symptomatic treatment, outpatient follow up, and reasons to return to the Emergency Department. Patient to be discharged by ED RN.    1156 Pains improved.  Patient is requesting hydrocodone versus " oxycodone   Improved with Dilaudid and left.  Plan for discharge home with recommendation use topical lidocaine, pain management, and follow-up with colorectal surgery        Medical Decision Making  Obtained supplemental history:Supplemental history obtained?: No  Reviewed external records: External records reviewed?: Inpatient Record: [facility] 10/22/24  Care impacted by chronic illness:Documented in Chart  Did you consider but not order tests?: Work up considered but not performed and documented in chart, if applicable  Did you interpret images independently?: Independent interpretation of ECG and images noted in documentation, when applicable.  Consultation discussion with other provider:Did you involve another provider (consultant, , pharmacy, etc.)?: No  Discharge. I prescribed additional prescription strength medication(s) as charted. See documentation for any additional details.    MIPS: Not Applicable            At the conclusion of the encounter I discussed the results of all of the tests and the disposition. The questions were answered. The patient or family acknowledged understanding and was agreeable with the care plan.     MEDICATIONS GIVEN IN THE EMERGENCY:  Medications   HYDROmorphone (PF) (DILAUDID) injection 0.5 mg (0.5 mg Intravenous $Given 2/4/25 0911)   lido-EPINEPHrine-tetracaine (LET) topical gel GEL (3 mLs Topical $Given 2/4/25 0911)   methylcellulose powder ( Topical $Given 2/4/25 0912)   iopamidol (ISOVUE-370) solution 80 mL (80 mLs Intravenous $Given 2/4/25 1034)       NEW PRESCRIPTIONS STARTED AT TODAY'S ER VISIT  New Prescriptions    HYDROCODONE-ACETAMINOPHEN (NORCO) 5-325 MG TABLET    Take 1 tablet by mouth every 6 hours as needed for severe pain.          =================================================================    TRIAGE ASSESSMENT:  Patient arrives by private car for evaluation of rectal pain since yesterday.  Reports she has diarrhea due to having her colon removed in  "October, she developed pain and \"bulge\" by her rectum yesterday.              HPI    Patient information was obtained from: Patient    Use of : N/A       Anisa Love is a 44 year old female with a pertinent history of HTN, IVS, ulcerative colitis, and s/p colectomy who presents to this ED by private car for evaluation of rectal pain.    The patient reports she had a total colectomy in October at [facility] for chronic constipation and extreme redundancy. She has a history of chronic LUQ abdominal pain that has been worse since the colectomy. Since the surgery she has had ongoing issues with diarrhea. She reports for the past couple days she has had increased diarrhea. Yesterday she noticed a lump in her rectal area. The lump is tender to touch and the pain is worse with any sort of movement. When she used a mirror to look at the lump it appeared skin colored. She has not noticed any bleeding from the rectum or lump. She has not had any fever or chills.      REVIEW OF SYSTEMS   Review of Systems   Constitutional:  Negative for chills and fever.   Respiratory:  Negative for shortness of breath.    Cardiovascular:  Negative for chest pain.   Gastrointestinal:  Positive for diarrhea and rectal pain. Negative for blood in stool.       PAST MEDICAL HISTORY:  Past Medical History:   Diagnosis Date    Depression     GERD (gastroesophageal reflux disease)     Gonorrhea     HPV (human papilloma virus) anogenital infection     Hypertension     Mental disorder     Motion sickness     Noninfectious ileitis     Other chronic pain 2023    PONV (postoperative nausea and vomiting)     Sleep apnea     Slight does not use a CPAP    TMJ (temporomandibular joint syndrome)     Pt is able to put 3 fingers stacked in mouth    Ulcerative colitis (H)     Venous thromboembolism 2024       PAST SURGICAL HISTORY:  Past Surgical History:   Procedure Laterality Date     SECTION N/A 2019    Procedure: "  SECTION;  Surgeon: Magi Ayala MD;  Location: [facility] OR;  Service: Obstetrics    HYSTEROSCOPY, WITH ENDOMETRIAL RADIOFREQUENCY ABLATION - NOVASURE  2023    Procedure: HYSTEROSCOPY with DILATION AND CURETTAGE, DIAGNOSTIC LAPAROSCOPY, BILATERAL LAPAROSCOPIC SALPINGECTOMY;  Surgeon: Anitha Archuleta DO;  Location: [facility] OR    HYSTEROSCOPY, WITH ENDOMETRIAL RADIOFREQUENCY ABLATION - NOVASURE N/A 3/8/2024    Procedure: HYSTEROSCOPY, WITH ENDOMETRIAL ABLATION;  Surgeon: Anitha Archuleta DO;  Location: [facility] OR    LAPAROSCOPIC CHOLECYSTECTOMY  2019    LAPAROSCOPY DIAGNOSTIC (GYN) N/A 8/3/2022    Procedure: DIAGNOSTIC LAPAROSCOPY, excision of endometrial cysts and cauterizaton of endometrial cysts;  Surgeon: Minerva York MD;  Location: [facility] OR    WISDOM TOOTH EXTRACTION             CURRENT MEDICATIONS:    acetaminophen (TYLENOL) 325 MG tablet  acyclovir (ZOVIRAX) 400 MG tablet  ALPRAZolam (XANAX) 0.5 MG tablet  amLODIPine (NORVASC) 10 MG tablet  apixaban ANTICOAGULANT (ELIQUIS) 5 MG tablet  eszopiclone (LUNESTA) 3 MG tablet  HYDROcodone-acetaminophen (NORCO) 5-325 MG tablet  HYDROmorphone (DILAUDID) 2 MG tablet  losartan (COZAAR) 50 MG tablet  ondansetron (ZOFRAN ODT) 4 MG ODT tab  prochlorperazine (COMPAZINE) 5 MG tablet  senna-docusate (SENOKOT-S/PERICOLACE) 8.6-50 MG tablet  venlafaxine (EFFEXOR XR) 150 MG 24 hr capsule        ALLERGIES:  No Known Allergies    FAMILY HISTORY:  No family history on file.    SOCIAL HISTORY:   Social History     Socioeconomic History    Marital status: Single   Tobacco Use    Smoking status: Some Days     Current packs/day: 0.20     Types: Cigarettes    Smokeless tobacco: Never   Substance and Sexual Activity    Alcohol use: Yes     Comment: Occasional    Drug use: Yes     Types: Marijuana     Social Drivers of Health     Financial Resource Strain: Low Risk  (11/3/2024)    Financial Resource Strain     Within the  past 12 months, have you or your family members you live with been unable to get utilities (heat, electricity) when it was really needed?: No   Food Insecurity: Low Risk  (11/3/2024)    Food Insecurity     Within the past 12 months, did you worry that your food would run out before you got money to buy more?: No     Within the past 12 months, did the food you bought just not last and you didn t have money to get more?: No   Transportation Needs: Low Risk  (11/3/2024)    Transportation Needs     Within the past 12 months, has lack of transportation kept you from medical appointments, getting your medicines, non-medical meetings or appointments, work, or from getting things that you need?: No   Physical Activity: Insufficiently Active (5/1/2024)    Received from [facility]    Exercise Vital Sign     Days of Exercise per Week: 1 day     Minutes of Exercise per Session: 30 min   Interpersonal Safety: Low Risk  (11/3/2024)    Interpersonal Safety     Do you feel physically and emotionally safe where you currently live?: Yes     Within the past 12 months, have you been hit, slapped, kicked or otherwise physically hurt by someone?: No     Within the past 12 months, have you been humiliated or emotionally abused in other ways by your partner or ex-partner?: No   Housing Stability: Low Risk  (11/3/2024)    Housing Stability     Do you have housing? : Yes     Are you worried about losing your housing?: No       VITALS:  /78   Pulse 72   Temp 97.7  F (36.5  C) (Oral)   Resp 18   Wt 84.8 kg (187 lb)   LMP 01/15/2025   SpO2 97%   BMI 32.10 kg/m      PHYSICAL EXAM      Vitals: /78   Pulse 72   Temp 97.7  F (36.5  C) (Oral)   Resp 18   Wt 84.8 kg (187 lb)   LMP 01/15/2025   SpO2 97%   BMI 32.10 kg/m    General: Appears in no acute distress, awake, alert, interactive.  Eyes: Conjunctivae non-injected. Sclera anicteric.  HENT: Atraumatic.  Neck: Supple.  Respiratory/Chest: Respiration unlabored.  Abdomen:  non distended  Rectal: Female chaperone present.  Thrombosed external hemorrhoid at 7 o'clock position.  Musculoskeletal: Normal extremities. No edema or erythema.  Skin: Normal color. No rash or diaphoresis.  Neurologic: Face symmetric, moves all extremities spontaneously. Speech clear.  Psychiatric: Oriented to person, place, and time. Affect appropriate.    LAB:  All pertinent labs reviewed and interpreted.  Results for orders placed or performed during the hospital encounter of 02/04/25   CT Abdomen Pelvis w Contrast    Impression    IMPRESSION:   1.  No acute abnormality in the abdomen and pelvis.  2.  Subtotal colectomy. No bowel obstruction or inflammation.  3.  Postsurgical changes in the anterior abdominal wall with a tiny amount of remaining fluid overlying the left rectus muscle inferiorly.  4.  Possible tiny remaining portal vein thrombus within the anterior branch of the right portal vein. Otherwise thrombus was demonstrated on 11/2/2024 is no present.     Basic metabolic panel   Result Value Ref Range    Sodium 140 135 - 145 mmol/L    Potassium 4.5 3.4 - 5.3 mmol/L    Chloride 105 98 - 107 mmol/L    Carbon Dioxide (CO2) 27 22 - 29 mmol/L    Anion Gap 8 7 - 15 mmol/L    Urea Nitrogen 7.5 6.0 - 20.0 mg/dL    Creatinine 0.72 0.51 - 0.95 mg/dL    GFR Estimate >90 >60 mL/min/1.73m2    Calcium 9.2 8.8 - 10.4 mg/dL    Glucose 95 70 - 99 mg/dL   CBC with platelets and differential   Result Value Ref Range    WBC Count 7.7 4.0 - 11.0 10e3/uL    RBC Count 4.71 3.80 - 5.20 10e6/uL    Hemoglobin 13.7 11.7 - 15.7 g/dL    Hematocrit 39.3 35.0 - 47.0 %    MCV 83 78 - 100 fL    MCH 29.1 26.5 - 33.0 pg    MCHC 34.9 31.5 - 36.5 g/dL    RDW 12.5 10.0 - 15.0 %    Platelet Count 218 150 - 450 10e3/uL    % Neutrophils 78 %    % Lymphocytes 13 %    % Monocytes 8 %    % Eosinophils 0 %    % Basophils 0 %    % Immature Granulocytes 1 %    NRBCs per 100 WBC 0 <1 /100    Absolute Neutrophils 6.0 1.6 - 8.3 10e3/uL    Absolute  Lymphocytes 1.0 0.8 - 5.3 10e3/uL    Absolute Monocytes 0.7 0.0 - 1.3 10e3/uL    Absolute Eosinophils 0.0 0.0 - 0.7 10e3/uL    Absolute Basophils 0.0 0.0 - 0.2 10e3/uL    Absolute Immature Granulocytes 0.0 <=0.4 10e3/uL    Absolute NRBCs 0.0 10e3/uL   RBC and Platelet Morphology   Result Value Ref Range    RBC Morphology Confirmed RBC Indices     Platelet Assessment  Automated Count Confirmed. Platelet morphology is normal.     Automated Count Confirmed. Platelet morphology is normal.       RADIOLOGY:  Reviewed all pertinent imaging. Please see official radiology report.  CT Abdomen Pelvis w Contrast   Final Result   IMPRESSION:    1.  No acute abnormality in the abdomen and pelvis.   2.  Subtotal colectomy. No bowel obstruction or inflammation.   3.  Postsurgical changes in the anterior abdominal wall with a tiny amount of remaining fluid overlying the left rectus muscle inferiorly.   4.  Possible tiny remaining portal vein thrombus within the anterior branch of the right portal vein. Otherwise thrombus was demonstrated on 11/2/2024 is no present.             PROCEDURES:   None        I, Taz Mackenzie, am serving as a scribe to document services personally performed by Milo Foreman MD based on my observation and the provider's statements to me. I, Milo Foreman MD, attest that Taz Mackenzie is acting in a scribe capacity, has observed my performance of the services and has documented them in accordance with my direction.    Milo Foreman MD  [facility] EMERGENCY DEPARTMENT  [address] [place] [id number]  [phone]      Milo Foreman MD  02/04/25 2806

## 2025-02-04 NOTE — ED TRIAGE NOTES
"Patient arrives by private car for evaluation of rectal pain since yesterday.  Reports she has diarrhea due to having her colon removed in October, she developed pain and \"bulge\" by her rectum yesterday.         "

## 2025-02-04 NOTE — DISCHARGE INSTRUCTIONS
Placed referral to colorectal surgery.  They should contact you in the next 1 to 2 days to arrange follow-up.  In the meantime recommend sitz bath's.  Use topical lidocaine available over-the-counter.  Use Norco every 6 hours for severe pain but be aware it contains Tylenol.  Return to the ER for worsening symptoms such as development of fever

## 2025-02-05 ENCOUNTER — HOSPITAL ENCOUNTER (OUTPATIENT)
Facility: AMBULATORY SURGERY CENTER | Age: 45
End: 2025-02-05
Attending: COLON & RECTAL SURGERY
Payer: COMMERCIAL

## 2025-02-05 ENCOUNTER — PREP FOR PROCEDURE (OUTPATIENT)
Dept: SURGERY | Facility: CLINIC | Age: 45
End: 2025-02-05

## 2025-02-05 ENCOUNTER — TELEPHONE (OUTPATIENT)
Dept: SURGERY | Facility: CLINIC | Age: 45
End: 2025-02-05

## 2025-02-05 ENCOUNTER — OFFICE VISIT (OUTPATIENT)
Dept: SURGERY | Facility: CLINIC | Age: 45
End: 2025-02-05
Payer: COMMERCIAL

## 2025-02-05 VITALS
HEIGHT: 64 IN | BODY MASS INDEX: 32.03 KG/M2 | DIASTOLIC BLOOD PRESSURE: 80 MMHG | OXYGEN SATURATION: 98 % | WEIGHT: 187.6 LBS | SYSTOLIC BLOOD PRESSURE: 117 MMHG | HEART RATE: 112 BPM

## 2025-02-05 DIAGNOSIS — K60.30 ANAL FISTULA: Primary | ICD-10-CM

## 2025-02-05 PROCEDURE — 46600 DIAGNOSTIC ANOSCOPY SPX: CPT | Performed by: NURSE PRACTITIONER

## 2025-02-05 PROCEDURE — 99203 OFFICE O/P NEW LOW 30 MIN: CPT | Mod: 25 | Performed by: NURSE PRACTITIONER

## 2025-02-05 RX ORDER — COLESTIPOL HYDROCHLORIDE 5 G/5G
GRANULE, FOR SUSPENSION ORAL
COMMUNITY

## 2025-02-05 NOTE — TELEPHONE ENCOUNTER
Called and spoke to patient to schedule surgery with colo-rectal surgeon. Per RN - schedule with Dr. Flores.     Offered first available date of 02/26/2025 at [facility].   Patient requested [facility] - Offered 02/28/2025.     Patient requested to schedule for the first week of March if possible. Informed patient that Dr. Flores does not have anything until the end of March.     Patient inquired if the surgery could be done with another surgeon - RN to advise.  Writer will call back once discussed with RN.     Patient expressed understanding.       Navya Lin on 2/5/2025 at 2:30 PM

## 2025-02-05 NOTE — TELEPHONE ENCOUNTER
[facility] Call Center    Phone Message    May a detailed message be left on voicemail: yes     Reason for Call: Other: Patient called to schedule from referral, DX: Thrombosed external hemorrhoids, as she was at [facility] yesterday. Patient states severe anal pain in last 3 days.  Please follow up with patient.     Action Taken: Message routed to:  [facility]    Travel Screening: Not Applicable

## 2025-02-05 NOTE — TELEPHONE ENCOUNTER
Called patient to schedule surgery with Dr. Antonio    Spoke with: Patient     Date of Surgery: 03/04/2025    Estimated Arrival time Discussed with Patient:  No    Location of surgery: [facility]     Pre-Op H&P: Instructed to schedule w/ PEDRO - Tiffani Cassidy MD within 10-30 days of surgery     Post-Op Appt Date w/ NP/PA:  HERNÁN Saunders, CNP 03/19 at 10:15AM    Bowel Prep:  Yes  2 Fleets Sent via "[employer] Message    Discussed with patient pre-op RN will call 2-3 days prior to surgery with arrival time and instructions:  Yes       Standard Surgery Packet Sent: Yes 02/05/25  via "[employer] Message      Additional Comments: Offered additional surgery date of 03/03 with Dr. Daley.     Patient is aware to have a  for surgery.       Confirmed with RN okay to schedule with any surgeon.       All patients questions were answered and was instructed to review surgical packet and call back with any questions or concerns.       Navya Lin on 2/5/2025 at 2:54 PM

## 2025-02-05 NOTE — CONFIDENTIAL NOTE
Thrombosed hemorrhoid since Monday. Pain is 10/10. Went to ED yesterday and they provided pain medications but no excision of clot. Discussed this vs conservative management. Anisa would like to be seen today in clinic for possible clot excision. Scheduled with Francesca Piedra NP and let her know she would be an add on patient today.

## 2025-02-05 NOTE — LETTER
"2025       RE: Anisa Love  [address] [facility] [address]     Dear Colleague,    Thank you for referring your patient, Anisa Love, to the [facility] COLON AND RECTAL SURGERY CLINIC [place] at [facility]. Please see a copy of my visit note below.    Colon and Rectal Surgery Consult Clinic Note    Date: 2025     Referring provider:  Referred Ji, MD  No address on file     RE: Anisa Love  : 1980  ALEX: 2025    Anisa Love is a very pleasant 44 year old female here for thrombosed external hemorrhoid.    HPI:  Subtotal colectomy 10/22/24 by Dr. Lorenzo at [facility] for constipation and redundant colon. Portal vein thrombosis postoperatively and she is on Eliquis. Has had chronic diarrhea that is interfering with her ability to work. She has reportedly had complete workup at [place] without resolution of symptoms and she is hoping to switch her care here.     Physical Examination:  /80 (BP Location: Left arm, Patient Position: Standing, Cuff Size: Adult Regular)   Pulse 112   Ht 5' 4\"   Wt 187 lb 9.6 oz   LMP 01/15/2025   SpO2 98%   BMI 32.20 kg/m    General: alert, oriented, in no acute distress, sitting comfortably  HEENT: mucous membranes moist    Perianal external examination: Exam was chaperoned by Natali Joel, EMT   Resolving perianal abscess in the posterior perianal position with a small amount of purulent drainage from opened area. This can be probed with fistula probe into the distal anal canal.    Digital rectal examination: Was performed.   Sphincter tone: Good.  Palpable lesions: Small ulceration in the posterior distal anal canal.  Other: None.  Bimanual examination: was not performed    Anoscopy: Was performed.   Small internal fistula opening in the posterior position    Assessment/Plan: 44 year old female with anal fistula. We had a long discussion regarding treatment including possible fistulotomy and possible " seton placement depending on muscle involvement. Has had chronic diarrhea since her subtotal colectomy.Due to her chronic diarrhea, discussed likely seton placement and the possibility that this could be long term. Discussed that if a seton is placed that they could need further surgical intervention and if no tract is able to be found at the time of surgery that we may need to get further workup and imaging. Discussed the risks of developing an abscess and asked them to return to clinic if they develop any worsening symptoms. They stated an understanding of this and wishes to proceed with surgical intervention.Recommended increasing her fiber powder to 3 times a day and starting Imodium daily.  She can slowly titrate this up to try to thicken up her stools.  She would like to transfer her care here and she can discuss this further with one of the surgeons if her symptoms persist.    Medical history:  Past Medical History:   Diagnosis Date     Depression      GERD (gastroesophageal reflux disease)      Gonorrhea      HPV (human papilloma virus) anogenital infection      Hypertension      Mental disorder      Motion sickness      Noninfectious ileitis      Other chronic pain 2023     PONV (postoperative nausea and vomiting)      Sleep apnea     Slight does not use a CPAP     TMJ (temporomandibular joint syndrome)     Pt is able to put 3 fingers stacked in mouth     Ulcerative colitis (H)      Venous thromboembolism 2024       Surgical history:  Past Surgical History:   Procedure Laterality Date      SECTION N/A 2019    Procedure:  SECTION;  Surgeon: Magi Ayala MD;  Location: [facility] OR;  Service: Obstetrics     HYSTEROSCOPY, WITH ENDOMETRIAL RADIOFREQUENCY ABLATION - NOVASURE  2023    Procedure: HYSTEROSCOPY with DILATION AND CURETTAGE, DIAGNOSTIC LAPAROSCOPY, BILATERAL LAPAROSCOPIC SALPINGECTOMY;  Surgeon: Anitha Archuleta DO;  Location: [facility]      HYSTEROSCOPY, WITH ENDOMETRIAL RADIOFREQUENCY ABLATION - NOVASURE N/A 3/8/2024    Procedure: HYSTEROSCOPY, WITH ENDOMETRIAL ABLATION;  Surgeon: Anitha Archuleta DO;  Location: [facility] OR     LAPAROSCOPIC CHOLECYSTECTOMY  2019     LAPAROSCOPY DIAGNOSTIC (GYN) N/A 8/3/2022    Procedure: DIAGNOSTIC LAPAROSCOPY, excision of endometrial cysts and cauterizaton of endometrial cysts;  Surgeon: Minerva York MD;  Location: [facility] OR     WISDOM TOOTH EXTRACTION         Problem list:    Patient Active Problem List    Diagnosis Date Noted     Acute thrombosis of splenic vein 2024     Priority: Medium     Thrombophlebitis of arm, left 2024     Priority: Medium     Venous thromboembolism 2024     Priority: Medium     Portal vein thrombosis 2024     Priority: Medium     History of eating disorder 2024     Priority: Medium     Irritable bowel syndrome 2024     Priority: Medium     Ulcerative colitis (H) 2024     Priority: Medium     Mixed anxiety and depressive disorder 2024     Priority: Medium     Hypertensive disorder 2024     Priority: Medium     Irregular menstruation, unspecified 2023     Priority: Medium     Other chronic pain 2023     Priority: Medium     Endometriosis 2023     Priority: Medium     TMJ (temporomandibular joint disorder) 2023     Priority: Medium     Atypical squamous cells of undetermined significance (ASCUS) on Papanicolaou smear of cervix 2021     Priority: Medium     Benign essential hypertension 2021     Priority: Medium     Generalized anxiety disorder 2021     Priority: Medium     Headache disorder 2021     Priority: Medium     Depression 2021     Priority: Medium      delivery delivered 2019     Priority: Medium     Preeclampsia 2019     Priority: Medium     Hypertension complicating pregnancy, childbirth and puerperium with baby delivered and   complication 2019     Priority: Medium     HTN (hypertension) 2019     Priority: Medium     Hx of  delivery, currently pregnant 2019     Priority: Medium     Hypertension affecting pregnancy in second trimester 2019     Priority: Medium     Threatened  labor, second trimester 2019     Priority: Medium      (spontaneous vaginal delivery) 2017     Priority: Medium      delivery 2017     Priority: Medium     Premature cervical dilation in second trimester 2017     Priority: Medium     Premature rupture of membranes in second trimester 12/10/2017     Priority: Medium     Dichorionic diamniotic twin pregnancy in second trimester 2017     Priority: Medium     Vaginal discharge during pregnancy, antepartum, second trimester 2017     Priority: Medium     Formatting of this note might be different from the original. AmniSure =>  Negative         Medications:  Current Outpatient Medications   Medication Sig Dispense Refill     acyclovir (ZOVIRAX) 400 MG tablet Take 400 mg by mouth 3 times daily as needed       ALPRAZolam (XANAX) 0.5 MG tablet Take 0.5 mg by mouth nightly as needed for anxiety.       amLODIPine (NORVASC) 10 MG tablet Take 10 mg by mouth daily.       apixaban ANTICOAGULANT (ELIQUIS) 5 MG tablet Take 2 tablets (10 mg) by mouth 2 times daily for 7 days, THEN 1 tablet (5 mg) 2 times daily. 208 tablet 0     eszopiclone (LUNESTA) 3 MG tablet Take 3 mg by mouth at bedtime.       HYDROcodone-acetaminophen (NORCO) 5-325 MG tablet Take 1 tablet by mouth every 6 hours as needed for severe pain. 10 tablet 0     losartan (COZAAR) 50 MG tablet Take 50 mg by mouth daily       ondansetron (ZOFRAN ODT) 4 MG ODT tab Take 1 tablet (4 mg) by mouth every 8 hours as needed for nausea 4 tablet 0     venlafaxine (EFFEXOR XR) 150 MG 24 hr capsule Take 150 mg by mouth daily.       acetaminophen (TYLENOL) 325 MG tablet Take 2 tablets (650 mg)  "by mouth every 4 hours as needed for mild pain. (Patient not taking: Reported on 2/5/2025)       colestipol (COLESTID) 5 g packet        HYDROmorphone (DILAUDID) 2 MG tablet Take 1 tablet (2 mg) by mouth every 6 hours as needed for severe pain. (Patient not taking: Reported on 2/5/2025) 12 tablet 0     prochlorperazine (COMPAZINE) 5 MG tablet Take 5 mg by mouth every 6 hours as needed for nausea or vomiting. (Patient not taking: Reported on 2/5/2025)       senna-docusate (SENOKOT-S/PERICOLACE) 8.6-50 MG tablet Take 1 tablet by mouth 2 times daily as needed for constipation. (Patient not taking: Reported on 2/5/2025)         Allergies:  No Known Allergies    Family history:  No family history on file.    Social history:  Social History     Tobacco Use     Smoking status: Former     Current packs/day: 0.20     Types: Cigarettes     Smokeless tobacco: Never     Tobacco comments:     Quit in september   Substance Use Topics     Alcohol use: Yes     Comment: Occasional    Marital status: single.    Nursing Notes:   Natali Joel  2/5/2025 12:31 PM  Signed  Chief Complaint   Patient presents with     Hemorrhoids     Thrombosed hemorrhoid       Vitals:    02/05/25 1226   BP: 117/80   BP Location: Left arm   Patient Position: Standing   Cuff Size: Adult Regular   Pulse: 112   SpO2: 98%   Weight: 187 lb 9.6 oz   Height: 5' 4\"       Body mass index is 32.2 kg/m .    SERGO Prescott     30 minutes spent on the date of encounter performing chart review, history and exam, documentation and further activities as noted above with an additional 2 minutes for anoscopy.     HERNÁN Horton, NP-C  Colon and Rectal Surgery   [facility]    This note was created using speech recognition software and may contain unintended word substitutions.      Again, thank you for allowing me to participate in the care of your patient.      Sincerely,    HERNÁN Horton CNP    "

## 2025-02-05 NOTE — NURSING NOTE
"Chief Complaint   Patient presents with    Hemorrhoids     Thrombosed hemorrhoid       Vitals:    02/05/25 1226   BP: 117/80   BP Location: Left arm   Patient Position: Standing   Cuff Size: Adult Regular   Pulse: 112   SpO2: 98%   Weight: 187 lb 9.6 oz   Height: 5' 4\"       Body mass index is 32.2 kg/m .    Natali Joel EMT  "

## 2025-02-05 NOTE — PROGRESS NOTES
"Colon and Rectal Surgery Consult Clinic Note    Date: 2025     Referring provider:  Referred Self, MD  No address on file     RE: Anisa Love  : 1980  ALEX: 2025    Anisa Love is a very pleasant 44 year old female here for thrombosed external hemorrhoid.    HPI:  Subtotal colectomy 10/22/24 by Dr. Lorenzo at [facility] for constipation and redundant colon. Portal vein thrombosis postoperatively and she is on Eliquis. Has had chronic diarrhea that is interfering with her ability to work. She has reportedly had complete workup at [place] without resolution of symptoms and she is hoping to switch her care here.     Physical Examination:  /80 (BP Location: Left arm, Patient Position: Standing, Cuff Size: Adult Regular)   Pulse 112   Ht 5' 4\"   Wt 187 lb 9.6 oz   LMP 01/15/2025   SpO2 98%   BMI 32.20 kg/m    General: alert, oriented, in no acute distress, sitting comfortably  HEENT: mucous membranes moist    Perianal external examination: Exam was chaperoned by SERGO Prescott   Resolving perianal abscess in the posterior perianal position with a small amount of purulent drainage from opened area. This can be probed with fistula probe into the distal anal canal.    Digital rectal examination: Was performed.   Sphincter tone: Good.  Palpable lesions: Small ulceration in the posterior distal anal canal.  Other: None.  Bimanual examination: was not performed    Anoscopy: Was performed.   Small internal fistula opening in the posterior position    Assessment/Plan: 44 year old female with anal fistula. We had a long discussion regarding treatment including possible fistulotomy and possible seton placement depending on muscle involvement. Has had chronic diarrhea since her subtotal colectomy.Due to her chronic diarrhea, discussed likely seton placement and the possibility that this could be long term. Discussed that if a seton is placed that they could need further surgical intervention and if no tract is " able to be found at the time of surgery that we may need to get further workup and imaging. Discussed the risks of developing an abscess and asked them to return to clinic if they develop any worsening symptoms. They stated an understanding of this and wishes to proceed with surgical intervention.Recommended increasing her fiber powder to 3 times a day and starting Imodium daily.  She can slowly titrate this up to try to thicken up her stools.  She would like to transfer her care here and she can discuss this further with one of the surgeons if her symptoms persist.    Medical history:  Past Medical History:   Diagnosis Date    Depression     GERD (gastroesophageal reflux disease)     Gonorrhea     HPV (human papilloma virus) anogenital infection     Hypertension     Mental disorder     Motion sickness     Noninfectious ileitis     Other chronic pain 2023    PONV (postoperative nausea and vomiting)     Sleep apnea     Slight does not use a CPAP    TMJ (temporomandibular joint syndrome)     Pt is able to put 3 fingers stacked in mouth    Ulcerative colitis (H)     Venous thromboembolism 2024       Surgical history:  Past Surgical History:   Procedure Laterality Date     SECTION N/A 2019    Procedure:  SECTION;  Surgeon: Magi Ayala MD;  Location: [facility] OR;  Service: Obstetrics    HYSTEROSCOPY, WITH ENDOMETRIAL RADIOFREQUENCY ABLATION - NOVASURE  2023    Procedure: HYSTEROSCOPY with DILATION AND CURETTAGE, DIAGNOSTIC LAPAROSCOPY, BILATERAL LAPAROSCOPIC SALPINGECTOMY;  Surgeon: Anitha Archuleta DO;  Location: [facility] OR    HYSTEROSCOPY, WITH ENDOMETRIAL RADIOFREQUENCY ABLATION - NOVASURE N/A 3/8/2024    Procedure: HYSTEROSCOPY, WITH ENDOMETRIAL ABLATION;  Surgeon: Anitha Archuleta DO;  Location: [facility] OR    LAPAROSCOPIC CHOLECYSTECTOMY  2019    LAPAROSCOPY DIAGNOSTIC (GYN) N/A 8/3/2022    Procedure: DIAGNOSTIC LAPAROSCOPY, excision of  endometrial cysts and cauterizaton of endometrial cysts;  Surgeon: Minerva York MD;  Location: [facility] OR    WISDOM TOOTH EXTRACTION         Problem list:    Patient Active Problem List    Diagnosis Date Noted    Acute thrombosis of splenic vein 2024     Priority: Medium    Thrombophlebitis of arm, left 2024     Priority: Medium    Venous thromboembolism 2024     Priority: Medium    Portal vein thrombosis 2024     Priority: Medium    History of eating disorder 2024     Priority: Medium    Irritable bowel syndrome 2024     Priority: Medium    Ulcerative colitis (H) 2024     Priority: Medium    Mixed anxiety and depressive disorder 2024     Priority: Medium    Hypertensive disorder 2024     Priority: Medium    Irregular menstruation, unspecified 2023     Priority: Medium    Other chronic pain 2023     Priority: Medium    Endometriosis 2023     Priority: Medium    TMJ (temporomandibular joint disorder) 2023     Priority: Medium    Atypical squamous cells of undetermined significance (ASCUS) on Papanicolaou smear of cervix 2021     Priority: Medium    Benign essential hypertension 2021     Priority: Medium    Generalized anxiety disorder 2021     Priority: Medium    Headache disorder 2021     Priority: Medium    Depression 2021     Priority: Medium     delivery delivered 2019     Priority: Medium    Preeclampsia 2019     Priority: Medium    Hypertension complicating pregnancy, childbirth and puerperium with baby delivered and  complication 2019     Priority: Medium    HTN (hypertension) 2019     Priority: Medium    Hx of  delivery, currently pregnant 2019     Priority: Medium    Hypertension affecting pregnancy in second trimester 2019     Priority: Medium    Threatened  labor, second trimester 2019     Priority: Medium      (spontaneous vaginal delivery) 2017     Priority: Medium     delivery 2017     Priority: Medium    Premature cervical dilation in second trimester 2017     Priority: Medium    Premature rupture of membranes in second trimester 12/10/2017     Priority: Medium    Dichorionic diamniotic twin pregnancy in second trimester 2017     Priority: Medium    Vaginal discharge during pregnancy, antepartum, second trimester 2017     Priority: Medium     Formatting of this note might be different from the original. AmniSure =>  Negative         Medications:  Current Outpatient Medications   Medication Sig Dispense Refill    acyclovir (ZOVIRAX) 400 MG tablet Take 400 mg by mouth 3 times daily as needed      ALPRAZolam (XANAX) 0.5 MG tablet Take 0.5 mg by mouth nightly as needed for anxiety.      amLODIPine (NORVASC) 10 MG tablet Take 10 mg by mouth daily.      apixaban ANTICOAGULANT (ELIQUIS) 5 MG tablet Take 2 tablets (10 mg) by mouth 2 times daily for 7 days, THEN 1 tablet (5 mg) 2 times daily. 208 tablet 0    eszopiclone (LUNESTA) 3 MG tablet Take 3 mg by mouth at bedtime.      HYDROcodone-acetaminophen (NORCO) 5-325 MG tablet Take 1 tablet by mouth every 6 hours as needed for severe pain. 10 tablet 0    losartan (COZAAR) 50 MG tablet Take 50 mg by mouth daily      ondansetron (ZOFRAN ODT) 4 MG ODT tab Take 1 tablet (4 mg) by mouth every 8 hours as needed for nausea 4 tablet 0    venlafaxine (EFFEXOR XR) 150 MG 24 hr capsule Take 150 mg by mouth daily.      acetaminophen (TYLENOL) 325 MG tablet Take 2 tablets (650 mg) by mouth every 4 hours as needed for mild pain. (Patient not taking: Reported on 2025)      colestipol (COLESTID) 5 g packet       HYDROmorphone (DILAUDID) 2 MG tablet Take 1 tablet (2 mg) by mouth every 6 hours as needed for severe pain. (Patient not taking: Reported on 2025) 12 tablet 0    prochlorperazine (COMPAZINE) 5 MG tablet Take 5 mg by mouth every 6 hours as  "needed for nausea or vomiting. (Patient not taking: Reported on 2/5/2025)      senna-docusate (SENOKOT-S/PERICOLACE) 8.6-50 MG tablet Take 1 tablet by mouth 2 times daily as needed for constipation. (Patient not taking: Reported on 2/5/2025)         Allergies:  No Known Allergies    Family history:  No family history on file.    Social history:  Social History     Tobacco Use    Smoking status: Former     Current packs/day: 0.20     Types: Cigarettes    Smokeless tobacco: Never    Tobacco comments:     Quit in september   Substance Use Topics    Alcohol use: Yes     Comment: Occasional    Marital status: single.    Nursing Notes:   Natali Joel  2/5/2025 12:31 PM  Signed  Chief Complaint   Patient presents with    Hemorrhoids     Thrombosed hemorrhoid       Vitals:    02/05/25 1226   BP: 117/80   BP Location: Left arm   Patient Position: Standing   Cuff Size: Adult Regular   Pulse: 112   SpO2: 98%   Weight: 187 lb 9.6 oz   Height: 5' 4\"       Body mass index is 32.2 kg/m .    SERGO Prescott     30 minutes spent on the date of encounter performing chart review, history and exam, documentation and further activities as noted above with an additional 2 minutes for anoscopy.     HERNÁN Karimi, NP-C  Colon and Rectal Surgery   [facility]    This note was created using speech recognition software and may contain unintended word substitutions.    "

## 2025-02-10 ENCOUNTER — NURSE TRIAGE (OUTPATIENT)
Dept: NURSING | Facility: CLINIC | Age: 45
End: 2025-02-10
Payer: COMMERCIAL

## 2025-02-10 ENCOUNTER — TELEPHONE (OUTPATIENT)
Dept: SURGERY | Facility: CLINIC | Age: 45
End: 2025-02-10
Payer: COMMERCIAL

## 2025-02-10 NOTE — TELEPHONE ENCOUNTER
Called patient to move surgery up from 03/04 with Marisela. Scheduled with Mark.     Spoke with: Patient     Date of Surgery: 02/14/2025 w/ Mark    Estimated Arrival time Discussed with Patient:  No    Location of surgery: [facility]     Pre-Op H&P: Instructed to schedule w/ PCP - Tiffani Cassidy MD within 10-30 days of surgery     Post-Op Appt Date w/ NP/PA:  HERNÁN Saunders, CNP 03/05 at 3PM      Bowel Prep:  Yes  2 Fleets already sent via [employer]    Discussed with patient pre-op RN will call 2-3 days prior to surgery with arrival time and instructions:  Yes       Standard Surgery Packet Sent:  Already sent  via [employer] Message      Additional Comments: Patient has questions regarding the procedure - Routed to RN.     Instructed patient to call if unable to get in for pre-op w/ PCP and we will schedule with PAC.       All patients questions were answered and was instructed to review surgical packet and call back with any questions or concerns.       Navya Lin on 2/10/2025 at 9:19 AM

## 2025-02-10 NOTE — TELEPHONE ENCOUNTER
"Nurse Triage SBAR    Is this a 2nd Level Triage? YES, LICENSED PRACTITIONER REVIEW IS REQUIRED    Situation: Patient calling as her diagnosed Rectal fistula is causing her lots of pain, \"closing up\" again, also nausea and fatigue    Background: Seen in ED 2/4/25, Diagnosed with Thrombosed external hemorrhoids, and portal vein thrombosis, Seen in Colon and Rectal surgery clinic 2/5/25, Diagnosed Rectal Fistula and Plan for surgery on March 4th, initially open and draining, doing sitz baths.     Assessment: Today Rectal fistula area is closing up again, increased pain to \"5\" and building, afebrile, No abdominal pain, Nausea and fatigue present, no vomiting, Has had mild runny nose and cough x 1 week. Drinking well/voiding, no urinary symptoms. No problems with passing stools.     Protocol Recommended Disposition:   See in Office Today    Recommendation: Please call patient back regarding work in appointment/advice     Routed to provider    Does the patient meet one of the following criteria for ADS visit consideration? No      Reason for Disposition   MODERATE-SEVERE rectal pain (i.e., interferes with school, work, or sleep)    Additional Information   Negative: Sounds like a life-threatening emergency to the triager   Negative: Diarrhea is main symptom   Negative: Constipation is main symptom (e.g., pain or discomfort caused by passage of hard BMs)   Negative: Blood in or on bowel movement is main symptom   Negative: MPOX SUSPECTED (e.g., direct skin contact such as sex, recent travel to [place] or [place]) and any SYMPTOMS OF MPOX (e.g., rash, fever, muscle aches, or swollen lymph nodes)   Negative: AT RISK FOR MPOX (men-who-have-sex-with-men) and POSSIBLE EXPOSURE (e.g., multiple sex partners in past 21 days) and ANY SYMPTOMS OF MPOX (e.g., rash, fever, muscle aches, or swollen lymph nodes)   Negative: Sexual assault or rape (sexual intercourse or activity occurs without freely given consent), known or " suspected   Negative: Injury to rectum   Negative: Patient sounds very sick or weak to the triager   Negative: Severe rectal pain   Negative: Rectal pain or redness and fever > 100.4 F (38.0 C)   Negative: Acute onset rectal pain and constipation (straining with rectal pressure or fullness), which is not relieved by Sitz bath or suppository    Protocols used: Rectal Symptoms-A-[place]  Adela CANADA RN  [facility]/Red Flag Triage & Med Refill Team

## 2025-02-10 NOTE — TELEPHONE ENCOUNTER
[facility] Call Center    Phone Message    May a detailed message be left on voicemail: yes     Reason for Call: Other: Pt would like to be seen sooner than currently scheduled for surgery on 3/4, pt reports symptoms pain, feels sick and unwell. Pt reports trouble walking. Pt offered to speak to centralized nurse, pt accepted.      Action Taken: Other: clr     Travel Screening: Not Applicable     Date of Service:

## 2025-02-11 ENCOUNTER — TELEPHONE (OUTPATIENT)
Dept: SURGERY | Facility: CLINIC | Age: 45
End: 2025-02-11
Payer: COMMERCIAL

## 2025-02-11 NOTE — TELEPHONE ENCOUNTER
Called and spoke to patient regarding request to cancel surgery with Dr. Flores on 02/14/2025 due to being out of network with her insurance.     Patient confirmed surgery cancellation.     Cancelled - Post-Op Appt Date w/ NP/PA:  HERNÁN Saunders, CNP 03/05 at 3PM         Navya Lin on 2/11/2025 at 9:51 AM

## 2025-02-17 ENCOUNTER — TELEPHONE (OUTPATIENT)
Dept: HEMATOLOGY | Facility: CLINIC | Age: 45
End: 2025-02-17
Payer: COMMERCIAL

## 2025-03-11 ENCOUNTER — LAB REQUISITION (OUTPATIENT)
Dept: LAB | Facility: CLINIC | Age: 45
End: 2025-03-11

## 2025-03-11 DIAGNOSIS — Z01.818 ENCOUNTER FOR OTHER PREPROCEDURAL EXAMINATION: ICD-10-CM

## 2025-03-11 PROCEDURE — 80053 COMPREHEN METABOLIC PANEL: CPT | Performed by: STUDENT IN AN ORGANIZED HEALTH CARE EDUCATION/TRAINING PROGRAM

## 2025-03-12 LAB
ALBUMIN SERPL BCG-MCNC: 4.5 G/DL (ref 3.5–5.2)
ALP SERPL-CCNC: 49 U/L (ref 40–150)
ALT SERPL W P-5'-P-CCNC: 31 U/L (ref 0–50)
ANION GAP SERPL CALCULATED.3IONS-SCNC: 10 MMOL/L (ref 7–15)
AST SERPL W P-5'-P-CCNC: 28 U/L (ref 0–45)
BILIRUB SERPL-MCNC: 1.5 MG/DL
BUN SERPL-MCNC: 10.9 MG/DL (ref 6–20)
CALCIUM SERPL-MCNC: 9.6 MG/DL (ref 8.8–10.4)
CHLORIDE SERPL-SCNC: 105 MMOL/L (ref 98–107)
CREAT SERPL-MCNC: 0.82 MG/DL (ref 0.51–0.95)
EGFRCR SERPLBLD CKD-EPI 2021: >90 ML/MIN/1.73M2
GLUCOSE SERPL-MCNC: 92 MG/DL (ref 70–99)
HCO3 SERPL-SCNC: 25 MMOL/L (ref 22–29)
POTASSIUM SERPL-SCNC: 4.2 MMOL/L (ref 3.4–5.3)
PROT SERPL-MCNC: 7.2 G/DL (ref 6.4–8.3)
SODIUM SERPL-SCNC: 140 MMOL/L (ref 135–145)

## 2025-03-24 ENCOUNTER — HOSPITAL ENCOUNTER (EMERGENCY)
Facility: HOSPITAL | Age: 45
Discharge: HOME | End: 2025-03-24
Attending: EMERGENCY MEDICINE | Admitting: EMERGENCY MEDICINE
Payer: COMMERCIAL

## 2025-03-24 ENCOUNTER — APPOINTMENT (OUTPATIENT)
Dept: CT IMAGING | Facility: HOSPITAL | Age: 45
End: 2025-03-24
Attending: EMERGENCY MEDICINE
Payer: COMMERCIAL

## 2025-03-24 VITALS
RESPIRATION RATE: 18 BRPM | BODY MASS INDEX: 32.1 KG/M2 | OXYGEN SATURATION: 94 % | SYSTOLIC BLOOD PRESSURE: 128 MMHG | HEART RATE: 88 BPM | DIASTOLIC BLOOD PRESSURE: 73 MMHG | TEMPERATURE: 98.7 F | WEIGHT: 187 LBS

## 2025-03-24 DIAGNOSIS — K52.9 ENTERITIS: ICD-10-CM

## 2025-03-24 LAB
ALBUMIN SERPL BCG-MCNC: 4.1 G/DL (ref 3.5–5.2)
ALBUMIN UR-MCNC: NEGATIVE MG/DL
ALP SERPL-CCNC: 51 U/L (ref 40–150)
ALT SERPL W P-5'-P-CCNC: 128 U/L (ref 0–50)
ANION GAP SERPL CALCULATED.3IONS-SCNC: 10 MMOL/L (ref 7–15)
APPEARANCE UR: CLEAR
AST SERPL W P-5'-P-CCNC: 46 U/L (ref 0–45)
BACTERIA #/AREA URNS HPF: ABNORMAL /HPF
BILIRUB DIRECT SERPL-MCNC: 0.21 MG/DL (ref 0–0.3)
BILIRUB SERPL-MCNC: 0.5 MG/DL
BILIRUB UR QL STRIP: NEGATIVE
BUN SERPL-MCNC: 12 MG/DL (ref 6–20)
CALCIUM SERPL-MCNC: 9.6 MG/DL (ref 8.8–10.4)
CHLORIDE SERPL-SCNC: 101 MMOL/L (ref 98–107)
COLOR UR AUTO: COLORLESS
CREAT SERPL-MCNC: 0.74 MG/DL (ref 0.51–0.95)
EGFRCR SERPLBLD CKD-EPI 2021: >90 ML/MIN/1.73M2
ERYTHROCYTE [DISTWIDTH] IN BLOOD BY AUTOMATED COUNT: 12.5 % (ref 10–15)
GLUCOSE SERPL-MCNC: 120 MG/DL (ref 70–99)
GLUCOSE UR STRIP-MCNC: NEGATIVE MG/DL
HCG SERPL QL: NEGATIVE
HCO3 SERPL-SCNC: 28 MMOL/L (ref 22–29)
HCT VFR BLD AUTO: 37.8 % (ref 35–47)
HGB BLD-MCNC: 13.7 G/DL (ref 11.7–15.7)
HGB UR QL STRIP: NEGATIVE
KETONES UR STRIP-MCNC: NEGATIVE MG/DL
LEUKOCYTE ESTERASE UR QL STRIP: NEGATIVE
LIPASE SERPL-CCNC: 23 U/L (ref 13–60)
MCH RBC QN AUTO: 30.6 PG (ref 26.5–33)
MCHC RBC AUTO-ENTMCNC: 36.2 G/DL (ref 31.5–36.5)
MCV RBC AUTO: 84 FL (ref 78–100)
NITRATE UR QL: NEGATIVE
PH UR STRIP: 5.5 [PH] (ref 5–7)
PLATELET # BLD AUTO: 295 10E3/UL (ref 150–450)
POTASSIUM SERPL-SCNC: 3.7 MMOL/L (ref 3.4–5.3)
PROT SERPL-MCNC: 6.5 G/DL (ref 6.4–8.3)
RBC # BLD AUTO: 4.48 10E6/UL (ref 3.8–5.2)
RBC URINE: 0 /HPF
SODIUM SERPL-SCNC: 139 MMOL/L (ref 135–145)
SP GR UR STRIP: 1 (ref 1–1.03)
SQUAMOUS EPITHELIAL: 1 /HPF
UROBILINOGEN UR STRIP-MCNC: NORMAL MG/DL
WBC # BLD AUTO: 6.6 10E3/UL (ref 4–11)
WBC URINE: <1 /HPF

## 2025-03-24 PROCEDURE — 84703 CHORIONIC GONADOTROPIN ASSAY: CPT | Performed by: EMERGENCY MEDICINE

## 2025-03-24 PROCEDURE — 96374 THER/PROPH/DIAG INJ IV PUSH: CPT | Mod: 59

## 2025-03-24 PROCEDURE — 36415 COLL VENOUS BLD VENIPUNCTURE: CPT | Performed by: EMERGENCY MEDICINE

## 2025-03-24 PROCEDURE — 96372 THER/PROPH/DIAG INJ SC/IM: CPT | Performed by: EMERGENCY MEDICINE

## 2025-03-24 PROCEDURE — 250N000011 HC RX IP 250 OP 636: Performed by: EMERGENCY MEDICINE

## 2025-03-24 PROCEDURE — 82248 BILIRUBIN DIRECT: CPT | Performed by: EMERGENCY MEDICINE

## 2025-03-24 PROCEDURE — 85027 COMPLETE CBC AUTOMATED: CPT | Performed by: EMERGENCY MEDICINE

## 2025-03-24 PROCEDURE — 99285 EMERGENCY DEPT VISIT HI MDM: CPT | Mod: 25

## 2025-03-24 PROCEDURE — 81001 URINALYSIS AUTO W/SCOPE: CPT | Performed by: EMERGENCY MEDICINE

## 2025-03-24 PROCEDURE — 83690 ASSAY OF LIPASE: CPT | Performed by: EMERGENCY MEDICINE

## 2025-03-24 PROCEDURE — 74177 CT ABD & PELVIS W/CONTRAST: CPT

## 2025-03-24 RX ORDER — IOPAMIDOL 755 MG/ML
90 INJECTION, SOLUTION INTRAVASCULAR ONCE
Status: COMPLETED | OUTPATIENT
Start: 2025-03-24 | End: 2025-03-24

## 2025-03-24 RX ORDER — DICYCLOMINE HYDROCHLORIDE 10 MG/ML
10 INJECTION INTRAMUSCULAR ONCE
Status: COMPLETED | OUTPATIENT
Start: 2025-03-24 | End: 2025-03-24

## 2025-03-24 RX ORDER — MORPHINE SULFATE 4 MG/ML
4 INJECTION, SOLUTION INTRAMUSCULAR; INTRAVENOUS ONCE
Status: COMPLETED | OUTPATIENT
Start: 2025-03-24 | End: 2025-03-24

## 2025-03-24 RX ADMIN — MORPHINE SULFATE 4 MG: 4 INJECTION, SOLUTION INTRAMUSCULAR; INTRAVENOUS at 19:22

## 2025-03-24 RX ADMIN — IOPAMIDOL 90 ML: 755 INJECTION, SOLUTION INTRAVENOUS at 19:47

## 2025-03-24 RX ADMIN — DICYCLOMINE HYDROCHLORIDE 10 MG: 10 INJECTION, SOLUTION INTRAMUSCULAR at 20:14

## 2025-03-24 ASSESSMENT — ACTIVITIES OF DAILY LIVING (ADL)
ADLS_ACUITY_SCORE: 41
ADLS_ACUITY_SCORE: 41

## 2025-03-24 ASSESSMENT — COLUMBIA-SUICIDE SEVERITY RATING SCALE - C-SSRS
1. IN THE PAST MONTH, HAVE YOU WISHED YOU WERE DEAD OR WISHED YOU COULD GO TO SLEEP AND NOT WAKE UP?: NO
2. HAVE YOU ACTUALLY HAD ANY THOUGHTS OF KILLING YOURSELF IN THE PAST MONTH?: NO
6. HAVE YOU EVER DONE ANYTHING, STARTED TO DO ANYTHING, OR PREPARED TO DO ANYTHING TO END YOUR LIFE?: NO

## 2025-03-24 NOTE — ED PROVIDER NOTES
EMERGENCY DEPARTMENT ENCOUnter      NAME: Anisa Love  AGE: 45 year old female  YOB: 1980  MRN: [id number]  EVALUATION DATE & TIME: 3/24/2025  6:22 PM    PCP: Tiffani Cassidy    ED PROVIDER: Christo Clemente DO      Chief Complaint   Patient presents with    Abdominal Pain         FINAL IMPRESSION:  1. Enteritis          ED COURSE & MEDICAL DECISION MAKING:    The patient presented to the emergency department today with complaints of abdominal discomfort.  She also had recent back surgery.  She has a history of intra-abdominal blood clots and is on Eliquis.  Laboratory testing today is unremarkable.  Her CT shows signs of possible enteritis versus early small bowel obstruction.  She does not have symptoms of a small bowel obstruction at this point.  She was informed of the CT results and I feel that she can be safely discharged home with instructions for close outpatient follow-up.  She states that she already has pain and nausea medications at home.  She was encouraged to return for any worsening symptoms or other concerns.    Medical Decision Making  Obtained supplemental history:Supplemental history obtained?: N/A  Reviewed external records: External records reviewed?: No  Care impacted by chronic illness:N/A  Did you consider but not order tests?: Work up considered but not performed and documented in chart, if applicable  Did you interpret images independently?: Independent interpretation of ECG and images noted in documentation, when applicable.  Consultation discussion with other provider:Did you involve another provider (consultant, , pharmacy, etc.)?: No  Discharge. No recommendations on prescription strength medication(s). See documentation for any additional details.    MIPS (CTPE, Dental pain, [place], Sinusitis, Asthma/COPD, Head Trauma): Not Applicable    SEPSIS: None        At the conclusion of the encounter I discussed the results of all of the tests and the disposition. The questions  were answered. The patient or family acknowledged understanding and was agreeable with the care plan.         MEDICATIONS GIVEN IN THE EMERGENCY:  Medications   morphine (PF) injection 4 mg (4 mg Intravenous $Given 3/24/25 1922)   iopamidol (ISOVUE-370) solution 90 mL (90 mLs Intravenous $Given 3/24/25 1947)   dicyclomine (BENTYL) injection 10 mg (10 mg Intramuscular $Given 3/24/25 2014)       =================================================================    HPI        Anisa Love is a 45 year old female with a pertinent history of recent C-spine surgery who presents to the emergency department today complaining of abdominal pain.  She had disc surgery at [facility] at the end of last week.  Last night she developed some diffuse abdominal pain.  She states that she has had intra-abdominal blood clots in the past which felt similar to this.  She is on Eliquis but held this medicine for approximately 1 week leading up to her neck surgery.  She denies any vomiting but has had some nausea which she states is chronic.  No recent fevers.  No other current complaints.      PAST MEDICAL HISTORY:  Past Medical History:   Diagnosis Date    Depression     GERD (gastroesophageal reflux disease)     Gonorrhea     HPV (human papilloma virus) anogenital infection     Hypertension     Mental disorder     Motion sickness     Noninfectious ileitis     Other chronic pain 2023    PONV (postoperative nausea and vomiting)     Sleep apnea     Slight does not use a CPAP    TMJ (temporomandibular joint syndrome)     Pt is able to put 3 fingers stacked in mouth    Ulcerative colitis (H)     Venous thromboembolism 2024       PAST SURGICAL HISTORY:  Past Surgical History:   Procedure Laterality Date     SECTION N/A 2019    Procedure:  SECTION;  Surgeon: Magi Ayala MD;  Location: [facility] OR;  Service: Obstetrics    HYSTEROSCOPY, WITH ENDOMETRIAL RADIOFREQUENCY ABLATION - YAZAN  2023     Procedure: HYSTEROSCOPY with DILATION AND CURETTAGE, DIAGNOSTIC LAPAROSCOPY, BILATERAL LAPAROSCOPIC SALPINGECTOMY;  Surgeon: Anitha Archuleta DO;  Location: [facility] OR    HYSTEROSCOPY, WITH ENDOMETRIAL RADIOFREQUENCY ABLATION - NOVASURE N/A 3/8/2024    Procedure: HYSTEROSCOPY, WITH ENDOMETRIAL ABLATION;  Surgeon: Anitha Archuleta DO;  Location: [facility] OR    LAPAROSCOPIC CHOLECYSTECTOMY  11/01/2019    LAPAROSCOPY DIAGNOSTIC (GYN) N/A 8/3/2022    Procedure: DIAGNOSTIC LAPAROSCOPY, excision of endometrial cysts and cauterizaton of endometrial cysts;  Surgeon: Minerva York MD;  Location: [facility] OR    WISDOM TOOTH EXTRACTION             CURRENT MEDICATIONS:    acetaminophen (TYLENOL) 325 MG tablet  acyclovir (ZOVIRAX) 400 MG tablet  ALPRAZolam (XANAX) 0.5 MG tablet  amLODIPine (NORVASC) 10 MG tablet  colestipol (COLESTID) 5 g packet  eszopiclone (LUNESTA) 3 MG tablet  HYDROmorphone (DILAUDID) 2 MG tablet  losartan (COZAAR) 50 MG tablet  ondansetron (ZOFRAN ODT) 4 MG ODT tab  prochlorperazine (COMPAZINE) 5 MG tablet  senna-docusate (SENOKOT-S/PERICOLACE) 8.6-50 MG tablet  venlafaxine (EFFEXOR XR) 150 MG 24 hr capsule        ALLERGIES:  No Known Allergies    FAMILY HISTORY:  No family history on file.    SOCIAL HISTORY:   Social History     Socioeconomic History    Marital status: Single   Tobacco Use    Smoking status: Former     Current packs/day: 0.20     Types: Cigarettes    Smokeless tobacco: Never    Tobacco comments:     Quit in september   Substance and Sexual Activity    Alcohol use: Yes     Comment: Occasional    Drug use: Yes     Types: Marijuana     Social Drivers of Health     Financial Resource Strain: Low Risk  (11/3/2024)    Financial Resource Strain     Within the past 12 months, have you or your family members you live with been unable to get utilities (heat, electricity) when it was really needed?: No   Food Insecurity: Low Risk  (11/3/2024)    Food Insecurity      Within the past 12 months, did you worry that your food would run out before you got money to buy more?: No     Within the past 12 months, did the food you bought just not last and you didn t have money to get more?: No   Transportation Needs: Low Risk  (11/3/2024)    Transportation Needs     Within the past 12 months, has lack of transportation kept you from medical appointments, getting your medicines, non-medical meetings or appointments, work, or from getting things that you need?: No   Physical Activity: Insufficiently Active (5/1/2024)    Received from [facility]    Exercise Vital Sign     Days of Exercise per Week: 1 day     Minutes of Exercise per Session: 30 min   Interpersonal Safety: Low Risk  (11/3/2024)    Interpersonal Safety     Do you feel physically and emotionally safe where you currently live?: Yes     Within the past 12 months, have you been hit, slapped, kicked or otherwise physically hurt by someone?: No     Within the past 12 months, have you been humiliated or emotionally abused in other ways by your partner or ex-partner?: No   Housing Stability: Low Risk  (11/3/2024)    Housing Stability     Do you have housing? : Yes     Are you worried about losing your housing?: No       VITALS:  Patient Vitals for the past 24 hrs:   BP Temp Temp src Pulse Resp SpO2 Weight   03/24/25 2013 -- 98.7  F (37.1  C) Oral -- -- -- --   03/24/25 2005 -- -- -- -- 18 -- --   03/24/25 1930 128/73 -- -- 88 (!) 43 94 % --   03/24/25 1915 125/77 -- -- 74 18 96 % --   03/24/25 1858 -- -- -- 73 18 96 % --   03/24/25 1855 -- -- -- 74 18 96 % --   03/24/25 1845 125/76 -- -- 75 24 99 % --   03/24/25 1843 -- -- -- 78 19 98 % --   03/24/25 1835 130/76 -- -- 76 20 96 % --   03/24/25 1825 (!) 146/77 -- -- 80 12 98 % --   03/24/25 1819 (!) 157/85 -- -- 84 16 98 % 84.8 kg (187 lb)       PHYSICAL EXAM    Constitutional:  Well developed, Well nourished,  HENT:  Normocephalic, Atraumatic, Oropharynx moist, Nose normal.   Eyes:   EOMI, Conjunctiva normal, No discharge.   Respiratory:  Normal breath sounds, No respiratory distress, No wheezing  Cardiovascular:  Normal heart rate, Normal rhythm, No murmurs  GI:  Soft, mild diffuse abdominal tenderness, No guarding  Musculoskeletal:  No tenderness to palpation or major deformities noted.   Neurologic:  Alert & oriented x 3, No focal deficits noted.   Psychiatric:  Affect normal, Judgment normal, Mood normal.        LAB:  All pertinent labs reviewed and interpreted.  Results for orders placed or performed during the hospital encounter of 03/24/25              CBC with platelets   Result Value Ref Range    WBC Count 6.6 4.0 - 11.0 10e3/uL    RBC Count 4.48 3.80 - 5.20 10e6/uL    Hemoglobin 13.7 11.7 - 15.7 g/dL    Hematocrit 37.8 35.0 - 47.0 %    MCV 84 78 - 100 fL    MCH 30.6 26.5 - 33.0 pg    MCHC 36.2 31.5 - 36.5 g/dL    RDW 12.5 10.0 - 15.0 %    Platelet Count 295 150 - 450 10e3/uL   Basic metabolic panel   Result Value Ref Range    Sodium 139 135 - 145 mmol/L    Potassium 3.7 3.4 - 5.3 mmol/L    Chloride 101 98 - 107 mmol/L    Carbon Dioxide (CO2) 28 22 - 29 mmol/L    Anion Gap 10 7 - 15 mmol/L    Urea Nitrogen 12.0 6.0 - 20.0 mg/dL    Creatinine 0.74 0.51 - 0.95 mg/dL    GFR Estimate >90 >60 mL/min/1.73m2    Calcium 9.6 8.8 - 10.4 mg/dL    Glucose 120 (H) 70 - 99 mg/dL   Hepatic function panel   Result Value Ref Range    Protein Total 6.5 6.4 - 8.3 g/dL    Albumin 4.1 3.5 - 5.2 g/dL    Bilirubin Total 0.5 <=1.2 mg/dL    Alkaline Phosphatase 51 40 - 150 U/L    AST 46 (H) 0 - 45 U/L     (H) 0 - 50 U/L    Bilirubin Direct 0.21 0.00 - 0.30 mg/dL   Result Value Ref Range    Lipase 23 13 - 60 U/L   UA with Microscopic reflex to Culture    Specimen: Urine, Clean Catch   Result Value Ref Range    Color Urine Colorless Colorless, Straw, Light Yellow, Yellow    Appearance Urine Clear Clear    Glucose Urine Negative Negative mg/dL    Bilirubin Urine Negative Negative    Ketones Urine Negative  Negative mg/dL    Specific Gravity Urine 1.004 1.001 - 1.030    Blood Urine Negative Negative    pH Urine 5.5 5.0 - 7.0    Protein Albumin Urine Negative Negative mg/dL    Urobilinogen Urine Normal Normal mg/dL    Nitrite Urine Negative Negative    Leukocyte Esterase Urine Negative Negative    Bacteria Urine Few (A) None Seen /HPF    RBC Urine 0 <=2 /HPF    WBC Urine <1 <=5 /HPF    Squamous Epithelials Urine 1 <=1 /HPF   hCG Qualitative Pregnancy   Result Value Ref Range    hCG Serum Qualitative Negative Negative       RADIOLOGY:  I have independently reviewed and interpreted the above imaging, pending the final radiology read.  CT Abdomen Pelvis w Contrast   Final Result   IMPRESSION:    1.  A few prominent fluid-filled loops of small bowel in the lower abdomen with relative transition near the ileocolonic anastomosis, which may represent adaptive changes, developing/low-grade obstruction, or potentially enteritis. Correlate with    symptoms of obstruction.   2.  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 No new thrombus in the portal venous system.   3.  Trace pleural effusions.               Christo Clemente DO  Emergency Medicine  [facility] EMERGENCY DEPARTMENT  [facility] [facility] [id number] [phone]  Dept: [phone]     Christo Clemente DO  03/24/25 2650

## 2025-03-24 NOTE — ED TRIAGE NOTES
The pt presents for evaluation of abdominal pain. She had C spine surgery this past Thursday. She reports concerning abdominal pain that is akin to pain she experienced in the past that was related to post-op blood clots.

## 2025-03-25 NOTE — DISCHARGE INSTRUCTIONS
Fortunately your testing today does not show any serious abnormalities.  Your symptoms may be related to enteritis.  Take your previously prescribed pain and nausea medication as directed and follow-up with your primary care doctor.  Return to the ER for any worsening symptoms or other concerns.

## 2025-07-24 ENCOUNTER — LAB REQUISITION (OUTPATIENT)
Dept: LAB | Facility: CLINIC | Age: 45
End: 2025-07-24

## 2025-07-24 PROCEDURE — 87081 CULTURE SCREEN ONLY: CPT

## 2025-07-26 LAB — BACTERIA SPEC CULT: NORMAL
